# Patient Record
Sex: FEMALE | Race: WHITE | Employment: OTHER | URBAN - NONMETROPOLITAN AREA
[De-identification: names, ages, dates, MRNs, and addresses within clinical notes are randomized per-mention and may not be internally consistent; named-entity substitution may affect disease eponyms.]

---

## 2017-01-09 ENCOUNTER — TELEPHONE (OUTPATIENT)
Dept: SURGERY | Age: 72
End: 2017-01-09

## 2017-01-10 ENCOUNTER — OFFICE VISIT (OUTPATIENT)
Dept: SURGERY | Age: 72
End: 2017-01-10
Payer: MEDICARE

## 2017-01-10 ENCOUNTER — HOSPITAL ENCOUNTER (OUTPATIENT)
Dept: WOMENS IMAGING | Age: 72
Discharge: HOME | End: 2017-01-10
Payer: MEDICARE

## 2017-01-10 VITALS
HEART RATE: 70 BPM | DIASTOLIC BLOOD PRESSURE: 70 MMHG | RESPIRATION RATE: 18 BRPM | HEIGHT: 65 IN | WEIGHT: 190.4 LBS | TEMPERATURE: 98.2 F | BODY MASS INDEX: 31.72 KG/M2 | SYSTOLIC BLOOD PRESSURE: 118 MMHG

## 2017-01-10 DIAGNOSIS — Z12.31 ENCOUNTER FOR SCREENING MAMMOGRAM FOR HIGH-RISK PATIENT: ICD-10-CM

## 2017-01-10 DIAGNOSIS — Z12.31 VISIT FOR SCREENING MAMMOGRAM: Primary | ICD-10-CM

## 2017-01-10 PROCEDURE — 99212 OFFICE O/P EST SF 10 MIN: CPT | Performed by: PHYSICIAN ASSISTANT

## 2017-01-10 PROCEDURE — G0202 SCR MAMMO BI INCL CAD: HCPCS

## 2017-01-10 RX ORDER — TAMOXIFEN CITRATE 10 MG/1
TABLET ORAL
COMMUNITY
Start: 2016-12-28 | End: 2019-06-12 | Stop reason: CLARIF

## 2017-06-29 ENCOUNTER — TELEPHONE (OUTPATIENT)
Dept: SURGERY | Age: 72
End: 2017-06-29

## 2017-09-25 ENCOUNTER — TELEPHONE (OUTPATIENT)
Dept: INTERNAL MEDICINE | Age: 72
End: 2017-09-25

## 2017-10-03 DIAGNOSIS — Z00.00 ROUTINE GENERAL MEDICAL EXAMINATION AT A HEALTH CARE FACILITY: ICD-10-CM

## 2017-10-03 DIAGNOSIS — R73.01 IMPAIRED FASTING GLUCOSE: ICD-10-CM

## 2017-10-03 DIAGNOSIS — E03.9 UNSPECIFIED HYPOTHYROIDISM: ICD-10-CM

## 2017-10-03 DIAGNOSIS — E78.00 PURE HYPERCHOLESTEROLEMIA: Primary | ICD-10-CM

## 2017-10-03 DIAGNOSIS — E55.9 UNSPECIFIED VITAMIN D DEFICIENCY: ICD-10-CM

## 2017-11-16 PROBLEM — I34.0 NON-RHEUMATIC MITRAL REGURGITATION: Status: ACTIVE | Noted: 2017-11-16

## 2017-11-16 PROBLEM — R73.01 IFG (IMPAIRED FASTING GLUCOSE): Status: ACTIVE | Noted: 2017-11-16

## 2017-11-16 PROBLEM — E03.9 ACQUIRED HYPOTHYROIDISM: Status: ACTIVE | Noted: 2017-11-16

## 2017-11-16 PROBLEM — E53.8 B12 DEFICIENCY: Status: ACTIVE | Noted: 2017-11-16

## 2017-11-16 PROBLEM — E66.09 EXOGENOUS OBESITY: Status: ACTIVE | Noted: 2017-11-16

## 2017-11-16 PROBLEM — E55.9 VITAMIN D DEFICIENCY: Status: ACTIVE | Noted: 2017-11-16

## 2017-11-16 PROBLEM — E78.00 PURE HYPERCHOLESTEROLEMIA: Status: ACTIVE | Noted: 2017-11-16

## 2017-12-08 ENCOUNTER — OFFICE VISIT (OUTPATIENT)
Dept: INTERNAL MEDICINE | Age: 72
End: 2017-12-08
Payer: MEDICARE

## 2017-12-08 VITALS
SYSTOLIC BLOOD PRESSURE: 138 MMHG | WEIGHT: 200 LBS | DIASTOLIC BLOOD PRESSURE: 83 MMHG | RESPIRATION RATE: 18 BRPM | HEIGHT: 65 IN | BODY MASS INDEX: 33.32 KG/M2

## 2017-12-08 DIAGNOSIS — E03.9 ACQUIRED HYPOTHYROIDISM: ICD-10-CM

## 2017-12-08 DIAGNOSIS — E78.00 PURE HYPERCHOLESTEROLEMIA: ICD-10-CM

## 2017-12-08 DIAGNOSIS — Z23 IMMUNIZATION DUE: ICD-10-CM

## 2017-12-08 DIAGNOSIS — Z00.00 ROUTINE GENERAL MEDICAL EXAMINATION AT A HEALTH CARE FACILITY: ICD-10-CM

## 2017-12-08 DIAGNOSIS — I48.0 PAF (PAROXYSMAL ATRIAL FIBRILLATION) (HCC): ICD-10-CM

## 2017-12-08 DIAGNOSIS — E55.9 VITAMIN D DEFICIENCY: ICD-10-CM

## 2017-12-08 DIAGNOSIS — Z00.00 MEDICARE ANNUAL WELLNESS VISIT, SUBSEQUENT: Primary | ICD-10-CM

## 2017-12-08 DIAGNOSIS — I34.0 NON-RHEUMATIC MITRAL REGURGITATION: ICD-10-CM

## 2017-12-08 DIAGNOSIS — R73.01 IMPAIRED FASTING GLUCOSE: ICD-10-CM

## 2017-12-08 DIAGNOSIS — R73.01 IFG (IMPAIRED FASTING GLUCOSE): ICD-10-CM

## 2017-12-08 DIAGNOSIS — E03.9 HYPOTHYROIDISM: ICD-10-CM

## 2017-12-08 LAB
ALBUMIN SERPL-MCNC: 4.3 G/DL (ref 3.5–5.2)
ALP BLD-CCNC: 53 U/L (ref 35–104)
ALT SERPL-CCNC: 16 U/L (ref 5–33)
ANION GAP SERPL CALCULATED.3IONS-SCNC: 13 MMOL/L (ref 7–19)
AST SERPL-CCNC: 21 U/L (ref 5–32)
BILIRUB SERPL-MCNC: 0.7 MG/DL (ref 0.2–1.2)
BILIRUBIN URINE: NEGATIVE
BLOOD, URINE: NEGATIVE
BUN BLDV-MCNC: 8 MG/DL (ref 8–23)
CALCIUM SERPL-MCNC: 9.1 MG/DL (ref 8.8–10.2)
CHLORIDE BLD-SCNC: 107 MMOL/L (ref 98–111)
CHOLESTEROL, TOTAL: 203 MG/DL (ref 160–199)
CLARITY: ABNORMAL
CO2: 25 MMOL/L (ref 22–29)
COLOR: YELLOW
CREAT SERPL-MCNC: <0.5 MG/DL (ref 0.5–0.9)
GFR NON-AFRICAN AMERICAN: >60
GLUCOSE BLD-MCNC: 94 MG/DL (ref 74–109)
GLUCOSE URINE: NEGATIVE MG/DL
HBA1C MFR BLD: 5.3 %
HCT VFR BLD CALC: 43.6 % (ref 37–47)
HDLC SERPL-MCNC: 71 MG/DL (ref 65–121)
HEMOGLOBIN: 14.2 G/DL (ref 12–16)
KETONES, URINE: NEGATIVE MG/DL
LDL CHOLESTEROL CALCULATED: 106 MG/DL
LEUKOCYTE ESTERASE, URINE: NEGATIVE
MCH RBC QN AUTO: 32.3 PG (ref 27–31)
MCHC RBC AUTO-ENTMCNC: 32.6 G/DL (ref 33–37)
MCV RBC AUTO: 99.3 FL (ref 81–99)
NITRITE, URINE: NEGATIVE
PDW BLD-RTO: 12.4 % (ref 11.5–14.5)
PH UA: 7.5
PLATELET # BLD: 257 K/UL (ref 130–400)
PMV BLD AUTO: 11.6 FL (ref 9.4–12.3)
POTASSIUM SERPL-SCNC: 4.3 MMOL/L (ref 3.5–5)
PROTEIN UA: NEGATIVE MG/DL
RBC # BLD: 4.39 M/UL (ref 4.2–5.4)
SODIUM BLD-SCNC: 145 MMOL/L (ref 136–145)
SPECIFIC GRAVITY UA: 1.01
T4 FREE: 1.5 NG/DL (ref 0.9–1.7)
TOTAL PROTEIN: 6.8 G/DL (ref 6.6–8.7)
TRIGL SERPL-MCNC: 130 MG/DL (ref 0–149)
TSH SERPL DL<=0.05 MIU/L-ACNC: 2.14 UIU/ML (ref 0.27–4.2)
UROBILINOGEN, URINE: 0.2 E.U./DL
VITAMIN D 25-HYDROXY: 36.5 NG/ML
WBC # BLD: 5.7 K/UL (ref 4.8–10.8)

## 2017-12-08 PROCEDURE — 3014F SCREEN MAMMO DOC REV: CPT | Performed by: INTERNAL MEDICINE

## 2017-12-08 PROCEDURE — G8400 PT W/DXA NO RESULTS DOC: HCPCS | Performed by: INTERNAL MEDICINE

## 2017-12-08 PROCEDURE — 1090F PRES/ABSN URINE INCON ASSESS: CPT | Performed by: INTERNAL MEDICINE

## 2017-12-08 PROCEDURE — G0439 PPPS, SUBSEQ VISIT: HCPCS | Performed by: INTERNAL MEDICINE

## 2017-12-08 PROCEDURE — 1036F TOBACCO NON-USER: CPT | Performed by: INTERNAL MEDICINE

## 2017-12-08 PROCEDURE — 99213 OFFICE O/P EST LOW 20 MIN: CPT | Performed by: INTERNAL MEDICINE

## 2017-12-08 PROCEDURE — 90670 PCV13 VACCINE IM: CPT | Performed by: INTERNAL MEDICINE

## 2017-12-08 PROCEDURE — 1123F ACP DISCUSS/DSCN MKR DOCD: CPT | Performed by: INTERNAL MEDICINE

## 2017-12-08 PROCEDURE — G8427 DOCREV CUR MEDS BY ELIG CLIN: HCPCS | Performed by: INTERNAL MEDICINE

## 2017-12-08 PROCEDURE — G0009 ADMIN PNEUMOCOCCAL VACCINE: HCPCS | Performed by: INTERNAL MEDICINE

## 2017-12-08 PROCEDURE — 4040F PNEUMOC VAC/ADMIN/RCVD: CPT | Performed by: INTERNAL MEDICINE

## 2017-12-08 PROCEDURE — G8417 CALC BMI ABV UP PARAM F/U: HCPCS | Performed by: INTERNAL MEDICINE

## 2017-12-08 PROCEDURE — 3017F COLORECTAL CA SCREEN DOC REV: CPT | Performed by: INTERNAL MEDICINE

## 2017-12-08 PROCEDURE — G8484 FLU IMMUNIZE NO ADMIN: HCPCS | Performed by: INTERNAL MEDICINE

## 2017-12-08 ASSESSMENT — ENCOUNTER SYMPTOMS
BLOOD IN STOOL: 0
EYE REDNESS: 0
COUGH: 0
VOICE CHANGE: 0
COLOR CHANGE: 0
ABDOMINAL PAIN: 0
NAUSEA: 0
SORE THROAT: 0
CHEST TIGHTNESS: 0
WHEEZING: 0
CONSTIPATION: 0
TROUBLE SWALLOWING: 0
SINUS PRESSURE: 0
DIARRHEA: 0
VOMITING: 0
EYE PAIN: 0

## 2017-12-08 ASSESSMENT — LIFESTYLE VARIABLES: HOW OFTEN DO YOU HAVE A DRINK CONTAINING ALCOHOL: 0

## 2017-12-08 ASSESSMENT — ANXIETY QUESTIONNAIRES: GAD7 TOTAL SCORE: 0

## 2017-12-08 ASSESSMENT — PATIENT HEALTH QUESTIONNAIRE - PHQ9: SUM OF ALL RESPONSES TO PHQ QUESTIONS 1-9: 0

## 2017-12-08 NOTE — PROGRESS NOTES
Medical History:   Diagnosis Date    Breast cancer ([facility] [address] 1/2014    right    GERD (gastroesophageal reflux disease)     Hyperlipidemia     Thyroid disease     Wears glasses        Past Surgical History:   Procedure Laterality Date    BREAST SURGERY Bilateral 1985    FIBROIDS REMOVED FROM EACH BREAST    BREAST SURGERY Right 1/7/14    R partial mastectomy    BREAST SURGERY Right 1/22/14    Rt JUAN R cath insertion    MELVINA AND BSO         Current Outpatient Prescriptions   Medication Sig Dispense Refill    tamoxifen (NOLVADEX) 10 MG tablet       ibuprofen (ADVIL;MOTRIN) 800 MG tablet Take 1 tablet by mouth every 8 hours as needed for Pain 90 tablet 0    levothyroxine (SYNTHROID) 75 MCG tablet       simvastatin (ZOCOR) 20 MG tablet       latanoprost (XALATAN) 0.005 % ophthalmic solution       Vitamin D (CHOLECALCIFEROL) 1000 UNITS CAPS capsule Take 1,000 Units by mouth daily.  esomeprazole Magnesium (NEXIUM) 40 MG PACK Take 40 mg by mouth daily.  aspirin 81 MG tablet Take 81 mg by mouth daily. No current facility-administered medications for this visit.       No Known Allergies    Social History     Social History    Marital status:      Spouse name: N/A    Number of children: N/A    Years of education: N/A     Social History Main Topics    Smoking status: Never Smoker    Smokeless tobacco: Never Used    Alcohol use No    Drug use: No    Sexual activity: No     Other Topics Concern    None     Social History Narrative    None     Family History   Problem Relation Age of Onset    Cancer Mother      throat    Cancer Maternal Grandmother      ovarian    Parkinsonism Father     Breast Cancer Neg Hx        Past Surgical History:   Procedure Laterality Date    BREAST SURGERY Bilateral 1985    FIBROIDS REMOVED FROM EACH BREAST    BREAST SURGERY Right 1/7/14    R partial mastectomy    BREAST SURGERY Right 1/22/14    Rt JUAN R cath insertion    MELVINA AND BSO           Lab Review Negative for abdominal pain, blood in stool, constipation, diarrhea, nausea and vomiting. Endocrine: Negative for polydipsia and polyuria. Genitourinary: Negative for dysuria, enuresis, flank pain, frequency and urgency. Musculoskeletal: Negative for arthralgias, gait problem and joint swelling. Skin: Negative for color change and rash. Neurological: Negative for dizziness, tremors, syncope, facial asymmetry, speech difficulty, weakness, numbness and headaches. Psychiatric/Behavioral: Negative for agitation, behavioral problems, confusion, sleep disturbance and suicidal ideas. The patient is not nervous/anxious. Vitals:    12/08/17 1334   BP: 138/83   Site: Right Arm   Position: Sitting   Cuff Size: Large Adult   Resp: 18   Weight: 200 lb (90.7 kg)   Height: 5' 5\" (1.651 m)      Wt Readings from Last 3 Encounters:   12/08/17 200 lb (90.7 kg)   01/10/17 190 lb 6.4 oz (86.4 kg)   09/29/16 188 lb 3.2 oz (85.4 kg)   Body mass index is 33.28 kg/m². BP Readings from Last 3 Encounters:   12/08/17 138/83   01/10/17 118/70   09/29/16 140/80       Physical Exam   Constitutional: She is oriented to person, place, and time. She appears well-developed and well-nourished. HENT:   Head: Normocephalic and atraumatic. Right Ear: External ear normal.   Left Ear: External ear normal.   Mouth/Throat: Oropharynx is clear and moist.   Eyes: Conjunctivae are normal. Pupils are equal, round, and reactive to light. No scleral icterus. Neck: Normal range of motion. Neck supple. No JVD present. No thyromegaly present. Cardiovascular: Normal rate, regular rhythm and normal heart sounds. No murmur heard. Pulmonary/Chest: Effort normal and breath sounds normal. No respiratory distress. She has no wheezes. She exhibits no tenderness. Abdominal: Soft. Bowel sounds are normal. She exhibits no mass. There is no tenderness. Musculoskeletal: Normal range of motion. She exhibits no edema or tenderness. Lymphadenopathy:     She has no cervical adenopathy. Neurological: She is alert and oriented to person, place, and time. She has normal reflexes. No cranial nerve deficit. Coordination normal.   Skin: Skin is warm and dry. No rash noted. No erythema. Psychiatric: She has a normal mood and affect. Her behavior is normal.         ASSESSMENT/PLAN    Pure hypercholesterolemia- LDL is 106, this is better compared to 2016. She'll continue lower fat diet and exercise program    Acquired hypothyroidism-her thyroid levels are in good range, patient will continue Synthroid 75 µg daily    IFG (impaired fasting glucose)= elevated A1c is normal at 5.3 strict lower carbohydrate diet and weight loss is recommended  Pt was given approximately 5 minutes of counseling about diet and exercise including education on what calories are, where calories come from, the need for portion control,following lower carbohydrate dietary regimen and healthy snacks along side an active lifestyle with supplementary exercise of approx 30 minutes a week, 5 days a week of exercise for weight loss. The patient voiced increased understanding of the topics discussed. PAF (paroxysmal atrial fibrillation) ([address]- no further episdoes    Non-rheumatic mitral regurgitation- detected / had repeat echo [facility]   NORMAL LV AND RV SIZE AND FUNCTION  NO SIGNIFICANT VALVULAR DYSFUNCTION  BORDERLINE PULMONARY HTN  BORDERLINE LAE        MEDICARE WELLNESS SCREENING/ MAINTENANCE:  1. MAMMOGRAM:As per Dr. Chandler Guerrier  2. SCREENING CSCOPE , repeat 10 years  3. BONE DENSITY SCREENIN normal, repeat 5 years  4. PAP SCREENING:na  5. DIABETES SCREEN - FBS DONE/ ABOVE LABS  6. CARDIOVASCULAR SCREENING- LIPID PANEL  DONE/ ABOVE LABS  7. PNEUMONIA VACCINATION- prevanr today  8. INFLUENZA VACCINATION: TO BE DONE IN FALL- refuses  9. HEP B VACCINATION- PT DECLINES  10. HIV/ HEP SCREENING- PT DECLINES  11.  OPTOMETRY/OPTHALMOLOGY APPOINTMENT SUGGESTED FOR GLAUCOMA SCREENING has appt to see dr Mari Mittal  12. AAA SCREEN - N/A    HEALTH PLAN:  1. HEALTHY DIET:lower insaturated fats and free sugars  2. EXERCISE suggest at least 30-50 min dialy  3. NO INCREASED FALL RISK ON EXAM    Orders Placed This Encounter   Procedures    Hemoglobin A1C    Lipid Panel    Comprehensive Metabolic Panel    TSH without Reflex    T4, Free     New Prescriptions    No medications on file        Return in about 6 months (around 6/8/2018) for Medication check. Patient Instructions     Patient Education        Learning About Low-Carbohydrate Diets for Weight Loss  What is a low-carbohydrate diet? Low-carb diets avoid foods that are high in carbohydrate. These high-carb foods include pasta, bread, rice, cereal, fruits, and starchy vegetables. Instead, these diets usually have you eat foods that are high in fat and protein. Many people lose weight quickly on a low-carb diet. But the early weight loss is water. People on this diet often gain the weight back after they start eating carbs again. Not all diet plans are safe or work well. A lot of the evidence shows that low-carb diets aren't healthy. That's because these diets often don't include healthy foods like fruits and vegetables. Losing weight safely means balancing protein, fat, and carbs with every meal and snack. And low-carb diets don't always provide the vitamins, minerals, and fiber you need. If you have a serious medical condition, talk to your doctor before you try any diet. These conditions include kidney disease, heart disease, type 2 diabetes, high cholesterol, and high blood pressure. If you are pregnant, it may not be safe for your baby if you are on a low-carb diet. How can you lose weight safely? You might have heard that a diet plan helped another person lose weight. But that doesn't mean that it will work for you. It is very hard to stay on a diet that includes lots of big changes in your eating habits.  If you want to get to a healthy weight and stay there, making healthy lifestyle changes will often work better than dieting. These steps can help. · Make a plan for change. Work with your doctor to create a plan that is right for you. · See a dietitian. He or she can show you how to make healthy changes in your eating habits. · Manage stress. If you have a lot of stress in your life, it can be hard to focus on making healthy changes to your daily habits. · Track your food and activity. You are likely to do better at losing weight if you keep track of what you eat and what you do. Follow-up care is a key part of your treatment and safety. Be sure to make and go to all appointments, and call your doctor if you are having problems. It's also a good idea to know your test results and keep a list of the medicines you take. Where can you learn more? Go to https://FlexcompeInvoiceable.I.Systems. org and sign in to your [employer] account. Enter A121 in the Projectioneering box to learn more about \"Learning About Low-Carbohydrate Diets for Weight Loss. \"     If you do not have an account, please click on the \"Sign Up Now\" link. Current as of: May 12, 2017  Content Version: 11.4  © 1096-8438 Healthwise, [employer]. Care instructions adapted under license by [facility] ([facility]). If you have questions about a medical condition or this instruction, always ask your healthcare professional. [place] [address] any warranty or liability for your use of this information.            Orders Placed This Encounter   Procedures    Hemoglobin A1C     Standing Status:   Future     Standing Expiration Date:   12/8/2018    Lipid Panel     Standing Status:   Future     Standing Expiration Date:   12/8/2018     Order Specific Question:   Is Patient Fasting?/# of Hours     Answer:   fasting    Comprehensive Metabolic Panel     Standing Status:   Future     Standing Expiration Date:   12/8/2018    TSH without Reflex     Standing Status: Future     Standing Expiration Date:   12/8/2018    T4, Free     Standing Status:   Future     Standing Expiration Date:   12/8/2018     EMR Dragon/transcription disclaimer:Significant part of this  encounter note is electronic transcription/translation of spoken language to printed text. The electronic translation of spoken language may be erroneous, or at times, nonsensical words or phrases may be inadvertently transcribed.  Although I have reviewed the note for such errors, some may still exist.

## 2017-12-08 NOTE — PATIENT INSTRUCTIONS
losing weight if you keep track of what you eat and what you do. Follow-up care is a key part of your treatment and safety. Be sure to make and go to all appointments, and call your doctor if you are having problems. It's also a good idea to know your test results and keep a list of the medicines you take. Where can you learn more? Go to https://chpepiceweb.TV Talk Network. org and sign in to your [employer] account. Enter A121 in the Navendis box to learn more about \"Learning About Low-Carbohydrate Diets for Weight Loss. \"     If you do not have an account, please click on the \"Sign Up Now\" link. Current as of: May 12, 2017  Content Version: 11.4  © 0318-5283 Healthwise, Incorporated. Care instructions adapted under license by [facility] ([facility]). If you have questions about a medical condition or this instruction, always ask your healthcare professional. [address] any warranty or liability for your use of this information.

## 2018-01-04 ENCOUNTER — HOSPITAL ENCOUNTER (OUTPATIENT)
Dept: WOMENS IMAGING | Age: 73
Discharge: HOME | End: 2018-01-04
Payer: MEDICARE

## 2018-01-04 DIAGNOSIS — Z78.0 ASYMPTOMATIC MENOPAUSAL STATE: ICD-10-CM

## 2018-01-04 PROCEDURE — 77080 DXA BONE DENSITY AXIAL: CPT

## 2018-01-11 ENCOUNTER — HOSPITAL ENCOUNTER (OUTPATIENT)
Dept: WOMENS IMAGING | Age: 73
Discharge: HOME | End: 2018-01-11
Payer: MEDICARE

## 2018-01-11 DIAGNOSIS — Z12.31 VISIT FOR SCREENING MAMMOGRAM: ICD-10-CM

## 2018-01-11 PROCEDURE — 77063 BREAST TOMOSYNTHESIS BI: CPT

## 2018-04-12 PROBLEM — Z00.00 MEDICARE ANNUAL WELLNESS VISIT, SUBSEQUENT: Status: RESOLVED | Noted: 2017-12-08 | Resolved: 2018-04-12

## 2018-05-07 RX ORDER — LEVOTHYROXINE SODIUM 0.07 MG/1
TABLET ORAL
Qty: 90 TABLET | Refills: 3 | Status: SHIPPED | OUTPATIENT
Start: 2018-05-07 | End: 2018-06-19 | Stop reason: SDUPTHER

## 2018-06-19 ENCOUNTER — OFFICE VISIT (OUTPATIENT)
Dept: INTERNAL MEDICINE | Age: 73
End: 2018-06-19
Payer: MEDICARE

## 2018-06-19 VITALS
DIASTOLIC BLOOD PRESSURE: 88 MMHG | OXYGEN SATURATION: 95 % | SYSTOLIC BLOOD PRESSURE: 132 MMHG | WEIGHT: 193.2 LBS | BODY MASS INDEX: 32.19 KG/M2 | HEIGHT: 65 IN | HEART RATE: 76 BPM

## 2018-06-19 DIAGNOSIS — E55.9 VITAMIN D DEFICIENCY: ICD-10-CM

## 2018-06-19 DIAGNOSIS — R73.01 IFG (IMPAIRED FASTING GLUCOSE): ICD-10-CM

## 2018-06-19 DIAGNOSIS — E03.9 ACQUIRED HYPOTHYROIDISM: ICD-10-CM

## 2018-06-19 DIAGNOSIS — E78.00 PURE HYPERCHOLESTEROLEMIA: ICD-10-CM

## 2018-06-19 DIAGNOSIS — E78.00 PURE HYPERCHOLESTEROLEMIA: Primary | ICD-10-CM

## 2018-06-19 DIAGNOSIS — E53.8 B12 DEFICIENCY: ICD-10-CM

## 2018-06-19 DIAGNOSIS — E66.09 EXOGENOUS OBESITY: ICD-10-CM

## 2018-06-19 LAB
ALBUMIN SERPL-MCNC: 4 G/DL (ref 3.5–5.2)
ALP BLD-CCNC: 50 U/L (ref 35–104)
ALT SERPL-CCNC: 18 U/L (ref 5–33)
ANION GAP SERPL CALCULATED.3IONS-SCNC: 13 MMOL/L (ref 7–19)
AST SERPL-CCNC: 37 U/L (ref 5–32)
BILIRUB SERPL-MCNC: 0.6 MG/DL (ref 0.2–1.2)
BUN BLDV-MCNC: 14 MG/DL (ref 8–23)
CALCIUM SERPL-MCNC: 8.9 MG/DL (ref 8.8–10.2)
CHLORIDE BLD-SCNC: 104 MMOL/L (ref 98–111)
CHOLESTEROL, TOTAL: 245 MG/DL (ref 160–199)
CO2: 25 MMOL/L (ref 22–29)
CREAT SERPL-MCNC: 0.6 MG/DL (ref 0.5–0.9)
GFR NON-AFRICAN AMERICAN: >60
GLUCOSE BLD-MCNC: 100 MG/DL (ref 74–109)
HBA1C MFR BLD: 5.1 %
HDLC SERPL-MCNC: 63 MG/DL (ref 65–121)
LDL CHOLESTEROL CALCULATED: 146 MG/DL
POTASSIUM SERPL-SCNC: 4.3 MMOL/L (ref 3.5–5)
SODIUM BLD-SCNC: 142 MMOL/L (ref 136–145)
T4 FREE: 1.4 NG/DL (ref 0.9–1.7)
TOTAL PROTEIN: 6.6 G/DL (ref 6.6–8.7)
TRIGL SERPL-MCNC: 180 MG/DL (ref 0–149)
TSH SERPL DL<=0.05 MIU/L-ACNC: 2.81 UIU/ML (ref 0.27–4.2)

## 2018-06-19 PROCEDURE — 99214 OFFICE O/P EST MOD 30 MIN: CPT | Performed by: INTERNAL MEDICINE

## 2018-06-19 PROCEDURE — 4040F PNEUMOC VAC/ADMIN/RCVD: CPT | Performed by: INTERNAL MEDICINE

## 2018-06-19 PROCEDURE — 1036F TOBACCO NON-USER: CPT | Performed by: INTERNAL MEDICINE

## 2018-06-19 PROCEDURE — G8417 CALC BMI ABV UP PARAM F/U: HCPCS | Performed by: INTERNAL MEDICINE

## 2018-06-19 PROCEDURE — 3017F COLORECTAL CA SCREEN DOC REV: CPT | Performed by: INTERNAL MEDICINE

## 2018-06-19 PROCEDURE — G8427 DOCREV CUR MEDS BY ELIG CLIN: HCPCS | Performed by: INTERNAL MEDICINE

## 2018-06-19 PROCEDURE — G8399 PT W/DXA RESULTS DOCUMENT: HCPCS | Performed by: INTERNAL MEDICINE

## 2018-06-19 PROCEDURE — 1090F PRES/ABSN URINE INCON ASSESS: CPT | Performed by: INTERNAL MEDICINE

## 2018-06-19 PROCEDURE — 1123F ACP DISCUSS/DSCN MKR DOCD: CPT | Performed by: INTERNAL MEDICINE

## 2018-06-19 RX ORDER — LEVOTHYROXINE SODIUM 0.07 MG/1
TABLET ORAL
Qty: 90 TABLET | Refills: 1 | Status: SHIPPED | OUTPATIENT
Start: 2018-06-19 | End: 2018-12-12 | Stop reason: CLARIF

## 2018-06-19 RX ORDER — TAMOXIFEN CITRATE 10 MG/1
TABLET ORAL
Status: CANCELLED | OUTPATIENT
Start: 2018-06-19

## 2018-06-19 ASSESSMENT — ENCOUNTER SYMPTOMS
COUGH: 0
CHEST TIGHTNESS: 0
CONSTIPATION: 0
SORE THROAT: 0
ABDOMINAL PAIN: 0
WHEEZING: 0

## 2018-12-12 ENCOUNTER — OFFICE VISIT (OUTPATIENT)
Dept: INTERNAL MEDICINE | Age: 73
End: 2018-12-12
Payer: MEDICARE

## 2018-12-12 VITALS
SYSTOLIC BLOOD PRESSURE: 120 MMHG | HEIGHT: 65 IN | WEIGHT: 195 LBS | HEART RATE: 79 BPM | OXYGEN SATURATION: 96 % | RESPIRATION RATE: 18 BRPM | BODY MASS INDEX: 32.49 KG/M2 | DIASTOLIC BLOOD PRESSURE: 78 MMHG

## 2018-12-12 DIAGNOSIS — E55.9 VITAMIN D DEFICIENCY: ICD-10-CM

## 2018-12-12 DIAGNOSIS — E53.8 B12 DEFICIENCY: ICD-10-CM

## 2018-12-12 DIAGNOSIS — R73.01 IFG (IMPAIRED FASTING GLUCOSE): ICD-10-CM

## 2018-12-12 DIAGNOSIS — E03.9 ACQUIRED HYPOTHYROIDISM: ICD-10-CM

## 2018-12-12 DIAGNOSIS — E66.09 EXOGENOUS OBESITY: ICD-10-CM

## 2018-12-12 DIAGNOSIS — Z00.00 MEDICARE ANNUAL WELLNESS VISIT, SUBSEQUENT: Primary | ICD-10-CM

## 2018-12-12 DIAGNOSIS — E78.00 PURE HYPERCHOLESTEROLEMIA: ICD-10-CM

## 2018-12-12 DIAGNOSIS — Z23 NEED FOR VACCINATION: ICD-10-CM

## 2018-12-12 LAB
ALBUMIN SERPL-MCNC: 3.9 G/DL (ref 3.5–5.2)
ALP BLD-CCNC: 53 U/L (ref 35–104)
ALT SERPL-CCNC: 16 U/L (ref 5–33)
ANION GAP SERPL CALCULATED.3IONS-SCNC: 13 MMOL/L (ref 7–19)
AST SERPL-CCNC: 20 U/L (ref 5–32)
BASOPHILS ABSOLUTE: 0.1 K/UL (ref 0–0.2)
BASOPHILS RELATIVE PERCENT: 0.8 % (ref 0–1)
BILIRUB SERPL-MCNC: 0.6 MG/DL (ref 0.2–1.2)
BILIRUBIN URINE: NEGATIVE
BLOOD, URINE: NEGATIVE
BUN BLDV-MCNC: 9 MG/DL (ref 8–23)
CALCIUM SERPL-MCNC: 9.3 MG/DL (ref 8.8–10.2)
CHLORIDE BLD-SCNC: 104 MMOL/L (ref 98–111)
CHOLESTEROL, TOTAL: 236 MG/DL (ref 160–199)
CLARITY: CLEAR
CO2: 25 MMOL/L (ref 22–29)
COLOR: YELLOW
CREAT SERPL-MCNC: 0.6 MG/DL (ref 0.5–0.9)
EOSINOPHILS ABSOLUTE: 0.2 K/UL (ref 0–0.6)
EOSINOPHILS RELATIVE PERCENT: 2.8 % (ref 0–5)
GFR NON-AFRICAN AMERICAN: >60
GLUCOSE BLD-MCNC: 88 MG/DL (ref 74–109)
GLUCOSE URINE: NEGATIVE MG/DL
HBA1C MFR BLD: 5.3 % (ref 4–6)
HCT VFR BLD CALC: 42.3 % (ref 37–47)
HDLC SERPL-MCNC: 67 MG/DL (ref 65–121)
HEMOGLOBIN: 13.5 G/DL (ref 12–16)
KETONES, URINE: NEGATIVE MG/DL
LDL CHOLESTEROL CALCULATED: 137 MG/DL
LEUKOCYTE ESTERASE, URINE: NEGATIVE
LYMPHOCYTES ABSOLUTE: 2 K/UL (ref 1.1–4.5)
LYMPHOCYTES RELATIVE PERCENT: 31.6 % (ref 20–40)
MCH RBC QN AUTO: 31.6 PG (ref 27–31)
MCHC RBC AUTO-ENTMCNC: 31.9 G/DL (ref 33–37)
MCV RBC AUTO: 99.1 FL (ref 81–99)
MONOCYTES ABSOLUTE: 0.5 K/UL (ref 0–0.9)
MONOCYTES RELATIVE PERCENT: 8.5 % (ref 0–10)
NEUTROPHILS ABSOLUTE: 3.5 K/UL (ref 1.5–7.5)
NEUTROPHILS RELATIVE PERCENT: 56.1 % (ref 50–65)
NITRITE, URINE: NEGATIVE
PDW BLD-RTO: 12.5 % (ref 11.5–14.5)
PH UA: 7.5
PLATELET # BLD: 260 K/UL (ref 130–400)
PMV BLD AUTO: 11.7 FL (ref 9.4–12.3)
POTASSIUM SERPL-SCNC: 4.2 MMOL/L (ref 3.5–5)
PROTEIN UA: NEGATIVE MG/DL
RBC # BLD: 4.27 M/UL (ref 4.2–5.4)
SODIUM BLD-SCNC: 142 MMOL/L (ref 136–145)
SPECIFIC GRAVITY UA: 1.01
T4 FREE: 1.4 NG/DL (ref 0.9–1.7)
TOTAL PROTEIN: 6.7 G/DL (ref 6.6–8.7)
TRIGL SERPL-MCNC: 161 MG/DL (ref 0–149)
TSH SERPL DL<=0.05 MIU/L-ACNC: 5.06 UIU/ML (ref 0.27–4.2)
UROBILINOGEN, URINE: 0.2 E.U./DL
VITAMIN B-12: 366 PG/ML (ref 211–946)
VITAMIN D 25-HYDROXY: 32.4 NG/ML
WBC # BLD: 6.3 K/UL (ref 4.8–10.8)

## 2018-12-12 PROCEDURE — G8399 PT W/DXA RESULTS DOCUMENT: HCPCS | Performed by: INTERNAL MEDICINE

## 2018-12-12 PROCEDURE — G8427 DOCREV CUR MEDS BY ELIG CLIN: HCPCS | Performed by: INTERNAL MEDICINE

## 2018-12-12 PROCEDURE — 3017F COLORECTAL CA SCREEN DOC REV: CPT | Performed by: INTERNAL MEDICINE

## 2018-12-12 PROCEDURE — G8417 CALC BMI ABV UP PARAM F/U: HCPCS | Performed by: INTERNAL MEDICINE

## 2018-12-12 PROCEDURE — 4040F PNEUMOC VAC/ADMIN/RCVD: CPT | Performed by: INTERNAL MEDICINE

## 2018-12-12 PROCEDURE — G8484 FLU IMMUNIZE NO ADMIN: HCPCS | Performed by: INTERNAL MEDICINE

## 2018-12-12 PROCEDURE — 1101F PT FALLS ASSESS-DOCD LE1/YR: CPT | Performed by: INTERNAL MEDICINE

## 2018-12-12 PROCEDURE — 1036F TOBACCO NON-USER: CPT | Performed by: INTERNAL MEDICINE

## 2018-12-12 PROCEDURE — 99214 OFFICE O/P EST MOD 30 MIN: CPT | Performed by: INTERNAL MEDICINE

## 2018-12-12 PROCEDURE — 1123F ACP DISCUSS/DSCN MKR DOCD: CPT | Performed by: INTERNAL MEDICINE

## 2018-12-12 PROCEDURE — G0009 ADMIN PNEUMOCOCCAL VACCINE: HCPCS | Performed by: INTERNAL MEDICINE

## 2018-12-12 PROCEDURE — 90732 PPSV23 VACC 2 YRS+ SUBQ/IM: CPT | Performed by: INTERNAL MEDICINE

## 2018-12-12 PROCEDURE — 1090F PRES/ABSN URINE INCON ASSESS: CPT | Performed by: INTERNAL MEDICINE

## 2018-12-12 PROCEDURE — G0439 PPPS, SUBSEQ VISIT: HCPCS | Performed by: INTERNAL MEDICINE

## 2018-12-12 RX ORDER — LEVOTHYROXINE SODIUM 0.07 MG/1
TABLET ORAL
Qty: 90 TABLET | Refills: 1 | Status: SHIPPED | OUTPATIENT
Start: 2018-12-12 | End: 2020-01-02 | Stop reason: SDUPTHER

## 2018-12-12 ASSESSMENT — ENCOUNTER SYMPTOMS
VOMITING: 0
COLOR CHANGE: 0
CHEST TIGHTNESS: 0
VOICE CHANGE: 0
TROUBLE SWALLOWING: 0
WHEEZING: 0
COUGH: 0
EYE REDNESS: 0
EYE PAIN: 0
SINUS PRESSURE: 0
ABDOMINAL PAIN: 0
NAUSEA: 0
CONSTIPATION: 0
BLOOD IN STOOL: 0
DIARRHEA: 0
SORE THROAT: 0

## 2018-12-12 ASSESSMENT — LIFESTYLE VARIABLES: HOW OFTEN DO YOU HAVE A DRINK CONTAINING ALCOHOL: 0

## 2018-12-12 ASSESSMENT — PATIENT HEALTH QUESTIONNAIRE - PHQ9
SUM OF ALL RESPONSES TO PHQ QUESTIONS 1-9: 0
SUM OF ALL RESPONSES TO PHQ QUESTIONS 1-9: 0

## 2018-12-12 ASSESSMENT — ANXIETY QUESTIONNAIRES: GAD7 TOTAL SCORE: 0

## 2018-12-12 NOTE — PROGRESS NOTES
12/12/2018 236*    Triglycerides 12/12/2018 161*    HDL 12/12/2018 67     LDL Calculated 12/12/2018 137     Hemoglobin A1C 12/12/2018 5.3     Sodium 12/12/2018 142     Potassium 12/12/2018 4.2     Chloride 12/12/2018 104     CO2 12/12/2018 25     Anion Gap 12/12/2018 13     Glucose 12/12/2018 88     BUN 12/12/2018 9     CREATININE 12/12/2018 0.6     GFR Non- 12/12/2018 >60     Calcium 12/12/2018 9.3     Total Protein 12/12/2018 6.7     Alb 12/12/2018 3.9     Total Bilirubin 12/12/2018 0.6     Alkaline Phosphatase 12/12/2018 53     ALT 12/12/2018 16     AST 12/12/2018 20     WBC 12/12/2018 6.3     RBC 12/12/2018 4.27     Hemoglobin 12/12/2018 13.5     Hematocrit 12/12/2018 42.3     MCV 12/12/2018 99.1*    MCH 12/12/2018 31.6*    MCHC 12/12/2018 31.9*    RDW 12/12/2018 12.5     Platelets 17/47/9847 260     MPV 12/12/2018 11.7     Neutrophils % 12/12/2018 56.1     Lymphocytes % 12/12/2018 31.6     Monocytes % 12/12/2018 8.5     Eosinophils % 12/12/2018 2.8     Basophils % 12/12/2018 0.8     Neutrophils # 12/12/2018 3.5     Lymphocytes # 12/12/2018 2.0     Monocytes # 12/12/2018 0.50     Eosinophils # 12/12/2018 0.20     Basophils # 12/12/2018 0.10     Color, UA 12/12/2018 YELLOW     Clarity, UA 12/12/2018 Clear     Glucose, Ur 12/12/2018 Negative     Bilirubin Urine 12/12/2018 Negative     Ketones, Urine 12/12/2018 Negative     Specific Gravity, UA 12/12/2018 1.011     Blood, Urine 12/12/2018 Negative     pH, UA 12/12/2018 7.5     Protein, UA 12/12/2018 Negative     Urobilinogen, Urine 12/12/2018 0.2     Nitrite, Urine 12/12/2018 Negative     Leukocyte Esterase, Urine 12/12/2018 Negative     TSH 12/12/2018 5.060*    T4 Free 12/12/2018 1.4     Vit D, 25-Hydroxy 12/12/2018 32.4     Vitamin B-12 12/12/2018 366          Review of Systems   Constitutional: Positive for fatigue. Negative for chills and fever.    HENT: Negative for congestion, ear pain, swimming, cycling, or playing tennis or team sports. · Stay at a healthy weight. Lose weight if you need to. · Don't smoke. If you need help quitting, talk to your doctor about stop-smoking programs and medicines. These can increase your chances of quitting for good. How is high cholesterol treated? The goal of treatment is to reduce your chances of having a heart attack or stroke. The goal is not to lower your cholesterol numbers only. · You may make lifestyle changes, such as eating healthy foods, not smoking, losing weight, and being more active. · You may have to take medicine. Follow-up care is a key part of your treatment and safety. Be sure to make and go to all appointments, and call your doctor if you are having problems. It's also a good idea to know your test results and keep a list of the medicines you take. Where can you learn more? Go to https://Haowj.compepiceweb.[employer]. org and sign in to your [employer] account. Enter E767 in the Dude Solutions box to learn more about \"Learning About High Cholesterol. \"     If you do not have an account, please click on the \"Sign Up Now\" link. Current as of: December 6, 2017  Content Version: 11.8  © 3430-3995 Healthwise, [email or web address]. Care instructions adapted under license by [facility] [place] ([facility]). If you have questions about a medical condition or this instruction, always ask your healthcare professional. [place] [address] any warranty or liability for your use of this information.              Orders Placed This Encounter   Procedures    PNEUMOVAX 23 subcutaneous/IM (Pneumococcal polysaccharide vaccine 23-valent >= 1yo)    Lipid Panel     Standing Status:   Future     Standing Expiration Date:   12/12/2019     Order Specific Question:   Is Patient Fasting?/# of Hours     Answer:   fasting    Hemoglobin A1C     Standing Status:   Future     Standing Expiration Date:   12/12/2019    Comprehensive Metabolic Panel     Standing Status: Future     Standing Expiration Date:   12/12/2019    Vitamin D 25 Hydroxy     Standing Status:   Future     Standing Expiration Date:   12/12/2019    TSH without Reflex     Standing Status:   Future     Standing Expiration Date:   12/12/2019    T4, Free     Standing Status:   Future     Standing Expiration Date:   12/12/2019    Vitamin B12     Standing Status:   Future     Standing Expiration Date:   12/12/2019       EMR Dragon/transcriptiondisclaimer:Significant part of this  encounter note is electronic transcription/translation of spoken language to printed text. The electronic translation of spoken language may be erroneous, or at times, nonsensical wordsor phrases may be inadvertently transcribed.  Although I have reviewed the note for such errors, some may still exist.

## 2019-01-11 PROBLEM — Z00.00 MEDICARE ANNUAL WELLNESS VISIT, SUBSEQUENT: Status: RESOLVED | Noted: 2017-12-08 | Resolved: 2019-01-11

## 2019-01-15 ENCOUNTER — HOSPITAL ENCOUNTER (OUTPATIENT)
Dept: WOMENS IMAGING | Age: 74
Discharge: HOME | End: 2019-01-15
Payer: MEDICARE

## 2019-01-15 DIAGNOSIS — Z12.39 BREAST CANCER SCREENING: ICD-10-CM

## 2019-01-15 PROCEDURE — 77063 BREAST TOMOSYNTHESIS BI: CPT

## 2019-06-12 ENCOUNTER — OFFICE VISIT (OUTPATIENT)
Dept: INTERNAL MEDICINE | Age: 74
End: 2019-06-12
Payer: MEDICARE

## 2019-06-12 VITALS
HEIGHT: 65 IN | RESPIRATION RATE: 18 BRPM | HEART RATE: 91 BPM | BODY MASS INDEX: 32.65 KG/M2 | DIASTOLIC BLOOD PRESSURE: 80 MMHG | OXYGEN SATURATION: 97 % | WEIGHT: 196 LBS | SYSTOLIC BLOOD PRESSURE: 128 MMHG

## 2019-06-12 DIAGNOSIS — E78.00 PURE HYPERCHOLESTEROLEMIA: ICD-10-CM

## 2019-06-12 DIAGNOSIS — E66.09 EXOGENOUS OBESITY: ICD-10-CM

## 2019-06-12 DIAGNOSIS — E55.9 VITAMIN D DEFICIENCY: ICD-10-CM

## 2019-06-12 DIAGNOSIS — Z00.00 MEDICARE ANNUAL WELLNESS VISIT, SUBSEQUENT: ICD-10-CM

## 2019-06-12 DIAGNOSIS — Z23 NEED FOR VACCINATION: ICD-10-CM

## 2019-06-12 DIAGNOSIS — I48.0 PAF (PAROXYSMAL ATRIAL FIBRILLATION) (HCC): ICD-10-CM

## 2019-06-12 DIAGNOSIS — E53.8 B12 DEFICIENCY: ICD-10-CM

## 2019-06-12 DIAGNOSIS — R73.01 IFG (IMPAIRED FASTING GLUCOSE): ICD-10-CM

## 2019-06-12 DIAGNOSIS — E03.9 ACQUIRED HYPOTHYROIDISM: ICD-10-CM

## 2019-06-12 DIAGNOSIS — E03.9 ACQUIRED HYPOTHYROIDISM: Primary | ICD-10-CM

## 2019-06-12 LAB
ALBUMIN SERPL-MCNC: 4.5 G/DL (ref 3.5–5.2)
ALP BLD-CCNC: 59 U/L (ref 35–104)
ALT SERPL-CCNC: 18 U/L (ref 5–33)
ANION GAP SERPL CALCULATED.3IONS-SCNC: 16 MMOL/L (ref 7–19)
AST SERPL-CCNC: 23 U/L (ref 5–32)
BILIRUB SERPL-MCNC: 0.8 MG/DL (ref 0.2–1.2)
BUN BLDV-MCNC: 12 MG/DL (ref 8–23)
CALCIUM SERPL-MCNC: 9.2 MG/DL (ref 8.8–10.2)
CHLORIDE BLD-SCNC: 105 MMOL/L (ref 98–111)
CHOLESTEROL, TOTAL: 259 MG/DL (ref 160–199)
CO2: 24 MMOL/L (ref 22–29)
CREAT SERPL-MCNC: 0.5 MG/DL (ref 0.5–0.9)
GFR NON-AFRICAN AMERICAN: >60
GLUCOSE BLD-MCNC: 91 MG/DL (ref 74–109)
HBA1C MFR BLD: 5.1 % (ref 4–6)
HDLC SERPL-MCNC: 67 MG/DL (ref 65–121)
LDL CHOLESTEROL CALCULATED: 159 MG/DL
POTASSIUM SERPL-SCNC: 4.1 MMOL/L (ref 3.5–5)
SODIUM BLD-SCNC: 145 MMOL/L (ref 136–145)
T4 FREE: 1.7 NG/DL (ref 0.9–1.7)
TOTAL PROTEIN: 7.1 G/DL (ref 6.6–8.7)
TRIGL SERPL-MCNC: 165 MG/DL (ref 0–149)
TSH SERPL DL<=0.05 MIU/L-ACNC: 3.97 UIU/ML (ref 0.27–4.2)
VITAMIN B-12: 719 PG/ML (ref 211–946)
VITAMIN D 25-HYDROXY: 47.1 NG/ML

## 2019-06-12 PROCEDURE — 3017F COLORECTAL CA SCREEN DOC REV: CPT | Performed by: INTERNAL MEDICINE

## 2019-06-12 PROCEDURE — G8417 CALC BMI ABV UP PARAM F/U: HCPCS | Performed by: INTERNAL MEDICINE

## 2019-06-12 PROCEDURE — 4040F PNEUMOC VAC/ADMIN/RCVD: CPT | Performed by: INTERNAL MEDICINE

## 2019-06-12 PROCEDURE — G8427 DOCREV CUR MEDS BY ELIG CLIN: HCPCS | Performed by: INTERNAL MEDICINE

## 2019-06-12 PROCEDURE — 1090F PRES/ABSN URINE INCON ASSESS: CPT | Performed by: INTERNAL MEDICINE

## 2019-06-12 PROCEDURE — G8399 PT W/DXA RESULTS DOCUMENT: HCPCS | Performed by: INTERNAL MEDICINE

## 2019-06-12 PROCEDURE — 1123F ACP DISCUSS/DSCN MKR DOCD: CPT | Performed by: INTERNAL MEDICINE

## 2019-06-12 PROCEDURE — 1036F TOBACCO NON-USER: CPT | Performed by: INTERNAL MEDICINE

## 2019-06-12 PROCEDURE — 99214 OFFICE O/P EST MOD 30 MIN: CPT | Performed by: INTERNAL MEDICINE

## 2019-06-12 ASSESSMENT — ENCOUNTER SYMPTOMS
CHEST TIGHTNESS: 0
VOICE CHANGE: 0
COUGH: 0
DIARRHEA: 0
VOMITING: 0
COLOR CHANGE: 0
SINUS PRESSURE: 0
WHEEZING: 0
TROUBLE SWALLOWING: 0
ABDOMINAL PAIN: 0
CONSTIPATION: 0
EYE REDNESS: 0
NAUSEA: 0
BLOOD IN STOOL: 0
EYE PAIN: 0
SORE THROAT: 0

## 2019-06-12 ASSESSMENT — PATIENT HEALTH QUESTIONNAIRE - PHQ9
1. LITTLE INTEREST OR PLEASURE IN DOING THINGS: 0
SUM OF ALL RESPONSES TO PHQ QUESTIONS 1-9: 0
SUM OF ALL RESPONSES TO PHQ QUESTIONS 1-9: 0
2. FEELING DOWN, DEPRESSED OR HOPELESS: 0
SUM OF ALL RESPONSES TO PHQ9 QUESTIONS 1 & 2: 0

## 2019-06-12 NOTE — PROGRESS NOTES
Chief Complaint   Patient presents with    6 Month Follow-Up     History of presenting illness:  Bethany Riley is a72 y.o. female who presents today for follow up on her chronic medical conditions as noted below. Pure hypercholesterolemia-Patient  has tried to follow diet recommendations.  REFUSES TO TAKE RX     Acquired hypothyroidism-she has been taking Synthroid 75 µg daily     IFG (impaired fasting glucose)- she has tried to watch diet and avoid sweets     PAF (paroxysmal atrial fibrillation) (HCC)-she has not had any further episodes        Recent some mild memory issues recetnly- has difficulty remebering names of people     Patient Active Problem List    Diagnosis Date Noted    Non-rheumatic mitral regurgitation 11/16/2017     Overview Note:     2014 mil/ mod      Pure hypercholesterolemia 11/16/2017    IFG (impaired fasting glucose) 11/16/2017    Acquired hypothyroidism 11/16/2017    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    B12 deficiency 11/16/2017    Vitamin D deficiency 11/16/2017    PAF (paroxysmal atrial fibrillation) ([facility] [address] 09/07/2014     Overview Note:     9/6/2014  DCCV in ED  09/07/2014  Echo  Normal LVFX  09/07/2014  lexiscan negative for myocardial ischemia, EF 62 %            Personal history of breast cancer 08/20/2014    S/P lumpectomy, right breast 1/7/14 02/12/2014    Malignant neoplasm of other specified sites of female breast 12/09/2013     Past Medical History:   Diagnosis Date    Breast cancer ([facility] [address] 1/2014    right    GERD (gastroesophageal reflux disease)     Hyperlipidemia     Thyroid disease     Wears glasses       Past Surgical History:   Procedure Laterality Date    BREAST SURGERY Bilateral 1985    FIBROIDS REMOVED FROM EACH BREAST    BREAST SURGERY Right 1/7/14    R partial mastectomy    BREAST SURGERY Right 1/22/14    Rt JUAN R cath insertion    EYE SURGERY      cataracts    MELVINA AND BSO       Current Outpatient Medications   Medication Sig Dispense Refill    suicidal ideas. The patient is not nervous/anxious. Vitals:    06/12/19 1115   BP: 128/80   Site: Left Upper Arm   Position: Sitting   Cuff Size: Large Adult   Pulse: 91   Resp: 18   SpO2: 97%   Weight: 196 lb (88.9 kg)   Height: 5' 5\" (1.651 m)     Body mass index is 32.62 kg/m². Physical Exam   Constitutional: She is oriented to person, place, and time. She appears well-developed and well-nourished. HENT:   Right Ear: External ear normal.   Left Ear: External ear normal.   Mouth/Throat: Oropharynx is clear and moist. No oropharyngeal exudate. Eyes: Pupils are equal, round, and reactive to light. Conjunctivae are normal.   Neck: Neck supple. No JVD present. No thyromegaly present. Cardiovascular: Normal rate and normal heart sounds. No murmur heard. Pulmonary/Chest: Breath sounds normal. No respiratory distress. She has no wheezes. She has no rales. She exhibits no tenderness. Abdominal: Soft. Bowel sounds are normal.   Musculoskeletal: Normal range of motion. Lymphadenopathy:     She has no cervical adenopathy. Neurological: She is oriented to person, place, and time. Skin: Skin is warm. No rash noted.        Lab Review   Orders Only on 06/12/2019   Component Date Value    Vitamin B-12 06/12/2019 719     Vit D, 25-Hydroxy 06/12/2019 47.1     Sodium 06/12/2019 145     Potassium 06/12/2019 4.1     Chloride 06/12/2019 105     CO2 06/12/2019 24     Anion Gap 06/12/2019 16     Glucose 06/12/2019 91     BUN 06/12/2019 12     CREATININE 06/12/2019 0.5     GFR Non- 06/12/2019 >60     Calcium 06/12/2019 9.2     Total Protein 06/12/2019 7.1     Alb 06/12/2019 4.5     Total Bilirubin 06/12/2019 0.8     Alkaline Phosphatase 06/12/2019 59     ALT 06/12/2019 18     AST 06/12/2019 23     Hemoglobin A1C 06/12/2019 5.1     Cholesterol, Total 06/12/2019 259*    Triglycerides 06/12/2019 165*    HDL 06/12/2019 67     LDL Calculated 06/12/2019 159 CBC Auto Differential    Comprehensive Metabolic Panel    Hemoglobin A1C    Lipid Panel    TSH without Reflex    T4, Free    Vitamin D 25 Hydroxy    Vitamin B12    Urinalysis     New Prescriptions    ZOSTER RECOMBINANT ADJUVANTED VACCINE (SHINGRIX) 50 MCG/0.5ML SUSR INJECTION    Inject 0.5 mLs into the muscle once for 1 dose Followed by 0.5 ml IM 2-6 months after dose #1         Return in about 6 months (around 12/12/2019) for Annual Physical.   There are no Patient Instructions on file for this visit. EMR Dragon/transcription disclaimer:Significant part of this  encounter note is electronic transcription/translationof spoken language to printed text. The electronic translation of spoken language may be erroneous, or at times, nonsensical words or phrases may be inadvertently transcribed.  Although I have reviewed the note for sucherrors, some may still exist.

## 2020-01-02 ENCOUNTER — OFFICE VISIT (OUTPATIENT)
Dept: INTERNAL MEDICINE | Age: 75
End: 2020-01-02
Payer: MEDICARE

## 2020-01-02 VITALS
WEIGHT: 194 LBS | RESPIRATION RATE: 18 BRPM | HEIGHT: 65 IN | BODY MASS INDEX: 32.32 KG/M2 | HEART RATE: 71 BPM | SYSTOLIC BLOOD PRESSURE: 112 MMHG | OXYGEN SATURATION: 97 % | DIASTOLIC BLOOD PRESSURE: 78 MMHG

## 2020-01-02 DIAGNOSIS — E78.00 PURE HYPERCHOLESTEROLEMIA: ICD-10-CM

## 2020-01-02 DIAGNOSIS — I48.0 PAF (PAROXYSMAL ATRIAL FIBRILLATION) (HCC): ICD-10-CM

## 2020-01-02 DIAGNOSIS — E53.8 B12 DEFICIENCY: ICD-10-CM

## 2020-01-02 DIAGNOSIS — R73.01 IFG (IMPAIRED FASTING GLUCOSE): ICD-10-CM

## 2020-01-02 DIAGNOSIS — E03.9 ACQUIRED HYPOTHYROIDISM: ICD-10-CM

## 2020-01-02 DIAGNOSIS — E66.09 EXOGENOUS OBESITY: ICD-10-CM

## 2020-01-02 DIAGNOSIS — E55.9 VITAMIN D DEFICIENCY: ICD-10-CM

## 2020-01-02 LAB
ALBUMIN SERPL-MCNC: 4.3 G/DL (ref 3.5–5.2)
ALP BLD-CCNC: 71 U/L (ref 35–104)
ALT SERPL-CCNC: 22 U/L (ref 5–33)
ANION GAP SERPL CALCULATED.3IONS-SCNC: 17 MMOL/L (ref 7–19)
AST SERPL-CCNC: 26 U/L (ref 5–32)
BASOPHILS ABSOLUTE: 0 K/UL (ref 0–0.2)
BASOPHILS RELATIVE PERCENT: 0.6 % (ref 0–1)
BILIRUB SERPL-MCNC: 0.7 MG/DL (ref 0.2–1.2)
BILIRUBIN URINE: NEGATIVE
BLOOD, URINE: NEGATIVE
BUN BLDV-MCNC: 10 MG/DL (ref 8–23)
CALCIUM SERPL-MCNC: 9.3 MG/DL (ref 8.8–10.2)
CHLORIDE BLD-SCNC: 104 MMOL/L (ref 98–111)
CHOLESTEROL, TOTAL: 208 MG/DL (ref 160–199)
CLARITY: CLEAR
CO2: 23 MMOL/L (ref 22–29)
COLOR: YELLOW
CREAT SERPL-MCNC: 0.5 MG/DL (ref 0.5–0.9)
EOSINOPHILS ABSOLUTE: 0.2 K/UL (ref 0–0.6)
EOSINOPHILS RELATIVE PERCENT: 3.3 % (ref 0–5)
GFR NON-AFRICAN AMERICAN: >60
GLUCOSE BLD-MCNC: 88 MG/DL (ref 74–109)
GLUCOSE URINE: NEGATIVE MG/DL
HBA1C MFR BLD: 5.5 % (ref 4–6)
HCT VFR BLD CALC: 44.6 % (ref 37–47)
HDLC SERPL-MCNC: 62 MG/DL (ref 65–121)
HEMOGLOBIN: 14.5 G/DL (ref 12–16)
IMMATURE GRANULOCYTES #: 0 K/UL
KETONES, URINE: NEGATIVE MG/DL
LDL CHOLESTEROL CALCULATED: 126 MG/DL
LEUKOCYTE ESTERASE, URINE: NEGATIVE
LYMPHOCYTES ABSOLUTE: 2.5 K/UL (ref 1.1–4.5)
LYMPHOCYTES RELATIVE PERCENT: 35.1 % (ref 20–40)
MCH RBC QN AUTO: 31.3 PG (ref 27–31)
MCHC RBC AUTO-ENTMCNC: 32.5 G/DL (ref 33–37)
MCV RBC AUTO: 96.3 FL (ref 81–99)
MONOCYTES ABSOLUTE: 0.4 K/UL (ref 0–0.9)
MONOCYTES RELATIVE PERCENT: 6.1 % (ref 0–10)
NEUTROPHILS ABSOLUTE: 3.8 K/UL (ref 1.5–7.5)
NEUTROPHILS RELATIVE PERCENT: 54.6 % (ref 50–65)
NITRITE, URINE: NEGATIVE
PDW BLD-RTO: 12.7 % (ref 11.5–14.5)
PH UA: 7 (ref 5–8)
PLATELET # BLD: 316 K/UL (ref 130–400)
PMV BLD AUTO: 11.4 FL (ref 9.4–12.3)
POTASSIUM SERPL-SCNC: 3.8 MMOL/L (ref 3.5–5)
PROTEIN UA: NEGATIVE MG/DL
RBC # BLD: 4.63 M/UL (ref 4.2–5.4)
SODIUM BLD-SCNC: 144 MMOL/L (ref 136–145)
SPECIFIC GRAVITY UA: 1.01 (ref 1–1.03)
T4 FREE: 1.58 NG/DL (ref 0.93–1.7)
TOTAL PROTEIN: 7.2 G/DL (ref 6.6–8.7)
TRIGL SERPL-MCNC: 98 MG/DL (ref 0–149)
TSH SERPL DL<=0.05 MIU/L-ACNC: 4.29 UIU/ML (ref 0.27–4.2)
UROBILINOGEN, URINE: 0.2 E.U./DL
VITAMIN B-12: 526 PG/ML (ref 211–946)
VITAMIN D 25-HYDROXY: 47.2 NG/ML
WBC # BLD: 7 K/UL (ref 4.8–10.8)

## 2020-01-02 PROCEDURE — G8427 DOCREV CUR MEDS BY ELIG CLIN: HCPCS | Performed by: INTERNAL MEDICINE

## 2020-01-02 PROCEDURE — G0439 PPPS, SUBSEQ VISIT: HCPCS | Performed by: INTERNAL MEDICINE

## 2020-01-02 PROCEDURE — 1123F ACP DISCUSS/DSCN MKR DOCD: CPT | Performed by: INTERNAL MEDICINE

## 2020-01-02 PROCEDURE — 1090F PRES/ABSN URINE INCON ASSESS: CPT | Performed by: INTERNAL MEDICINE

## 2020-01-02 PROCEDURE — G8484 FLU IMMUNIZE NO ADMIN: HCPCS | Performed by: INTERNAL MEDICINE

## 2020-01-02 PROCEDURE — G8417 CALC BMI ABV UP PARAM F/U: HCPCS | Performed by: INTERNAL MEDICINE

## 2020-01-02 PROCEDURE — G8399 PT W/DXA RESULTS DOCUMENT: HCPCS | Performed by: INTERNAL MEDICINE

## 2020-01-02 PROCEDURE — 99214 OFFICE O/P EST MOD 30 MIN: CPT | Performed by: INTERNAL MEDICINE

## 2020-01-02 PROCEDURE — 4040F PNEUMOC VAC/ADMIN/RCVD: CPT | Performed by: INTERNAL MEDICINE

## 2020-01-02 PROCEDURE — 1036F TOBACCO NON-USER: CPT | Performed by: INTERNAL MEDICINE

## 2020-01-02 RX ORDER — LEVOTHYROXINE SODIUM 0.07 MG/1
TABLET ORAL
Qty: 90 TABLET | Refills: 1 | Status: SHIPPED | OUTPATIENT
Start: 2020-01-02 | End: 2020-07-06 | Stop reason: SDUPTHER

## 2020-01-02 ASSESSMENT — ENCOUNTER SYMPTOMS
SORE THROAT: 0
BLOOD IN STOOL: 0
COUGH: 0
CONSTIPATION: 0
SINUS PRESSURE: 0
COLOR CHANGE: 0
VOMITING: 0
WHEEZING: 0
VOICE CHANGE: 0
EYE REDNESS: 0
DIARRHEA: 0
CHEST TIGHTNESS: 0
NAUSEA: 0
TROUBLE SWALLOWING: 0
ABDOMINAL PAIN: 0
EYE PAIN: 0

## 2020-01-02 ASSESSMENT — LIFESTYLE VARIABLES: HOW OFTEN DO YOU HAVE A DRINK CONTAINING ALCOHOL: 0

## 2020-01-02 ASSESSMENT — PATIENT HEALTH QUESTIONNAIRE - PHQ9
SUM OF ALL RESPONSES TO PHQ QUESTIONS 1-9: 0
SUM OF ALL RESPONSES TO PHQ QUESTIONS 1-9: 0

## 2020-01-02 NOTE — PROGRESS NOTES
Hyperlipidemia     Thyroid disease     Wears glasses           Past Surgical History:   Procedure Laterality Date    BREAST SURGERY Bilateral 1985    FIBROIDS REMOVED FROM EACH BREAST    BREAST SURGERY Right 1/7/14    R partial mastectomy    BREAST SURGERY Right 1/22/14    Rt JUAN R cath insertion    EYE SURGERY      cataracts    MELVINA AND BSO         Current Outpatient Medications   Medication Sig Dispense Refill    levothyroxine (SYNTHROID) 75 MCG tablet TAKE ONE TABLET BY MOUTH ONCE DAILY 90 tablet 1    Methylcobalamin (B12-ACTIVE PO) Take by mouth      latanoprost (XALATAN) 0.005 % ophthalmic solution       Vitamin D (CHOLECALCIFEROL) 1000 UNITS CAPS capsule Take 1,000 Units by mouth daily.  aspirin 81 MG tablet Take 81 mg by mouth daily. No current facility-administered medications for this visit.       No Known Allergies    Social History     Socioeconomic History    Marital status:      Spouse name: None    Number of children: None    Years of education: None    Highest education level: None   Occupational History    None   Social Needs    Financial resource strain: None    Food insecurity:     Worry: None     Inability: None    Transportation needs:     Medical: None     Non-medical: None   Tobacco Use    Smoking status: Never Smoker    Smokeless tobacco: Never Used   Substance and Sexual Activity    Alcohol use: No    Drug use: No    Sexual activity: Never   Lifestyle    Physical activity:     Days per week: None     Minutes per session: None    Stress: None   Relationships    Social connections:     Talks on phone: None     Gets together: None     Attends [employer]ology service: None     Active member of club or organization: None     Attends meetings of clubs or organizations: None     Relationship status: None    Intimate partner violence:     Fear of current or ex partner: None     Emotionally abused: None     Physically abused: None     Forced sexual activity: None Other Topics Concern    None   Social History Narrative    None     Family History   Problem Relation Age of Onset    Cancer Mother         throat    Cancer Maternal Grandmother         ovarian    Parkinsonism Father     Breast Cancer Neg Hx        Past Surgical History:   Procedure Laterality Date    BREAST SURGERY Bilateral 1985    FIBROIDS REMOVED FROM EACH BREAST    BREAST SURGERY Right 1/7/14    R partial mastectomy    BREAST SURGERY Right 1/22/14    Rt JUAN R cath insertion    EYE SURGERY      cataracts    MELVINA AND BSO           Lab Review   Orders Only on 01/02/2020   Component Date Value    Color, UA 01/02/2020 YELLOW     Clarity, UA 01/02/2020 Clear     Glucose, Ur 01/02/2020 Negative     Bilirubin Urine 01/02/2020 Negative     Ketones, Urine 01/02/2020 Negative     Specific Gravity, UA 01/02/2020 1.008     Blood, Urine 01/02/2020 Negative     pH, UA 01/02/2020 7.0     Protein, UA 01/02/2020 Negative     Urobilinogen, Urine 01/02/2020 0.2     Nitrite, Urine 01/02/2020 Negative     Leukocyte Esterase, Urine 01/02/2020 Negative     Vitamin B-12 01/02/2020 526     Vit D, 25-Hydroxy 01/02/2020 47.2     Cholesterol, Total 01/02/2020 208*    Triglycerides 01/02/2020 98     HDL 01/02/2020 62*    LDL Calculated 01/02/2020 126     Hemoglobin A1C 01/02/2020 5.5     Sodium 01/02/2020 144     Potassium 01/02/2020 3.8     Chloride 01/02/2020 104     CO2 01/02/2020 23     Anion Gap 01/02/2020 17     Glucose 01/02/2020 88     BUN 01/02/2020 10     CREATININE 01/02/2020 0.5     GFR Non- 01/02/2020 >60     Calcium 01/02/2020 9.3     Total Protein 01/02/2020 7.2     Alb 01/02/2020 4.3     Total Bilirubin 01/02/2020 0.7     Alkaline Phosphatase 01/02/2020 71     ALT 01/02/2020 22     AST 01/02/2020 26     WBC 01/02/2020 7.0     RBC 01/02/2020 4.63     Hemoglobin 01/02/2020 14.5     Hematocrit 01/02/2020 44.6     MCV 01/02/2020 96.3     MCH 01/02/2020 31.3*    Readings from Last 3 Encounters:   20 112/78   19 128/80   18 120/78       Physical Exam  Constitutional:       Appearance: She is well-developed. HENT:      Head: Normocephalic and atraumatic. Right Ear: External ear normal.      Left Ear: External ear normal.   Eyes:      General: No scleral icterus. Conjunctiva/sclera: Conjunctivae normal.      Pupils: Pupils are equal, round, and reactive to light. Neck:      Musculoskeletal: Normal range of motion and neck supple. Thyroid: No thyromegaly. Vascular: No JVD. Cardiovascular:      Rate and Rhythm: Normal rate and regular rhythm. Heart sounds: Normal heart sounds. No murmur. Pulmonary:      Effort: Pulmonary effort is normal. No respiratory distress. Breath sounds: Normal breath sounds. No wheezing. Chest:      Chest wall: No tenderness. Abdominal:      General: Bowel sounds are normal.      Palpations: Abdomen is soft. There is no mass. Tenderness: There is no tenderness. Musculoskeletal: Normal range of motion. General: No tenderness. Lymphadenopathy:      Cervical: No cervical adenopathy. Skin:     General: Skin is warm and dry. Findings: No erythema or rash. Neurological:      Mental Status: She is alert and oriented to person, place, and time. Cranial Nerves: No cranial nerve deficit. Coordination: Coordination normal.      Deep Tendon Reflexes: Reflexes are normal and symmetric. Psychiatric:         Behavior: Behavior normal.           ASSESSMENT/PLAN    MEDICARE WELLNESS SCREENING/ MAINTENANCE:  1. MAMMOGRAM:As per Dr. Rosa Chase  2. SCREENING CSCOPE 2011, repeat 10 years  3. BONE DENSITY SCREENIN normal, repeat 5 years  4. PAP SCREENING:na  5. DIABETES SCREEN - FBS DONE/ ABOVE LABS  6. CARDIOVASCULAR SCREENING- LIPID PANEL  DONE/ ABOVE LABS  7. PNEUMONIA VACCINATION- prevanr 2017/ [facility] 23   8. INFLUENZA VACCINATION: TO BE DONE IN FALL- refuses  9.  HEP B VACCINATION- PT DECLINES  10. HIV/ HEP SCREENING- NA  11. OPTOMETRY/OPTHALMOLOGY APPOINTMENT SUGGESTED FOR GLAUCOMA SCREENING has appt to see dr Cortes Mail:  1. HEALTHY DIET:lower insaturated fats and free sugars  2. EXERCISE suggest at least 30-50 min dialy  3. NO INCREASED FALL RISK ON EXAM    Fall Risk:  Timed Up and Go Test > 12 seconds? (Complete if either Fall Risk answers are Yes): no  2 or more falls in past year?: no  Fall with injury in past year?: no    Depression:  PHQ-2 Score: 0    Cognitive:  Clock Drawing Test (CDT) Score: Normal    ______________________________________________________________________    Management plan for chronic medical conditions:    Pure hypercholesterolemia- LDL is better 126 (159)( 137) (146) (106), she stopped zocor 12 mo ago.  She'll continue lower fat diet and exercise program  During last visit I have presented patient with following data: calculated 10 year risk for atherosclerotic cardiovascular disease event is 17.2  % based on pooled cohort risk assessment  (Pooled Cohort 10y ASCVD Risk Assessment Equation)    Refuses to try again  Understands risk        Acquired hypothyroidism-her thyroid levels =TFT'spending  Await results  For now  patient will continue Synthroid 75 µg daily-      B12 LOW -  Now  ( 719)(366)  CONT 1 MG B12 DAILY PO     IFG (impaired fasting glucose)  OBESITY  = elevated A1c - now is normal at 5.5 (5.1)( 5.3)(5.1) ( 5.3) strict lower carbohydrate diet and weight loss is recommended  Pt was given approximately 5 minutes of counseling about diet and exercise including education on what calories are, where calories come from, the need for portion control,following lower carbohydrate dietary regimen and healthy snacks along side an active lifestyle with supplementary exercise of approx 30 minutes a week, 5 days a week of exercise for weight loss.  The patient voiced increased understanding of the topics discussed.       PAF (paroxysmal atrial fibrillation) ([facility]- no further episdoes      Vitamin D deficiency- level 47 (47)( 32)  Cont 2000 iu daily     Exogenous obesity  Pt was given approximately 5 minutes of counseling about diet and exercise including education on what calories are, where calories come from, the need for portion control,following lower carbohydrate dietary regimen and healthy snacks along side an active lifestyle with supplementary exercise of approx 30 minutes a week, 5 days a week of exercise for weight loss.  The patient voiced increased understanding of the topics discussed.         Orders Placed This Encounter   Procedures    Hemoglobin A1C    Comprehensive Metabolic Panel    Lipid Panel    TSH without Reflex    T4, Free     New Prescriptions    No medications on file        Return in about 6 months (around 7/2/2020) for Medication check. Patient Instructions     Patient Education        Learning About Obesity  What is obesity? Obesity means having a body mass index (BMI) of 30 or above. BMI is a number that is calculated from your weight and your height. How do you know if your weight is in the obesity range? To know if your weight is in the obesity range, your doctor looks at your body mass index (BMI) and waist size. BMI is a number that is calculated from your weight and your height. To figure out your BMI for yourself, you can use an online tool, such as http://www.angulo.com/ on the [employer] of [employer]. If your BMI is 30.0 or higher, it falls within the obesity range. Keep in mind that BMI and waist size are only guides. They are not tools to determine your ideal body weight. What causes obesity? When you take in more calories than you burn off, you gain weight. How you eat, how active you are, and other things affect how your body uses calories and whether you gain weight.   If you have family members who have too much body fat, eating plan for a period of time. These habits are key lifelong changes for managing your weight, with or without other medical treatment. And these changes can help you avoid weight-related health problems. How can you stay on your plan for change? Be ready. Choose to start during a time when there are few events that might trigger slip-ups, like holidays, social events, and high-stress periods. Decide on your first few steps. Most people have more success when they make small changes, one step at a time. For example, you might switch a daily candy bar to a piece of fruit, walk 10 minutes more, or add more vegetables to a meal.  Line up your support people. Make sure you're not going to be alone as you make this change. Connect with people who understand how important it is to you. Ask family members and friends for help in keeping with your plan. And think about who could make it harder for you, and how to handle them. Try tracking. People who keep track of what they eat, feel, and do are better at losing weight. Try writing down things like:  · What and how much you eat. · How you feel before and after each meal.  · Details about each meal (like eating out or at home, eating alone, or with friends or family). · What you do to be active. Look and plan. As you track, look for patterns that you may want to change. Take note of:  · When you eat and whether you skip meals. · How often you eat out. · How many fruits and vegetables you eat. · When you eat beyond feeling full. · When and why you eat for reasons other than being hungry. When you stray from your plan, don't get upset. Figure out what made you slip up and how you can fix it. Can you take medicines or have surgery to lose weight? If you have a BMI in a certain range and have not been able to lose weight with diet and exercise, medicine or surgery may be an option for you.   If you have a BMI of at least 30.0 (or a BMI of at least 27.0 and another health problem related to your weight), ask your doctor about weight-loss medicines. They work by making you feel less hungry, making you feel full more quickly, or changing how you digest fat. Medicines are used along with diet changes and more physical activity to help you make lasting changes. If you have a BMI of 40.0 or more (or a BMI of 35.0 or more and another health problem related to your weight), your doctor may talk with you about surgery. Weight-loss surgery has risks, and you will need to work with your doctor to compare the risk of having obesity with the risks of surgery. With any option you choose, you will still need to eat a healthy diet and get regular exercise. Follow-up care is a key part of your treatment and safety. Be sure to make and go to all appointments, and call your doctor if you are having problems. It's also a good idea to know your test results and keep a list of the medicines you take. Where can you learn more? Go to https://Blue Rooster.Flipswap. org and sign in to your D[employer] account. Enter N111 in the Lantern[employer] box to learn more about \"Learning About Obesity. \"     If you do not have an account, please click on the \"Sign Up Now\" link. Current as of: March 28, 2019  Content Version: 12.1  © 0730-7549 Healthwise, Incorporated. Care instructions adapted under license by [facility] ([facility]). If you have questions about a medical condition or this instruction, always ask your healthcare professional. [place] [address] any warranty or liability for your use of this information.              Orders Placed This Encounter   Procedures    Hemoglobin A1C     Standing Status:   Future     Standing Expiration Date:   1/1/2021    Comprehensive Metabolic Panel     Standing Status:   Future     Standing Expiration Date:   1/1/2021    Lipid Panel     Standing Status:   Future     Standing Expiration Date:   1/1/2021     Order Specific Question:   Is Patient Fasting?/# of Hours     Answer:   fasting    TSH without Reflex     Standing Status:   Future     Standing Expiration Date:   1/1/2021    T4, Free     Standing Status:   Future     Standing Expiration Date:   1/1/2021       EMR Dragon/transcriptiondisclaimer:Significant part of this  encounter note is electronic transcription/translation of spoken language to printed text. The electronic translation of spoken language may be erroneous, or at times, nonsensical wordsor phrases may be inadvertently transcribed.  Although I have reviewed the note for such errors, some may still exist.

## 2020-01-02 NOTE — PATIENT INSTRUCTIONS
Patient Education        Learning About Obesity  What is obesity? Obesity means having a body mass index (BMI) of 30 or above. BMI is a number that is calculated from your weight and your height. How do you know if your weight is in the obesity range? To know if your weight is in the obesity range, your doctor looks at your body mass index (BMI) and waist size. BMI is a number that is calculated from your weight and your height. To figure out your BMI for yourself, you can use an online tool, such as http://www.angulo.com/ on the [employer] of [employer]. If your BMI is 30.0 or higher, it falls within the obesity range. Keep in mind that BMI and waist size are only guides. They are not tools to determine your ideal body weight. What causes obesity? When you take in more calories than you burn off, you gain weight. How you eat, how active you are, and other things affect how your body uses calories and whether you gain weight. If you have family members who have too much body fat, you may have inherited a tendency to gain weight. And your family also helps form your eating and lifestyle habits, which can lead to obesity. Also, our busy lives make it harder to plan and cook healthy meals. For many of us, it's easier to reach for prepared foods, go out to eat, or go to the drive-through. But these foods are often high in saturated fat and calories. Portions are often too large. What can you do to reach a healthy weight? Focus on health, not diets. Diets are hard to stay on and don't work in the long run. It is very hard to stay with a diet that includes lots of big changes in your eating habits. Instead of a diet, focus on lifestyle changes that will improve your health and achieve the right balance of energy and calories. To lose weight, you need to burn more calories than you take in.  You can do it by eating healthy foods in reasonable amounts and becoming more active, even a little bit every day. Making small changes over time can add up to a lot. Make a plan for change. Many people have found that naming their reasons for change and staying focused on their plan can make a big difference. Work with your doctor to create a plan that is right for you. · Ask yourself: Sondra Sharma are my personal, most powerful reasons for wanting this change? What will my life look like when I've made the change? \"  · Set your long-term goal. Make it specific, such as \"I will lose x pounds. \"  · Break your long-term goal into smaller, short-term goals. Make these small steps specific and within your reach, things you know you can do. These steps are what keep you going from day to day. Talk with your doctor about other weight-loss options. If you have a BMI in a certain range and have not been able to lose weight with diet and exercise, medicine or surgery may be an option for you. Before your doctor will prescribe medicines or surgery, he or she will probably want you to be more active and follow your healthy eating plan for a period of time. These habits are key lifelong changes for managing your weight, with or without other medical treatment. And these changes can help you avoid weight-related health problems. How can you stay on your plan for change? Be ready. Choose to start during a time when there are few events that might trigger slip-ups, like holidays, social events, and high-stress periods. Decide on your first few steps. Most people have more success when they make small changes, one step at a time. For example, you might switch a daily candy bar to a piece of fruit, walk 10 minutes more, or add more vegetables to a meal.  Line up your support people. Make sure you're not going to be alone as you make this change. Connect with people who understand how important it is to you. Ask family members and friends for help in keeping with your plan.  And think about who could make it harder for you, and how to handle them. Try tracking. People who keep track of what they eat, feel, and do are better at losing weight. Try writing down things like:  · What and how much you eat. · How you feel before and after each meal.  · Details about each meal (like eating out or at home, eating alone, or with friends or family). · What you do to be active. Look and plan. As you track, look for patterns that you may want to change. Take note of:  · When you eat and whether you skip meals. · How often you eat out. · How many fruits and vegetables you eat. · When you eat beyond feeling full. · When and why you eat for reasons other than being hungry. When you stray from your plan, don't get upset. Figure out what made you slip up and how you can fix it. Can you take medicines or have surgery to lose weight? If you have a BMI in a certain range and have not been able to lose weight with diet and exercise, medicine or surgery may be an option for you. If you have a BMI of at least 30.0 (or a BMI of at least 27.0 and another health problem related to your weight), ask your doctor about weight-loss medicines. They work by making you feel less hungry, making you feel full more quickly, or changing how you digest fat. Medicines are used along with diet changes and more physical activity to help you make lasting changes. If you have a BMI of 40.0 or more (or a BMI of 35.0 or more and another health problem related to your weight), your doctor may talk with you about surgery. Weight-loss surgery has risks, and you will need to work with your doctor to compare the risk of having obesity with the risks of surgery. With any option you choose, you will still need to eat a healthy diet and get regular exercise. Follow-up care is a key part of your treatment and safety. Be sure to make and go to all appointments, and call your doctor if you are having problems.  It's also a good idea to know your

## 2020-01-17 ENCOUNTER — HOSPITAL ENCOUNTER (OUTPATIENT)
Dept: WOMENS IMAGING | Age: 75
Discharge: HOME | End: 2020-01-17
Payer: MEDICARE

## 2020-01-17 PROCEDURE — 77063 BREAST TOMOSYNTHESIS BI: CPT

## 2020-02-04 ENCOUNTER — HOSPITAL ENCOUNTER (OUTPATIENT)
Dept: WOMENS IMAGING | Age: 75
Discharge: HOME | End: 2020-02-04
Payer: MEDICARE

## 2020-02-04 PROCEDURE — 76642 ULTRASOUND BREAST LIMITED: CPT

## 2020-02-04 PROCEDURE — G0279 TOMOSYNTHESIS, MAMMO: HCPCS

## 2020-07-06 ENCOUNTER — OFFICE VISIT (OUTPATIENT)
Dept: INTERNAL MEDICINE | Age: 75
End: 2020-07-06
Payer: MEDICARE

## 2020-07-06 VITALS
OXYGEN SATURATION: 98 % | WEIGHT: 196 LBS | RESPIRATION RATE: 18 BRPM | SYSTOLIC BLOOD PRESSURE: 118 MMHG | BODY MASS INDEX: 32.65 KG/M2 | HEIGHT: 65 IN | HEART RATE: 80 BPM | DIASTOLIC BLOOD PRESSURE: 70 MMHG

## 2020-07-06 DIAGNOSIS — E66.09 EXOGENOUS OBESITY: ICD-10-CM

## 2020-07-06 DIAGNOSIS — E53.8 B12 DEFICIENCY: ICD-10-CM

## 2020-07-06 DIAGNOSIS — I48.0 PAF (PAROXYSMAL ATRIAL FIBRILLATION) (HCC): ICD-10-CM

## 2020-07-06 DIAGNOSIS — R73.01 IFG (IMPAIRED FASTING GLUCOSE): ICD-10-CM

## 2020-07-06 DIAGNOSIS — E55.9 VITAMIN D DEFICIENCY: ICD-10-CM

## 2020-07-06 DIAGNOSIS — E78.00 PURE HYPERCHOLESTEROLEMIA: ICD-10-CM

## 2020-07-06 DIAGNOSIS — E03.9 ACQUIRED HYPOTHYROIDISM: ICD-10-CM

## 2020-07-06 DIAGNOSIS — Z00.00 MEDICARE ANNUAL WELLNESS VISIT, SUBSEQUENT: ICD-10-CM

## 2020-07-06 LAB
ALBUMIN SERPL-MCNC: 4.5 G/DL (ref 3.5–5.2)
ALP BLD-CCNC: 71 U/L (ref 35–104)
ALT SERPL-CCNC: 14 U/L (ref 5–33)
ANION GAP SERPL CALCULATED.3IONS-SCNC: 14 MMOL/L (ref 7–19)
AST SERPL-CCNC: 19 U/L (ref 5–32)
BILIRUB SERPL-MCNC: 0.7 MG/DL (ref 0.2–1.2)
BUN BLDV-MCNC: 12 MG/DL (ref 8–23)
CALCIUM SERPL-MCNC: 9.3 MG/DL (ref 8.8–10.2)
CHLORIDE BLD-SCNC: 104 MMOL/L (ref 98–111)
CHOLESTEROL, TOTAL: 272 MG/DL (ref 160–199)
CO2: 23 MMOL/L (ref 22–29)
CREAT SERPL-MCNC: 0.6 MG/DL (ref 0.5–0.9)
GFR NON-AFRICAN AMERICAN: >60
GLUCOSE BLD-MCNC: 95 MG/DL (ref 74–109)
HBA1C MFR BLD: 5.3 % (ref 4–6)
HDLC SERPL-MCNC: 70 MG/DL (ref 65–121)
LDL CHOLESTEROL CALCULATED: 173 MG/DL
POTASSIUM SERPL-SCNC: 4.2 MMOL/L (ref 3.5–5)
SODIUM BLD-SCNC: 141 MMOL/L (ref 136–145)
T4 FREE: 1.42 NG/DL (ref 0.93–1.7)
TOTAL PROTEIN: 7.1 G/DL (ref 6.6–8.7)
TRIGL SERPL-MCNC: 146 MG/DL (ref 0–149)
TSH SERPL DL<=0.05 MIU/L-ACNC: 3.5 UIU/ML (ref 0.27–4.2)

## 2020-07-06 PROCEDURE — G8417 CALC BMI ABV UP PARAM F/U: HCPCS | Performed by: INTERNAL MEDICINE

## 2020-07-06 PROCEDURE — 4040F PNEUMOC VAC/ADMIN/RCVD: CPT | Performed by: INTERNAL MEDICINE

## 2020-07-06 PROCEDURE — 99214 OFFICE O/P EST MOD 30 MIN: CPT | Performed by: INTERNAL MEDICINE

## 2020-07-06 PROCEDURE — 1123F ACP DISCUSS/DSCN MKR DOCD: CPT | Performed by: INTERNAL MEDICINE

## 2020-07-06 PROCEDURE — 1036F TOBACCO NON-USER: CPT | Performed by: INTERNAL MEDICINE

## 2020-07-06 PROCEDURE — G8399 PT W/DXA RESULTS DOCUMENT: HCPCS | Performed by: INTERNAL MEDICINE

## 2020-07-06 PROCEDURE — G8427 DOCREV CUR MEDS BY ELIG CLIN: HCPCS | Performed by: INTERNAL MEDICINE

## 2020-07-06 PROCEDURE — 1090F PRES/ABSN URINE INCON ASSESS: CPT | Performed by: INTERNAL MEDICINE

## 2020-07-06 RX ORDER — LEVOTHYROXINE SODIUM 0.07 MG/1
TABLET ORAL
Qty: 90 TABLET | Refills: 1 | Status: SHIPPED | OUTPATIENT
Start: 2020-07-06 | End: 2021-01-04 | Stop reason: SDUPTHER

## 2020-07-06 ASSESSMENT — ENCOUNTER SYMPTOMS
WHEEZING: 0
NAUSEA: 0
ABDOMINAL PAIN: 0
CHEST TIGHTNESS: 0
BLOOD IN STOOL: 0
SINUS PRESSURE: 0
TROUBLE SWALLOWING: 0
VOICE CHANGE: 0
EYE PAIN: 0
EYE REDNESS: 0
SORE THROAT: 0
DIARRHEA: 0
COUGH: 0
COLOR CHANGE: 0
CONSTIPATION: 0
VOMITING: 0

## 2020-07-06 ASSESSMENT — PATIENT HEALTH QUESTIONNAIRE - PHQ9
1. LITTLE INTEREST OR PLEASURE IN DOING THINGS: 0
SUM OF ALL RESPONSES TO PHQ QUESTIONS 1-9: 0
SUM OF ALL RESPONSES TO PHQ9 QUESTIONS 1 & 2: 0
2. FEELING DOWN, DEPRESSED OR HOPELESS: 0
SUM OF ALL RESPONSES TO PHQ QUESTIONS 1-9: 0

## 2020-07-06 NOTE — PROGRESS NOTES
Chief Complaint   Patient presents with    6 Month Follow-Up     History of presenting illness:  Joe Calderon is a70 y.o. female who presents today for follow up on her chronic medical conditions as noted below. Pure hypercholesterolemia-Patient  has tried to follow diet recommendations.  REFUSES TO TAKE RX     Acquired hypothyroidism-she has been taking Synthroid 75 µg daily     IFG (impaired fasting glucose)- she has tried to watch diet and avoid sweets     PAF (paroxysmal atrial fibrillation) (HCC)-she has not had any further episodes        Recent some mild memory issues recetnly- has difficulty remebering names of people    Patient Active Problem List    Diagnosis Date Noted    Non-rheumatic mitral regurgitation 11/16/2017     Overview Note:     2014 mil/ mod      Pure hypercholesterolemia 11/16/2017    IFG (impaired fasting glucose) 11/16/2017    Acquired hypothyroidism 11/16/2017    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    B12 deficiency 11/16/2017    Vitamin D deficiency 11/16/2017    PAF (paroxysmal atrial fibrillation) ([facility] [address] 09/07/2014     Overview Note:     9/6/2014  DCCV in ED  09/07/2014  Echo  Normal LVFX  09/07/2014  lexiscan negative for myocardial ischemia, EF 62 %            Personal history of breast cancer 08/20/2014    S/P lumpectomy, right breast 1/7/14 02/12/2014    Malignant neoplasm of other specified sites of female breast 12/09/2013     Past Medical History:   Diagnosis Date    Breast cancer ([facility] [address] 1/2014    right    GERD (gastroesophageal reflux disease)     Hyperlipidemia     Thyroid disease     Wears glasses       Past Surgical History:   Procedure Laterality Date    BREAST SURGERY Bilateral 1985    FIBROIDS REMOVED FROM EACH BREAST    BREAST SURGERY Right 1/7/14    R partial mastectomy    BREAST SURGERY Right 1/22/14    Rt JUAN R cath insertion    EYE SURGERY      cataracts    MELVINA AND BSO       Current Outpatient Medications   Medication Sig Dispense Refill    levothyroxine (SYNTHROID) 75 MCG tablet TAKE ONE TABLET BY MOUTH ONCE DAILY 90 tablet 1    latanoprost (XALATAN) 0.005 % ophthalmic solution       aspirin 81 MG tablet Take 81 mg by mouth daily.  Vitamin D (CHOLECALCIFEROL) 1000 UNITS CAPS capsule Take 1,000 Units by mouth daily. No current facility-administered medications for this visit. No Known Allergies  Social History     Tobacco Use    Smoking status: Never Smoker    Smokeless tobacco: Never Used   Substance Use Topics    Alcohol use: No      Family History   Problem Relation Age of Onset    Cancer Mother         throat    Cancer Maternal Grandmother         ovarian    Parkinsonism Father     Breast Cancer Neg Hx        Review of Systems   Constitutional: Negative for chills, fatigue and fever. HENT: Negative for congestion, ear pain, postnasal drip, sinus pressure, sore throat, trouble swallowing and voice change. Eyes: Negative for pain, redness and visual disturbance. Respiratory: Negative for cough, chest tightness and wheezing. Cardiovascular: Negative for chest pain, palpitations and leg swelling. Gastrointestinal: Negative for abdominal pain, blood in stool, constipation, diarrhea, nausea and vomiting. Endocrine: Negative for polydipsia and polyuria. Genitourinary: Negative for dysuria, enuresis, flank pain, frequency and urgency. Musculoskeletal: Negative for arthralgias, gait problem and joint swelling. Skin: Negative for color change and rash. Neurological: Negative for dizziness, tremors, syncope, facial asymmetry, speech difficulty, weakness, numbness and headaches. Psychiatric/Behavioral: Negative for agitation, behavioral problems, confusion, sleep disturbance and suicidal ideas. The patient is nervous/anxious.       Vitals:    07/06/20 1026   BP: 118/70   Site: Left Upper Arm   Position: Sitting   Cuff Size: Large Adult   Pulse: 80   Resp: 18   SpO2: 98%   Weight: 196 lb (88.9 kg)   Height: 5' 5\" program  During last visit I have presented patient with following data: calculated 10 year risk for atherosclerotic cardiovascular disease event is 22.2  % based on pooled cohort risk assessment  (Pooled Cohort 10y ASCVD Risk Assessment Equation)     Refuses to try again  Understands risk        Acquired hypothyroidism-her thyroid levels =TFT'good range  continue Synthroid 75 µg daily-      B12 Now  ( 440)(897)  CONT 1 MG B12 DAILY PO     IFG (impaired fasting glucose)  OBESITY  = elevated A1c - now is 5.3 ( 5.5) (5.1)( 5.3)(5.1) ( 5.3) strict lower carbohydrate diet and weight loss is recommended  Pt was given approximately 5 minutes of counseling about diet and exercise including education on what calories are, where calories come from, the need for portion control,following lower carbohydrate dietary regimen and healthy snacks along side an active lifestyle with supplementary exercise of approx 30 minutes a week, 5 days a week of exercise for weight loss.  The patient voiced increased understanding of the topics discussed.       PAF (paroxysmal atrial fibrillation) (HCC)- no further episdoes      Vitamin D deficiency- level 47 (47)( 32)  Cont 2000 iu daily     Exogenous obesity  Pt was given approximately 5 minutes of counseling about diet and exercise including education on what calories are, where calories come from, the need for portion control,following lower carbohydrate dietary regimen and healthy snacks along side an active lifestyle with supplementary exercise of approx 30 minutes a week, 5 days a week of exercise for weight loss.  The patient voiced increased understanding of the topics discussed.             Orders Placed This Encounter   Procedures    Lipid Panel    Hemoglobin A1C    Comprehensive Metabolic Panel    CBC Auto Differential    Urinalysis    TSH without Reflex    T4, Free    Vitamin D 25 Hydroxy     New Prescriptions    No medications on file         No follow-ups on file. There are no Patient Instructions on file for this visit. EMR Dragon/transcription disclaimer:Significant part of this  encounter note is electronic transcription/translationof spoken language to printed text. The electronic translation of spoken language may be erroneous, or at times, nonsensical words or phrases may be inadvertently transcribed.  Although I have reviewed the note for sucherrors, some may still exist.

## 2021-01-04 ENCOUNTER — OFFICE VISIT (OUTPATIENT)
Dept: INTERNAL MEDICINE | Age: 76
End: 2021-01-04
Payer: MEDICARE

## 2021-01-04 VITALS
HEART RATE: 74 BPM | RESPIRATION RATE: 18 BRPM | HEIGHT: 65 IN | BODY MASS INDEX: 33.66 KG/M2 | DIASTOLIC BLOOD PRESSURE: 78 MMHG | WEIGHT: 202 LBS | OXYGEN SATURATION: 95 % | SYSTOLIC BLOOD PRESSURE: 128 MMHG

## 2021-01-04 DIAGNOSIS — E66.09 EXOGENOUS OBESITY: ICD-10-CM

## 2021-01-04 DIAGNOSIS — I48.0 PAF (PAROXYSMAL ATRIAL FIBRILLATION) (HCC): ICD-10-CM

## 2021-01-04 DIAGNOSIS — E55.9 VITAMIN D DEFICIENCY: ICD-10-CM

## 2021-01-04 DIAGNOSIS — E03.9 ACQUIRED HYPOTHYROIDISM: ICD-10-CM

## 2021-01-04 DIAGNOSIS — Z00.00 ROUTINE GENERAL MEDICAL EXAMINATION AT A HEALTH CARE FACILITY: ICD-10-CM

## 2021-01-04 DIAGNOSIS — E78.00 PURE HYPERCHOLESTEROLEMIA: ICD-10-CM

## 2021-01-04 DIAGNOSIS — Z00.00 MEDICARE ANNUAL WELLNESS VISIT, SUBSEQUENT: Primary | ICD-10-CM

## 2021-01-04 DIAGNOSIS — E53.8 B12 DEFICIENCY: ICD-10-CM

## 2021-01-04 DIAGNOSIS — R73.01 IFG (IMPAIRED FASTING GLUCOSE): ICD-10-CM

## 2021-01-04 DIAGNOSIS — Z11.59 NEED FOR HEPATITIS C SCREENING TEST: ICD-10-CM

## 2021-01-04 DIAGNOSIS — Z12.11 COLON CANCER SCREENING: ICD-10-CM

## 2021-01-04 LAB
ALBUMIN SERPL-MCNC: 4.1 G/DL (ref 3.5–5.2)
ALP BLD-CCNC: 68 U/L (ref 35–104)
ALT SERPL-CCNC: 14 U/L (ref 5–33)
ANION GAP SERPL CALCULATED.3IONS-SCNC: 9 MMOL/L (ref 7–19)
AST SERPL-CCNC: 17 U/L (ref 5–32)
BACTERIA: NEGATIVE /HPF
BASOPHILS ABSOLUTE: 0 K/UL (ref 0–0.2)
BASOPHILS RELATIVE PERCENT: 0.6 % (ref 0–1)
BILIRUB SERPL-MCNC: 0.6 MG/DL (ref 0.2–1.2)
BILIRUBIN URINE: NEGATIVE
BLOOD, URINE: NEGATIVE
BUN BLDV-MCNC: 14 MG/DL (ref 8–23)
CALCIUM SERPL-MCNC: 9 MG/DL (ref 8.8–10.2)
CHLORIDE BLD-SCNC: 108 MMOL/L (ref 98–111)
CHOLESTEROL, TOTAL: 258 MG/DL (ref 160–199)
CLARITY: CLEAR
CO2: 25 MMOL/L (ref 22–29)
COLOR: YELLOW
CREAT SERPL-MCNC: 0.5 MG/DL (ref 0.5–0.9)
CRYSTALS, UA: ABNORMAL /HPF
EOSINOPHILS ABSOLUTE: 0.2 K/UL (ref 0–0.6)
EOSINOPHILS RELATIVE PERCENT: 3.4 % (ref 0–5)
EPITHELIAL CELLS, UA: 13 /HPF (ref 0–5)
GFR AFRICAN AMERICAN: >59
GFR NON-AFRICAN AMERICAN: >60
GLUCOSE BLD-MCNC: 93 MG/DL (ref 74–109)
GLUCOSE URINE: NEGATIVE MG/DL
HBA1C MFR BLD: 5.6 % (ref 4–6)
HCT VFR BLD CALC: 45.4 % (ref 37–47)
HDLC SERPL-MCNC: 72 MG/DL (ref 65–121)
HEMOGLOBIN: 14.9 G/DL (ref 12–16)
HYALINE CASTS: 6 /HPF (ref 0–8)
IMMATURE GRANULOCYTES #: 0 K/UL
KETONES, URINE: NEGATIVE MG/DL
LDL CHOLESTEROL CALCULATED: 156 MG/DL
LEUKOCYTE ESTERASE, URINE: ABNORMAL
LYMPHOCYTES ABSOLUTE: 2.4 K/UL (ref 1.1–4.5)
LYMPHOCYTES RELATIVE PERCENT: 37 % (ref 20–40)
MCH RBC QN AUTO: 31.6 PG (ref 27–31)
MCHC RBC AUTO-ENTMCNC: 32.8 G/DL (ref 33–37)
MCV RBC AUTO: 96.2 FL (ref 81–99)
MONOCYTES ABSOLUTE: 0.5 K/UL (ref 0–0.9)
MONOCYTES RELATIVE PERCENT: 7.8 % (ref 0–10)
NEUTROPHILS ABSOLUTE: 3.2 K/UL (ref 1.5–7.5)
NEUTROPHILS RELATIVE PERCENT: 50.9 % (ref 50–65)
NITRITE, URINE: NEGATIVE
PDW BLD-RTO: 12.8 % (ref 11.5–14.5)
PH UA: 6 (ref 5–8)
PLATELET # BLD: 246 K/UL (ref 130–400)
PMV BLD AUTO: 11.6 FL (ref 9.4–12.3)
POTASSIUM SERPL-SCNC: 4.1 MMOL/L (ref 3.5–5)
PROTEIN UA: NEGATIVE MG/DL
RBC # BLD: 4.72 M/UL (ref 4.2–5.4)
RBC UA: 1 /HPF (ref 0–4)
SODIUM BLD-SCNC: 142 MMOL/L (ref 136–145)
SPECIFIC GRAVITY UA: 1.02 (ref 1–1.03)
T4 FREE: 1.54 NG/DL (ref 0.93–1.7)
TOTAL PROTEIN: 7 G/DL (ref 6.6–8.7)
TRIGL SERPL-MCNC: 148 MG/DL (ref 0–149)
TSH SERPL DL<=0.05 MIU/L-ACNC: 3.78 UIU/ML (ref 0.27–4.2)
UROBILINOGEN, URINE: 0.2 E.U./DL
VITAMIN D 25-HYDROXY: 33.5 NG/ML
WBC # BLD: 6.4 K/UL (ref 4.8–10.8)
WBC UA: 6 /HPF (ref 0–5)

## 2021-01-04 PROCEDURE — G8484 FLU IMMUNIZE NO ADMIN: HCPCS | Performed by: INTERNAL MEDICINE

## 2021-01-04 PROCEDURE — G0439 PPPS, SUBSEQ VISIT: HCPCS | Performed by: INTERNAL MEDICINE

## 2021-01-04 PROCEDURE — 1123F ACP DISCUSS/DSCN MKR DOCD: CPT | Performed by: INTERNAL MEDICINE

## 2021-01-04 PROCEDURE — 4040F PNEUMOC VAC/ADMIN/RCVD: CPT | Performed by: INTERNAL MEDICINE

## 2021-01-04 RX ORDER — LEVOTHYROXINE SODIUM 0.07 MG/1
TABLET ORAL
Qty: 90 TABLET | Refills: 1 | Status: SHIPPED | OUTPATIENT
Start: 2021-01-04 | End: 2021-07-19

## 2021-01-04 ASSESSMENT — ENCOUNTER SYMPTOMS
WHEEZING: 0
VOMITING: 0
SORE THROAT: 0
COLOR CHANGE: 0
SINUS PRESSURE: 0
NAUSEA: 0
TROUBLE SWALLOWING: 0
BLOOD IN STOOL: 0
CHEST TIGHTNESS: 0
ABDOMINAL PAIN: 0
EYE REDNESS: 0
EYE PAIN: 0
CONSTIPATION: 0
VOICE CHANGE: 0
COUGH: 0
DIARRHEA: 0

## 2021-01-04 ASSESSMENT — PATIENT HEALTH QUESTIONNAIRE - PHQ9
SUM OF ALL RESPONSES TO PHQ QUESTIONS 1-9: 0
SUM OF ALL RESPONSES TO PHQ QUESTIONS 1-9: 0

## 2021-01-04 NOTE — PATIENT INSTRUCTIONS
Personalized Preventive Plan for Cecy Mcwilliams - 1/4/2021  Medicare offers a range of preventive health benefits. Some of the tests and screenings are paid in full while other may be subject to a deductible, co-insurance, and/or copay. Some of these benefits include a comprehensive review of your medical history including lifestyle, illnesses that may run in your family, and various assessments and screenings as appropriate. After reviewing your medical record and screening and assessments performed today your provider may have ordered immunizations, labs, imaging, and/or referrals for you. A list of these orders (if applicable) as well as your Preventive Care list are included within your After Visit Summary for your review. Other Preventive Recommendations:    · A preventive eye exam performed by an eye specialist is recommended every 1-2 years to screen for glaucoma; cataracts, macular degeneration, and other eye disorders. · A preventive dental visit is recommended every 6 months. · Try to get at least 150 minutes of exercise per week or 10,000 steps per day on a pedometer . · Order or download the FREE \"Exercise & Physical Activity: Your Everyday Guide\" from [employer]. Call 2-227.340.6262 or search [employer] online. · You need 5082-7563 mg of calcium and 3548-9346 IU of vitamin D per day. It is possible to meet your calcium requirement with diet alone, but a vitamin D supplement is usually necessary to meet this goal.  · When exposed to the sun, use a sunscreen that protects against both UVA and UVB radiation with an SPF of 30 or greater. Reapply every 2 to 3 hours or after sweating, drying off with a towel, or swimming. · Always wear a seat belt when traveling in a car. Always wear a helmet when riding a bicycle or motorcycle.

## 2021-01-04 NOTE — PROGRESS NOTES
Medicare Annual Wellness Visit  Name: Zaida Velasquez Date: 2021   MRN: [id number] Sex: Female   Age: 66 y.o. Ethnicity: Non-/Non    : 1945 Race: Jerry Luong is here for Medicare AWV    Screenings for behavioral, psychosocial and functional/safety risks, and cognitive dysfunction are all negative except as indicated below. These results, as well as other patient data from the [address] form, are documented in Flowsheets linked to this Encounter. No Known Allergies      Prior to Visit Medications    Medication Sig Taking? Authorizing Provider   levothyroxine (SYNTHROID) 75 MCG tablet TAKE ONE TABLET BY MOUTH ONCE DAILY Yes Jason Valdes MD   latanoprost (XALATAN) 0.005 % ophthalmic solution  Yes Historical Provider, MD   Vitamin D (CHOLECALCIFEROL) 1000 UNITS CAPS capsule Take 2,000 Units by mouth daily  Yes Historical Provider, MD   aspirin 81 MG tablet Take 81 mg by mouth daily.  Yes Historical Provider, MD         Past Medical History:   Diagnosis Date    Breast cancer ([facility] [address] 2014    right    GERD (gastroesophageal reflux disease)     Hyperlipidemia     Thyroid disease     Wears glasses        Past Surgical History:   Procedure Laterality Date    BREAST SURGERY Bilateral     FIBROIDS REMOVED FROM EACH BREAST    BREAST SURGERY Right 14    R partial mastectomy    BREAST SURGERY Right 14    Rt JUAN R cath insertion    EYE SURGERY      cataracts    MELVINA AND BSO           Family History   Problem Relation Age of Onset    Cancer Mother         throat    Cancer Maternal Grandmother         ovarian    Parkinsonism Father     Breast Cancer Neg Hx        CareTeam (Including outside providers/suppliers regularly involved in providing care):   Patient Care Team:  Jason Valdes MD as PCP - General (Internal Medicine)  Jason Valdes MD as PCP - [facility] Empaneled Provider  Vipul Samson (Radiology)    Wt Readings from Last 3 Encounters:   21 202 lb (91.6 kg)   07/06/20 196 lb (88.9 kg)   01/02/20 194 lb (88 kg)     Vitals:    01/04/21 1010   BP: 128/78   Site: Left Upper Arm   Position: Sitting   Cuff Size: Large Adult   Pulse: 74   Resp: 18   SpO2: 95%   Weight: 202 lb (91.6 kg)   Height: 5' 5\" (1.651 m)     Body mass index is 33.61 kg/m². Based upon direct observation of the patient, evaluation of cognition reveals recent and remote memory intact. Patient's complete Health Risk Assessment and screening values have been reviewed and are found in Flowsheets. The following problems were reviewed today and where indicated follow up appointments were made and/or referrals ordered. Positive Risk Factor Screenings with Interventions:            General Health and ACP:  General  In general, how would you say your health is?: Good  In the past 7 days, have you experienced any of the following?  New or Increased Pain, New or Increased Fatigue, Loneliness, Social Isolation, Stress or Anger?: None of These  Do you get the social and emotional support that you need?: Yes  Do you have a Living Will?: (!) No  Advance Directives     Power of [address] [place] [address] Will ACP-Advance Directive ACP-Power of     Not on File Not on File Not on File Not on File      General Health Risk Interventions:  · No Living Will: Advance Care Planning addressed with patient today    Health Habits/Nutrition:  Health Habits/Nutrition  Do you exercise for at least 20 minutes 2-3 times per week?: (!) No  Have you lost any weight without trying in the past 3 months?: No  Do you eat fewer than 2 meals per day?: No  Have you seen a dentist within the past year?: Yes  Body mass index: (!) 33.61  Health Habits/Nutrition Interventions:  · Inadequate physical activity:  patient agrees to exercise for at least 150 minutes/week       Personalized Preventive Plan   Current Health Maintenance Status  Immunization History   Administered Date(s) Administered    Pneumococcal Conjugate 13-valent ([id number]) 2017    Pneumococcal Polysaccharide ([id number]) 2018    Zoster Live (Zostavax) 10/18/2012        Health Maintenance   Topic Date Due    Hepatitis C screen  1945    Annual Wellness Visit (AWV)  2019    DTaP/Tdap/Td vaccine (1 - Tdap) 2021 (Originally 1962)    Flu vaccine (1) 2022 (Originally 2020)    Shingles Vaccine (2 of 3) 2022 (Originally 2012)    TSH testing  2021    DEXA (modify frequency per FRAX score)  Completed    Pneumococcal 65+ years Vaccine  Completed    Hepatitis A vaccine  Aged Out    Hepatitis B vaccine  Aged Out    Hib vaccine  Aged Out    Meningococcal (ACWY) vaccine  Aged Out     Recommendations for [employer] Due: see orders and patient instructions/AVS.  . Recommended screening schedule for the next 5-10 years is provided to the patient in written form: see Patient Instructions/AVS.    MEDICARE WELLNESS SCREENING/ MAINTENANCE:  1. MAMMOGRAM:As per Dr. Bautista Macario  2. SCREENING CSCOPE , repeat 10 years  Patient now over 73  Colonoscopy  normal  Cologuard testing ordered  3. BONE DENSITY SCREENIN normal, repeat 5 years  4. PAP SCREENING:na  5. DIABETES SCREEN - FBS DONE/ ABOVE LABS  6. CARDIOVASCULAR SCREENING- LIPID PANEL  DONE/ ABOVE LABS  7. PNEUMONIA VACCINATION- prevanr 2017/ PPSV 23   8. INFLUENZA VACCINATION: TO BE DONE IN FALL- refuses  9. HEP B VACCINATION- PT DECLINES  10. HIV/ HEP SCREENING- NA  11. OPTOMETRY/OPTHALMOLOGY APPOINTMENT SUGGESTED FOR GLAUCOMA SCREENING has appt to see dr Kia Charlton:  1. HEALTHY DIET:lower insaturated fats and free sugars  2. EXERCISE suggest at least 30-50 min dialy  3. NO INCREASED FALL RISK ON EXAM      Personalized Preventive Plan for Illona Power - 2021  Medicare offers a range of preventive health benefits.  Some of the tests and screenings are paid in full while other may be subject to a deductible, co-insurance, and/or copay. Some of these benefits include a comprehensive review of your medical history including lifestyle, illnesses that may run in your family, and various assessments and screenings as appropriate. After reviewing your medical record and screening and assessments performed today your provider may have ordered immunizations, labs, imaging, and/or referrals for you. A list of these orders (if applicable) as well as your Preventive Care list are included within your After Visit Summary for your review. Other Preventive Recommendations:    · A preventive eye exam performed by an eye specialist is recommended every 1-2 years to screen for glaucoma; cataracts, macular degeneration, and other eye disorders. · A preventive dental visit is recommended every 6 months. · Try to get at least 150 minutes of exercise per week or 10,000 steps per day on a pedometer . · Order or download the FREE \"Exercise & Physical Activity: Your Everyday Guide\" from [employer]. Call 3-680.490.7434 or search [employer] online. · You need 2457-2354 mg of calcium and 5234-8794 IU of vitamin D per day. It is possible to meet your calcium requirement with diet alone, but a vitamin D supplement is usually necessary to meet this goal.  · When exposed to the sun, use a sunscreen that protects against both UVA and UVB radiation with an SPF of 30 or greater. Reapply every 2 to 3 hours or after sweating, drying off with a towel, or swimming. · Always wear a seat belt when traveling in a car. Always wear a helmet when riding a bicycle or motorcycle.

## 2021-01-04 NOTE — PROGRESS NOTES
Follow-up today here for multiple medical problems  History of presenting illness:  Raul Tarango is a63 y.o. female who presents today for follow up on her chronic medical conditions as noted below. Pure hypercholesterolemia-Patient  has tried to follow diet recommendations.  REFUSES TO TAKE RX     Acquired hypothyroidism-she has been taking Synthroid 75 µg daily     IFG (impaired fasting glucose)- she has tried to watch diet and avoid sweets     PAF (paroxysmal atrial fibrillation) (HCC)-she has not had any further episodes        Recent some mild memory issues recetnly- has difficulty remebering names of people       Patient Active Problem List    Diagnosis Date Noted    Non-rheumatic mitral regurgitation 11/16/2017     Overview Note:     2014 mil/ mod      Pure hypercholesterolemia 11/16/2017    IFG (impaired fasting glucose) 11/16/2017    Acquired hypothyroidism 11/16/2017    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    B12 deficiency 11/16/2017    Vitamin D deficiency 11/16/2017    PAF (paroxysmal atrial fibrillation) ([facility] [address] 09/07/2014     Overview Note:     9/6/2014  DCCV in ED  09/07/2014  Echo  Normal LVFX  09/07/2014  lexiscan negative for myocardial ischemia, EF 62 %            Personal history of breast cancer 08/20/2014    S/P lumpectomy, right breast 1/7/14 02/12/2014    Malignant neoplasm of other specified sites of female breast 12/09/2013     Past Medical History:   Diagnosis Date    Breast cancer ([facility] [address] 1/2014    right    GERD (gastroesophageal reflux disease)     Hyperlipidemia     Thyroid disease     Wears glasses       Past Surgical History:   Procedure Laterality Date    BREAST SURGERY Bilateral 1985    FIBROIDS REMOVED FROM EACH BREAST    BREAST SURGERY Right 1/7/14    R partial mastectomy    BREAST SURGERY Right 1/22/14    Rt JUAN R cath insertion    EYE SURGERY      cataracts    MELVINA AND BSO       Current Outpatient Medications   Medication Sig Dispense Refill    levothyroxine (SYNTHROID) 75 MCG tablet TAKE ONE TABLET BY MOUTH ONCE DAILY 90 tablet 1    latanoprost (XALATAN) 0.005 % ophthalmic solution       Vitamin D (CHOLECALCIFEROL) 1000 UNITS CAPS capsule Take 1,000 Units by mouth daily.  aspirin 81 MG tablet Take 81 mg by mouth daily. No current facility-administered medications for this visit. No Known Allergies  Social History     Tobacco Use    Smoking status: Never Smoker    Smokeless tobacco: Never Used   Substance Use Topics    Alcohol use: No      Family History   Problem Relation Age of Onset    Cancer Mother         throat    Cancer Maternal Grandmother         ovarian    Parkinsonism Father     Breast Cancer Neg Hx        Review of Systems   Constitutional: Negative for chills, fatigue and fever. HENT: Negative for congestion, ear pain, postnasal drip, sinus pressure, sore throat, trouble swallowing and voice change. Eyes: Negative for pain, redness and visual disturbance. Respiratory: Negative for cough, chest tightness and wheezing. Cardiovascular: Negative for chest pain, palpitations and leg swelling. Gastrointestinal: Negative for abdominal pain, blood in stool, constipation, diarrhea, nausea and vomiting. Endocrine: Negative for polydipsia and polyuria. Genitourinary: Negative for dysuria, enuresis, flank pain, frequency and urgency. Musculoskeletal: Negative for arthralgias, gait problem and joint swelling. Skin: Negative for color change and rash. Neurological: Negative for dizziness, tremors, syncope, facial asymmetry, speech difficulty, weakness, numbness and headaches. Psychiatric/Behavioral: Negative for agitation, behavioral problems, confusion, sleep disturbance and suicidal ideas. The patient is not nervous/anxious.       Vitals:    01/04/21 1010   BP: 128/78   Site: Left Upper Arm   Position: Sitting   Cuff Size: Large Adult   Pulse: 74   Resp: 18   SpO2: 95%   Weight: 202 lb (91.6 kg)   Height: 5' 5\" (1.651 m) Body mass index is 33.61 kg/m². Physical Exam  Constitutional:       Appearance: She is well-developed. HENT:      Right Ear: External ear normal.      Left Ear: External ear normal.      Mouth/Throat:      Pharynx: No oropharyngeal exudate. Eyes:      Conjunctiva/sclera: Conjunctivae normal.      Pupils: Pupils are equal, round, and reactive to light. Neck:      Musculoskeletal: Neck supple. Thyroid: No thyromegaly. Vascular: No JVD. Cardiovascular:      Rate and Rhythm: Normal rate. Heart sounds: Normal heart sounds. No murmur. Pulmonary:      Effort: No respiratory distress. Breath sounds: Normal breath sounds. No wheezing or rales. Chest:      Chest wall: No tenderness. Abdominal:      General: Bowel sounds are normal.      Palpations: Abdomen is soft. Lymphadenopathy:      Cervical: No cervical adenopathy. Skin:     General: Skin is warm. Findings: No rash. Neurological:      Mental Status: She is oriented to person, place, and time.          Lab Review   Orders Only on 01/04/2021   Component Date Value    Vit D, 25-Hydroxy 01/04/2021 33.5     Color, UA 01/04/2021 YELLOW     Clarity, UA 01/04/2021 Clear     Glucose, Ur 01/04/2021 Negative     Bilirubin Urine 01/04/2021 Negative     Ketones, Urine 01/04/2021 Negative     Specific Gravity, UA 01/04/2021 1.024     Blood, Urine 01/04/2021 Negative     pH, UA 01/04/2021 6.0     Protein, UA 01/04/2021 Negative     Urobilinogen, Urine 01/04/2021 0.2     Nitrite, Urine 01/04/2021 Negative     Leukocyte Esterase, Urine 01/04/2021 SMALL*    WBC 01/04/2021 6.4     RBC 01/04/2021 4.72     Hemoglobin 01/04/2021 14.9     Hematocrit 01/04/2021 45.4     MCV 01/04/2021 96.2     MCH 01/04/2021 31.6*    MCHC 01/04/2021 32.8*    RDW 01/04/2021 12.8     Platelets 55/84/6452 246     MPV 01/04/2021 11.6     Neutrophils % 01/04/2021 50.9     Lymphocytes % 01/04/2021 37.0     Monocytes % 01/04/2021 7.8     Eosinophils % 01/04/2021 3.4     Basophils % 01/04/2021 0.6     Neutrophils Absolute 01/04/2021 3.2     Immature Granulocytes # 01/04/2021 0.0     Lymphocytes Absolute 01/04/2021 2.4     Monocytes Absolute 01/04/2021 0.50     Eosinophils Absolute 01/04/2021 0.20     Basophils Absolute 01/04/2021 0.00     Sodium 01/04/2021 142     Potassium 01/04/2021 4.1     Chloride 01/04/2021 108     CO2 01/04/2021 25     Anion Gap 01/04/2021 9     Glucose 01/04/2021 93     BUN 01/04/2021 14     CREATININE 01/04/2021 0.5     GFR Non- 01/04/2021 >60     GFR  01/04/2021 >59     Calcium 01/04/2021 9.0     Total Protein 01/04/2021 7.0     Alb 01/04/2021 4.1     Total Bilirubin 01/04/2021 0.6     Alkaline Phosphatase 01/04/2021 68     ALT 01/04/2021 14     AST 01/04/2021 17     Hemoglobin A1C 01/04/2021 5.6     Cholesterol, Total 01/04/2021 258*    Triglycerides 01/04/2021 148     HDL 01/04/2021 72     LDL Calculated 01/04/2021 156     Bacteria, UA 01/04/2021 NEGATIVE*    Crystals, UA 01/04/2021 NEG*    Hyaline Casts, UA 01/04/2021 6     WBC, UA 01/04/2021 6*    RBC, UA 01/04/2021 1     Epithelial Cells, UA 01/04/2021 13            ASSESSMENT/PLAN:    Pure hypercholesterolemia-   LDL is better 153 (173 ( 126) (159)( 137) (146) (106)  Off simvastatin since 2019  !!!lower fat diet and exercise program  During last visit I have presented patient with following data: calculated 10 year risk for atherosclerotic cardiovascular disease event is 22.2  % based on pooled cohort risk assessment  (Pooled Cohort 10y ASCVD Risk Assessment Equation)  Refuses to try again  Understands risk        Acquired hypothyroidism-her thyroid levels =  Results reviewed + dw pt  TFT'good range  RX Synthroid 75 µg daily-      B12 Now TC 119 ( 188)(554)  Lab discussed with patient  RX 1 MG B12 DAILY PO     IFG (impaired fasting glucose)  OBESITY  = elevated A1c - now is 5.6 in 1/2021 (5.3) ( 5.5) run in your family, and various assessments and screenings as appropriate. After reviewing your medical record and screening and assessments performed today your provider may have ordered immunizations, labs, imaging, and/or referrals for you. A list of these orders (if applicable) as well as your Preventive Care list are included within your After Visit Summary for your review. Other Preventive Recommendations:    · A preventive eye exam performed by an eye specialist is recommended every 1-2 years to screen for glaucoma; cataracts, macular degeneration, and other eye disorders. · A preventive dental visit is recommended every 6 months. · Try to get at least 150 minutes of exercise per week or 10,000 steps per day on a pedometer . · Order or download the FREE \"Exercise & Physical Activity: Your Everyday Guide\" from [employer]. Call 9-386.266.7075 or search [employer] online. · You need 3550-7979 mg of calcium and 8410-9317 IU of vitamin D per day. It is possible to meet your calcium requirement with diet alone, but a vitamin D supplement is usually necessary to meet this goal.  · When exposed to the sun, use a sunscreen that protects against both UVA and UVB radiation with an SPF of 30 or greater. Reapply every 2 to 3 hours or after sweating, drying off with a towel, or swimming. · Always wear a seat belt when traveling in a car. Always wear a helmet when riding a bicycle or motorcycle. EMR Dragon/transcription disclaimer:Significant part of this  encounter note is electronic transcription/translationof spoken language to printed text. The electronic translation of spoken language may be erroneous, or at times, nonsensical words or phrases may be inadvertently transcribed.  Although I have reviewed the note for sucherrors, some may still exist.

## 2021-01-18 DIAGNOSIS — Z12.11 COLON CANCER SCREENING: ICD-10-CM

## 2021-06-03 ENCOUNTER — APPOINTMENT (OUTPATIENT)
Dept: CT IMAGING | Age: 76
DRG: 065 | End: 2021-06-03
Payer: MEDICARE

## 2021-06-03 ENCOUNTER — APPOINTMENT (OUTPATIENT)
Dept: MRI IMAGING | Age: 76
DRG: 065 | End: 2021-06-03
Payer: MEDICARE

## 2021-06-03 ENCOUNTER — APPOINTMENT (OUTPATIENT)
Dept: GENERAL RADIOLOGY | Age: 76
DRG: 065 | End: 2021-06-03
Payer: MEDICARE

## 2021-06-03 ENCOUNTER — HOSPITAL ENCOUNTER (INPATIENT)
Age: 76
LOS: 1 days | Discharge: HOME HEALTH | DRG: 065 | End: 2021-06-04
Attending: EMERGENCY MEDICINE | Admitting: INTERNAL MEDICINE
Payer: MEDICARE

## 2021-06-03 DIAGNOSIS — I63.9 ACUTE ISCHEMIC STROKE (HCC): Primary | ICD-10-CM

## 2021-06-03 LAB
ALBUMIN SERPL-MCNC: 4.2 G/DL (ref 3.5–5.2)
ALP BLD-CCNC: 74 U/L (ref 35–104)
ALT SERPL-CCNC: <5 U/L (ref 5–33)
ANION GAP SERPL CALCULATED.3IONS-SCNC: 10 MMOL/L (ref 7–19)
APTT: 30.3 SEC (ref 26–36.2)
AST SERPL-CCNC: 19 U/L (ref 5–32)
BASOPHILS ABSOLUTE: 0 K/UL (ref 0–0.2)
BASOPHILS RELATIVE PERCENT: 0.4 % (ref 0–1)
BILIRUB SERPL-MCNC: 0.7 MG/DL (ref 0.2–1.2)
BUN BLDV-MCNC: 10 MG/DL (ref 8–23)
CALCIUM SERPL-MCNC: 9.9 MG/DL (ref 8.8–10.2)
CHLORIDE BLD-SCNC: 106 MMOL/L (ref 98–111)
CHP ED QC CHECK: NORMAL
CO2: 25 MMOL/L (ref 22–29)
CREAT SERPL-MCNC: 0.5 MG/DL (ref 0.5–0.9)
EOSINOPHILS ABSOLUTE: 0.2 K/UL (ref 0–0.6)
EOSINOPHILS RELATIVE PERCENT: 3.1 % (ref 0–5)
GFR AFRICAN AMERICAN: >59
GFR NON-AFRICAN AMERICAN: >60
GLUCOSE BLD-MCNC: 109 MG/DL (ref 74–109)
GLUCOSE BLD-MCNC: 116 MG/DL
GLUCOSE BLD-MCNC: 116 MG/DL (ref 70–99)
GLUCOSE BLD-MCNC: 98 MG/DL (ref 70–99)
HCT VFR BLD CALC: 45.7 % (ref 37–47)
HEMOGLOBIN: 15.2 G/DL (ref 12–16)
IMMATURE GRANULOCYTES #: 0 K/UL
INR BLD: 0.97 (ref 0.88–1.18)
LV EF: 70 %
LVEF MODALITY: NORMAL
LYMPHOCYTES ABSOLUTE: 2 K/UL (ref 1.1–4.5)
LYMPHOCYTES RELATIVE PERCENT: 30.4 % (ref 20–40)
MCH RBC QN AUTO: 31.5 PG (ref 27–31)
MCHC RBC AUTO-ENTMCNC: 33.3 G/DL (ref 33–37)
MCV RBC AUTO: 94.6 FL (ref 81–99)
MONOCYTES ABSOLUTE: 0.4 K/UL (ref 0–0.9)
MONOCYTES RELATIVE PERCENT: 6.1 % (ref 0–10)
NEUTROPHILS ABSOLUTE: 4 K/UL (ref 1.5–7.5)
NEUTROPHILS RELATIVE PERCENT: 59.9 % (ref 50–65)
PDW BLD-RTO: 12.6 % (ref 11.5–14.5)
PERFORMED ON: ABNORMAL
PERFORMED ON: NORMAL
PLATELET # BLD: 286 K/UL (ref 130–400)
PMV BLD AUTO: 11.1 FL (ref 9.4–12.3)
POTASSIUM REFLEX MAGNESIUM: 4.2 MMOL/L (ref 3.5–5)
PROTHROMBIN TIME: 12.8 SEC (ref 12–14.6)
RBC # BLD: 4.83 M/UL (ref 4.2–5.4)
SARS-COV-2, NAAT: NOT DETECTED
SODIUM BLD-SCNC: 141 MMOL/L (ref 136–145)
TOTAL PROTEIN: 7.2 G/DL (ref 6.6–8.7)
TROPONIN: <0.01 NG/ML (ref 0–0.03)
TSH REFLEX FT4: 3.06 UIU/ML (ref 0.35–5.5)
WBC # BLD: 6.7 K/UL (ref 4.8–10.8)

## 2021-06-03 PROCEDURE — 6360000004 HC RX CONTRAST MEDICATION: Performed by: EMERGENCY MEDICINE

## 2021-06-03 PROCEDURE — 99284 EMERGENCY DEPT VISIT MOD MDM: CPT

## 2021-06-03 PROCEDURE — 84484 ASSAY OF TROPONIN QUANT: CPT

## 2021-06-03 PROCEDURE — 84443 ASSAY THYROID STIM HORMONE: CPT

## 2021-06-03 PROCEDURE — 70496 CT ANGIOGRAPHY HEAD: CPT

## 2021-06-03 PROCEDURE — 85025 COMPLETE CBC W/AUTO DIFF WBC: CPT

## 2021-06-03 PROCEDURE — 6370000000 HC RX 637 (ALT 250 FOR IP): Performed by: INTERNAL MEDICINE

## 2021-06-03 PROCEDURE — 99223 1ST HOSP IP/OBS HIGH 75: CPT | Performed by: PSYCHIATRY & NEUROLOGY

## 2021-06-03 PROCEDURE — 2580000003 HC RX 258: Performed by: INTERNAL MEDICINE

## 2021-06-03 PROCEDURE — 70498 CT ANGIOGRAPHY NECK: CPT

## 2021-06-03 PROCEDURE — 87635 SARS-COV-2 COVID-19 AMP PRB: CPT

## 2021-06-03 PROCEDURE — 85730 THROMBOPLASTIN TIME PARTIAL: CPT

## 2021-06-03 PROCEDURE — 70551 MRI BRAIN STEM W/O DYE: CPT

## 2021-06-03 PROCEDURE — 93306 TTE W/DOPPLER COMPLETE: CPT

## 2021-06-03 PROCEDURE — 85610 PROTHROMBIN TIME: CPT

## 2021-06-03 PROCEDURE — 80053 COMPREHEN METABOLIC PANEL: CPT

## 2021-06-03 PROCEDURE — 71045 X-RAY EXAM CHEST 1 VIEW: CPT

## 2021-06-03 PROCEDURE — 1210000000 HC MED SURG R&B

## 2021-06-03 PROCEDURE — 93005 ELECTROCARDIOGRAM TRACING: CPT | Performed by: EMERGENCY MEDICINE

## 2021-06-03 PROCEDURE — 70450 CT HEAD/BRAIN W/O DYE: CPT

## 2021-06-03 PROCEDURE — 36415 COLL VENOUS BLD VENIPUNCTURE: CPT

## 2021-06-03 PROCEDURE — 82947 ASSAY GLUCOSE BLOOD QUANT: CPT

## 2021-06-03 RX ORDER — SODIUM CHLORIDE 9 MG/ML
25 INJECTION, SOLUTION INTRAVENOUS PRN
Status: DISCONTINUED | OUTPATIENT
Start: 2021-06-03 | End: 2021-06-04 | Stop reason: HOSPADM

## 2021-06-03 RX ORDER — ONDANSETRON 2 MG/ML
4 INJECTION INTRAMUSCULAR; INTRAVENOUS EVERY 6 HOURS PRN
Status: DISCONTINUED | OUTPATIENT
Start: 2021-06-03 | End: 2021-06-04 | Stop reason: HOSPADM

## 2021-06-03 RX ORDER — LEVOTHYROXINE SODIUM 0.07 MG/1
75 TABLET ORAL DAILY
Status: DISCONTINUED | OUTPATIENT
Start: 2021-06-04 | End: 2021-06-04 | Stop reason: HOSPADM

## 2021-06-03 RX ORDER — SODIUM CHLORIDE 9 MG/ML
INJECTION, SOLUTION INTRAVENOUS CONTINUOUS
Status: ACTIVE | OUTPATIENT
Start: 2021-06-03 | End: 2021-06-03

## 2021-06-03 RX ORDER — POLYETHYLENE GLYCOL 3350 17 G/17G
17 POWDER, FOR SOLUTION ORAL DAILY PRN
Status: DISCONTINUED | OUTPATIENT
Start: 2021-06-03 | End: 2021-06-04 | Stop reason: HOSPADM

## 2021-06-03 RX ORDER — SODIUM CHLORIDE 0.9 % (FLUSH) 0.9 %
5-40 SYRINGE (ML) INJECTION PRN
Status: DISCONTINUED | OUTPATIENT
Start: 2021-06-03 | End: 2021-06-04 | Stop reason: HOSPADM

## 2021-06-03 RX ORDER — UBIDECARENONE 75 MG
50 CAPSULE ORAL DAILY
Status: DISCONTINUED | OUTPATIENT
Start: 2021-06-03 | End: 2021-06-04 | Stop reason: HOSPADM

## 2021-06-03 RX ORDER — ROSUVASTATIN CALCIUM 20 MG/1
40 TABLET, COATED ORAL NIGHTLY
Status: DISCONTINUED | OUTPATIENT
Start: 2021-06-03 | End: 2021-06-04 | Stop reason: HOSPADM

## 2021-06-03 RX ORDER — VITAMIN B COMPLEX
2000 TABLET ORAL DAILY
Status: DISCONTINUED | OUTPATIENT
Start: 2021-06-03 | End: 2021-06-04 | Stop reason: HOSPADM

## 2021-06-03 RX ORDER — ONDANSETRON 4 MG/1
4 TABLET, ORALLY DISINTEGRATING ORAL EVERY 8 HOURS PRN
Status: DISCONTINUED | OUTPATIENT
Start: 2021-06-03 | End: 2021-06-04 | Stop reason: HOSPADM

## 2021-06-03 RX ORDER — SODIUM CHLORIDE 0.9 % (FLUSH) 0.9 %
5-40 SYRINGE (ML) INJECTION EVERY 12 HOURS SCHEDULED
Status: DISCONTINUED | OUTPATIENT
Start: 2021-06-03 | End: 2021-06-04 | Stop reason: HOSPADM

## 2021-06-03 RX ORDER — LABETALOL HYDROCHLORIDE 5 MG/ML
10 INJECTION, SOLUTION INTRAVENOUS EVERY 10 MIN PRN
Status: DISCONTINUED | OUTPATIENT
Start: 2021-06-03 | End: 2021-06-04 | Stop reason: HOSPADM

## 2021-06-03 RX ORDER — ASPIRIN 81 MG/1
81 TABLET ORAL DAILY
Status: DISCONTINUED | OUTPATIENT
Start: 2021-06-03 | End: 2021-06-04 | Stop reason: HOSPADM

## 2021-06-03 RX ORDER — ASPIRIN 300 MG/1
300 SUPPOSITORY RECTAL DAILY
Status: DISCONTINUED | OUTPATIENT
Start: 2021-06-03 | End: 2021-06-04 | Stop reason: HOSPADM

## 2021-06-03 RX ORDER — UBIDECARENONE 75 MG
50 CAPSULE ORAL DAILY
COMMUNITY

## 2021-06-03 RX ORDER — LATANOPROST 50 UG/ML
1 SOLUTION/ DROPS OPHTHALMIC DAILY
Status: DISCONTINUED | OUTPATIENT
Start: 2021-06-03 | End: 2021-06-04 | Stop reason: HOSPADM

## 2021-06-03 RX ADMIN — SODIUM CHLORIDE: 9 INJECTION, SOLUTION INTRAVENOUS at 15:23

## 2021-06-03 RX ADMIN — ROSUVASTATIN CALCIUM 40 MG: 20 TABLET, FILM COATED ORAL at 20:23

## 2021-06-03 RX ADMIN — IOPAMIDOL 90 ML: 755 INJECTION, SOLUTION INTRAVENOUS at 11:17

## 2021-06-03 ASSESSMENT — ENCOUNTER SYMPTOMS
ABDOMINAL PAIN: 0
PHOTOPHOBIA: 0
VOMITING: 0
COUGH: 0
BACK PAIN: 0
SHORTNESS OF BREATH: 0

## 2021-06-03 NOTE — ED PROVIDER NOTES
Skin: Negative for rash and wound. Neurological: Positive for weakness and numbness. Negative for dizziness, tremors, syncope, speech difficulty and headaches. Psychiatric/Behavioral: Negative for confusion and hallucinations. A complete review of systems was performed and is negative except as noted above in the HPI. PAST MEDICAL HISTORY     Past Medical History:   Diagnosis Date    Breast cancer ([facility] [address] 1/2014    right    GERD (gastroesophageal reflux disease)     Hyperlipidemia     Thyroid disease     Wears glasses          SURGICAL HISTORY       Past Surgical History:   Procedure Laterality Date    BREAST SURGERY Bilateral 1985    FIBROIDS REMOVED FROM EACH BREAST    BREAST SURGERY Right 1/7/14    R partial mastectomy    BREAST SURGERY Right 1/22/14    Rt JUAN R cath insertion    EYE SURGERY      cataracts    MELVINA AND BSO           CURRENT MEDICATIONS       Previous Medications    ASPIRIN 81 MG TABLET    Take 81 mg by mouth daily. LATANOPROST (XALATAN) 0.005 % OPHTHALMIC SOLUTION        LEVOTHYROXINE (SYNTHROID) 75 MCG TABLET    TAKE ONE TABLET BY MOUTH ONCE DAILY    VITAMIN B-12 (CYANOCOBALAMIN) 100 MCG TABLET    Take 50 mcg by mouth daily    VITAMIN D (CHOLECALCIFEROL) 1000 UNITS CAPS CAPSULE    Take 2,000 Units by mouth daily        ALLERGIES     Patient has no known allergies.     FAMILY HISTORY       Family History   Problem Relation Age of Onset    Cancer Mother         throat    Cancer Maternal Grandmother         ovarian    Parkinsonism Father     Breast Cancer Neg Hx           SOCIAL HISTORY       Social History     Socioeconomic History    Marital status:      Spouse name: None    Number of children: None    Years of education: None    Highest education level: None   Occupational History    None   Tobacco Use    Smoking status: Never Smoker    Smokeless tobacco: Never Used   Substance and Sexual Activity    Alcohol use: No    Drug use: No    Sexual activity: Never   Other Topics Concern    None   Social History Narrative    None     Social Determinants of Health     Financial Resource Strain:     Difficulty of Paying Living Expenses:    Food Insecurity:     Worried About Running Out of Food in the Last Year:     [address] in the Last Year:    Transportation Needs:     Lack of Transportation (Medical):  Lack of Transportation (Non-Medical):    Physical Activity:     Days of Exercise per Week:     Minutes of Exercise per Session:    Stress:     Feeling of Stress :    Social Connections:     Frequency of Communication with Friends and Family:     Frequency of Social Gatherings with Friends and Family:     Attends Muslim Services:     Active Member of Clubs or Organizations:     Attends Club or Organization Meetings:     Marital Status:    Intimate Partner Violence:     Fear of Current or Ex-Partner:     Emotionally Abused:     Physically Abused:     Sexually Abused:        SCREENINGS             PHYSICAL EXAM    (up to 7 for level 4, 8 or more for level 5)     ED Triage Vitals [06/03/21 1048]   BP Temp Temp src Pulse Resp SpO2 Height Weight   (!) 155/89 98.8 °F (37.1 °C) -- 78 22 96 % 5' 5\" (1.651 m) 198 lb 6 oz (90 kg)       Physical Exam  Vitals and nursing note reviewed. Constitutional:       General: She is not in acute distress. Appearance: Normal appearance. She is not ill-appearing, toxic-appearing or diaphoretic. Comments: Sitting up in bed well-appearing well-dressed   HENT:      Head: Normocephalic and atraumatic. Nose: Nose normal.      Mouth/Throat:      Mouth: Mucous membranes are moist.   Eyes:      Extraocular Movements: Extraocular movements intact. Pupils: Pupils are equal, round, and reactive to light. Cardiovascular:      Rate and Rhythm: Normal rate and regular rhythm. Pulses: Normal pulses. Pulmonary:      Effort: Pulmonary effort is normal. No respiratory distress.       Breath sounds: Normal breath sounds. Abdominal:      General: Abdomen is flat. There is no distension. Palpations: Abdomen is soft. Tenderness: There is no abdominal tenderness. Musculoskeletal:         General: No swelling or tenderness. Normal range of motion. Cervical back: Normal range of motion and neck supple. No rigidity. Skin:     General: Skin is warm and dry. Neurological:      Mental Status: She is alert. GCS: GCS eye subscore is 4. GCS verbal subscore is 5. GCS motor subscore is 6. Sensory: Sensory deficit present. Motor: Weakness present. Comments: Patient has left-sided weakness left leg greater than the left arm. She appears to have 3 out of 5 in the left leg 4 - in the left arm. Her right side is normal.  The patient has decreased sensation grossly on the left side compared to the right. I do not really get a facial droop or deficit. The patient speech is normal she has no neglect she is awake alert oriented and interactive. She appears in no distress. Gait was not tested. Psychiatric:         Mood and Affect: Mood normal.         Behavior: Behavior normal.         Thought Content: Thought content normal.         Judgment: Judgment normal.         DIAGNOSTIC RESULTS     EKG: All EKG's are interpreted by the Emergency Department Physician who either signs or Co-signs this chart in the absence of a cardiologist.    EKG sinus 78 no acute ischemia. QTc 431 ms. RADIOLOGY:   Non-plain film images such as CT, Ultrasound and MRI are read by the radiologist. Plainradiographic images are visualized and preliminarily interpreted by the emergency physician with the below findings:        Interpretation per the Radiologist below, if available at the time of this note:    MRI BRAIN WO CONTRAST   Final Result   1. Right frontal periventricular white matter within oval 13 x 8 mm   area of acute infarct and associated punctate microhemorrhage.    Results communicated by telephone to Dr. Lia Ulloa at 1:29 PM   6/3/2021. Signed by Dr Tucker Cotter on 6/3/2021 2:32 PM      CTA HEAD W CONTRAST   Final Result   A normal CT angiography of the head. Signed by Dr Nam Jean on 6/3/2021 12:34 PM      CTA NECK W CONTRAST   Final Result   1. Mild calcified atheromatous plaque involving the internal carotid   artery origins bilaterally without stenosis. 2. Otherwise negative CT angiography the neck without hemodynamically   significant stenosis. Signed by Dr Dionte Amato on 6/3/2021 12:31 PM      XR CHEST PORTABLE   Final Result   1. No acute cardiopulmonary finding. Signed by Dr Tucker Cotter on 6/3/2021 12:51 PM      CT HEAD WO CONTRAST   Final Result   Impression:    1. No CT evidence of an acute intracranial process. Signed by Dr Dionte Amato on 6/3/2021 12:15 PM            ED BEDSIDE ULTRASOUND:   Performed by ED Physician - none    LABS:  Labs Reviewed   POCT GLUCOSE - Normal   CBC WITH AUTO DIFFERENTIAL   COMPREHENSIVE METABOLIC PANEL W/ REFLEX TO MG FOR LOW K   TROPONIN   PROTIME-INR   APTT       All other labs were within normal range or not returned as of this dictation. EMERGENCY DEPARTMENT COURSE and DIFFERENTIALDIAGNOSIS/MDM:   Vitals:    Vitals:    06/03/21 1048   BP: (!) 155/89   Pulse: 78   Resp: 22   Temp: 98.8 °F (37.1 °C)   SpO2: 96%   Weight: 198 lb 6 oz (90 kg)   Height: 5' 5\" (1.651 m)       MDM  Number of Diagnoses or Management Options  Acute ischemic stroke [facility])  Diagnosis management comments: Patient last known well 11 PM.  Patient arrives almost 12 hours after last known well. She is outside the window for TPA she woke up at 630 she was not normal.  The patient has left-sided deficits. She has no neglect. The patient had a stroke alert called and was taken to the scanner. Her glucose was 116. Patient clinically with small vessel ischemic stroke. Likely more anterior.   Patient is outside the window for TPA there is no large vessel occlusion. Neurology was consulted. Admit patient medicine service for further work-up. Discussed with hospitalist.  Discussed with neurologist.    Patient nontoxic-appearing appears in no acute distress I discussed with the patient she is had a stroke. Amount and/or Complexity of Data Reviewed  Clinical lab tests: ordered and reviewed  Tests in the radiology section of CPT®: ordered and reviewed  Decide to obtain previous medical records or to obtain history from someone other than the patient: yes  Obtain history from someone other than the patient: yes  Discuss the patient with other providers: yes  Independent visualization of images, tracings, or specimens: yes    Risk of Complications, Morbidity, and/or Mortality  Presenting problems: high    Patient Progress  Patient progress: stable        CONSULTS:  IP CONSULT TO PHARMACY  PHARMACY TO CHANGE BASE FLUIDS  IP CONSULT TO NEUROLOGY    PROCEDURES:  Unless otherwise notedbelow, none     Procedures    FINAL IMPRESSION     1. Acute ischemic stroke ([facility] [address]          DISPOSITION/PLAN   DISPOSITION  admit       PATIENT REFERRED TO:  No follow-up provider specified.     DISCHARGE MEDICATIONS:  New Prescriptions    No medications on file          (Please note that portions of this note were completed with a voice recognition program.  Efforts were made to edit the dictations butoccasionally words are mis-transcribed.)    Angleia Arreaga MD (electronically signed)  AttendingEmergency Physician         Angelia Arreaga MD  06/03/21 2369 Statement Selected

## 2021-06-03 NOTE — ED NOTES
Bed: 02-A  Expected date:   Expected time:   Means of arrival:   Comments:  Pjt in room     Charity Byrd RN  06/03/21 6846

## 2021-06-03 NOTE — H&P
Take 50 mcg by mouth daily   Yes Historical Provider, MD   levothyroxine (SYNTHROID) 75 MCG tablet TAKE ONE TABLET BY MOUTH ONCE DAILY 1/4/21  Yes Leia Ramirez MD   latanoprost (XALATAN) 0.005 % ophthalmic solution Place 1 drop into both eyes nightly  11/7/13  Yes Historical Provider, MD   Vitamin D (CHOLECALCIFEROL) 1000 UNITS CAPS capsule Take 2,000 Units by mouth daily    Yes Historical Provider, MD   aspirin 81 MG tablet Take 81 mg by mouth daily. Yes Historical Provider, MD       Allergies:    Patient has no known allergies. Social History:    Tobacco:   reports that she has never smoked. She has never used smokeless tobacco.  Alcohol:   reports no history of alcohol use. Illicit Drugs: denies    Family History:      Problem Relation Age of Onset    Cancer Mother         throat    Cancer Maternal Grandmother         ovarian    Parkinsonism Father     Breast Cancer Neg Hx        Review of Systems:   Negative except as noted above.         Physical Examination:  BP (!) 130/97   Pulse 70   Temp 98.8 °F (37.1 °C)   Resp 20   Ht 5' 5\" (1.651 m)   Wt 198 lb 6 oz (90 kg)   SpO2 96%   BMI 33.01 kg/m²      General appearance: Alert  Head: NC/AT  Eyes: conjunctivae/corneas clear   Ears: normal external ears  Neck: supple  Lungs: BLAE, no W/R/R appreciated   Heart: RRR, n murmurs appreciated   Abdomen: BS+, soft, NT, ND  Extremities: no edema, pulses+  Skin: warm  Neurologic: Alert, Left sided weakness/drift, decreased sensation left side, CN intact  Psychiatric:  Calm, pleasant, mood appropriate        Diagnostic Data:  EKG: I have reviewed the EKG with the following interpretation: NSR, no acute ischemic changes   CBC:  Recent Labs     06/03/21  1055   WBC 6.7   HGB 15.2   HCT 45.7        BMP:  Recent Labs     06/03/21  1055      K 4.2      CO2 25   BUN 10   CREATININE 0.5   CALCIUM 9.9     Recent Labs     06/03/21  1055   AST 19   ALT <5*   BILITOT 0.7   ALKPHOS 74     Coag Panel: Recent Labs     06/03/21  1055   INR 0.97   PROTIME 12.8   APTT 30.3     Cardiac Enzymes:   Recent Labs     06/03/21  1055   TROPONINI <0.01     ABGs:No results found for: PHART, PO2ART, [id number]  Urinalysis:  Lab Results   Component Value Date    NITRU Negative 01/04/2021    WBCUA 6 01/04/2021    BACTERIA NEGATIVE 01/04/2021    RBCUA 1 01/04/2021    BLOODU Negative 01/04/2021    SPECGRAV 1.024 01/04/2021    GLUCOSEU Negative 01/04/2021     RAD:     MRI BRAIN WO CONTRAST [[id number]] Resulted: 06/03/21 1332      Order Status: Completed Updated: 06/03/21 1334     Narrative:       EXAM: MRI BRAIN WO CONTRAST -- 6/3/2021 12:42 PM   HISTORY: 66 years, Female, CVA, left-sided weakness x1 day   COMPARISON: 6/3/2021 CT head   TECHNIQUE:   Routine pulse sequences of the brain were obtained without IV   contrast.   FINDINGS:   There is a oval 13 x 8 mm area of restricted diffusion consistent with   acute infarct in the right frontal periventricular white matter. Focus   of associated susceptibility artifact suggesting microhemorrhage. Mild T2/FLAIR hyperintensity in the periventricular and subcortical   white matter suggesting mild chronic microvascular changes. Mild   proportional prominence of the ventricles, sulci and basilar cisterns   suggesting volume loss. Partially empty sella. Remaining midline structures appear within   normal limits. Brainstem and posterior fossa are within normal limits. Visualized flow voids within normal limits. Thinning of the ocular   lenses may be postoperative or age-related. Paranasal sinuses and   mastoid air cells appear within normal limits. Visualized overlying   soft tissues within normal limits.     Impression:       1. Right frontal periventricular white matter within oval 13 x 8 mm   area of acute infarct and associated punctate microhemorrhage. Results communicated by telephone to Dr. Dawood Anderson at 1:29 PM   6/3/2021.    Signed by Dr Radha Delarosa on 6/3/2021 2:32 PM   XR CHEST PORTABLE [[id number]] Resulted: 06/03/21 1151     Order Status: Completed Updated: 06/03/21 1153     Narrative:       EXAM: XR CHEST PORTABLE -- 6/3/2021 11:09 AM   HISTORY: 66 years, Female, CVA   COMPARISON: 9/6/2014   TECHNIQUE:  1 images.  Frontal view of the chest.   FINDINGS:     No pneumothorax, pleural effusion or focal consolidation. Calcified   aortic atherosclerosis. Cardiac mediastinal silhouette within normal   limits. Surgical clips at the right chest wall or breast.     Impression:       1. No acute cardiopulmonary finding. Signed by Dr Jamel Alvarez on 6/3/2021 12:51 PM     CTA HEAD W CONTRAST [[id number]] Resulted: 06/03/21 1134     Order Status: Completed Updated: 06/03/21 1136     Narrative:       Examination. CTA HEAD W CONTRAST 6/3/2021 11:17 AM   History: Left-sided weakness. DLP: 3 1 3 mGycm. CT angiography of the head is performed after intravenous contrast   enhancement. The images are acquired in axial plane with subsequent   2-D reconstruction coronal and sagittal planes and 3-D maximum   intensity projection reconstruction. There is no previous similar study for comparison. The correlation   made with CT scan of the head obtained earlier today. Normal size internal carotid arteries are seen at the base of skull   and in the carotid canal bilaterally. Atheromatous plaques are seen in   the internal carotid arteries bilaterally in the cavernous sinus. No   focal stenosis. Normal size suprasellar internal carotid arteries bilaterally is seen. Both internal carotid arteries divide into normal-appearing anterior   and middle cerebral arteries. There is subsequent normal insular and   opercular branches bilaterally. No focal stenosis or aneurysmal   dilatation   Normal size vertebral arteries enter the foramen magnum. Both join to   make a normal-sized basilar artery which subsequently divides into   normal-appearing posterior cerebral arteries bilaterally.  No areas of   focal stenosis or aneurysmal dilatation.     Impression:       A normal CT angiography of the head. Signed by Dr Evelia Boswell on 6/3/2021 12:34 PM     CTA NECK W CONTRAST [[id number]] Resulted: 06/03/21 1131     Order Status: Completed Updated: 06/03/21 1133     Narrative:       EXAMINATION: Emily Flores  6/3/2021 12:25 PM   HISTORY: Stroke symptoms   COMPARISON: Head CT performed today   TECHNIQUE: CT angiography the neck was performed.   Radiation dose   equals  mGy cm. AUTOMATED EXPOSURE CONTROL dose reduction   technique was implemented. Thin section axial images were obtained with intravenous contrast.   Multi projection reconstruction images were generated. Multi projection maximum intensity projection angiographic images were   obtained. ICA stenosis calculated using NASCET criteria. FINDINGS:   There are aortic arch calcifications. There are calcifications   involving the brachycephalic vessel origins arising from the arch   without stenosis. There is ectasia of the left common carotid artery proximally. The   left common carotid arteries widely patent. The carotid bifurcation is widely patent. There is posterior medial calcified atheromatous plaque involving the   proximal left ICA just distal to the bifurcation without stenosis. The left internal carotid arteries widely patent without   steno-occlusive changes. The left external carotid arteries widely patent. There is ectasia of the right common carotid artery proximally. The carotid bifurcation is widely patent. There is posterior calcified atheromatous plaque involving the origin   of the right internal carotid artery without stenosis. The right ICA distally is widely patent without steno-occlusive   changes with mild ectasia. The right external carotid artery is patent and uncompromised. The vertebral arteries are patent without stenosis. The left vertebral   artery is mildly dominant.    The upper lung fields are grossly clear. Degenerative changes of the cervical spine with multilevel disc   degeneration.     Impression:       1. Mild calcified atheromatous plaque involving the internal carotid   artery origins bilaterally without stenosis. 2. Otherwise negative CT angiography the neck without hemodynamically   significant stenosis. Signed by Dr Stan Huston on 6/3/2021 12:31 PM     CT HEAD WO CONTRAST [[id number]] Resulted: 06/03/21 1115     Order Status: Completed Updated: 06/03/21 1117     Narrative:       CT HEAD WO CONTRAST 6/3/2021 10:58 AM   History: Left-sided weakness, code stroke   Comparisons: None   Technique:   Radiation dose equals  mGy cm. AUTOMATED EXPOSURE CONTROL dose   reduction technique was implemented   CT evaluation of the head without intravenous contrast. 5 mm   transaxial images were obtained.   2-D sagittal and coronal   reconstruction images were generated. Findings: There is no intra or extra-axial hemorrhage. There is no mass effect or midline shift. There is no hydrocephalus. There is mild central and cortical atrophy, compatible with age. Mild decreased attenuation in the periventricular and subcortical   white matter observed suggesting mild chronic ischemic changes. Paranasal sinuses imaged part are clear. Hyperostosis frontalis interna observed. There is no skull fracture   acute calvarial abnormality.       These findings were discussed with Dr. Simmie Severin, emergency room   physician, at the time of dictation, 11:15 AM, 6/3/2021.          Impression:       Impression:   1.  No CT evidence of an acute intracranial process.                     Signed by Dr Stan Huston on 6/3/2021 12:15 PM             Active Hospital Problems    Diagnosis Date Noted    Acute CVA (cerebrovascular accident) ([facility] [I63.9] 06/03/2021    GERD (gastroesophageal reflux disease) [K21.9]     Acquired hypothyroidism [E03.9] 11/16/2017    B12 deficiency [E53.8] 11/16/2017    Exogenous obesity [E66.09] 11/16/2017    Non-rheumatic mitral regurgitation [I34.0] 11/16/2017    Vitamin D deficiency [E55.9] 11/16/2017    Pure hypercholesterolemia [E78.00] 11/16/2017    PAF (paroxysmal atrial fibrillation) ([facility] [I48.0] 09/07/2014    Personal history of breast cancer [Z85.3] 08/20/2014    S/P lumpectomy, right breast 1/7/14 [Z98.890] 02/12/2014    Breast cancer ([facility] Donna Campi 01/2014       MPRESSION / PLAN:  Active Problems:    S/P lumpectomy, right breast 1/7/14    Personal history of breast cancer    PAF (paroxysmal atrial fibrillation) (HCC)    Non-rheumatic mitral regurgitation    Pure hypercholesterolemia    Acquired hypothyroidism    Exogenous obesity    B12 deficiency    Vitamin D deficiency    Acute CVA (cerebrovascular accident) ([facility]    Breast cancer ([facility]    GERD (gastroesophageal reflux disease)  Resolved Problems:    * No resolved hospital problems. *    CVA: Statin. Hold aspirin for micro hemorrhages noted on MRI. She took aspirin last night. Permissive HTN. TTE. PT/OT/SLP. Fall precautions. Monitor on telemetry. Neurology consulted from ED. PAF: Takes aspirin at home. Currently in NSR. Telemetry. Hypothyroidism: Synthroid. Vitamin D/B12 deficiency: Continue replacement. Supportive management. DVT ppx: SCDs  Full code.     Sailaja Gomez MD  6/3/2021

## 2021-06-03 NOTE — CONSULTS
TAKE ONE TABLET BY MOUTH ONCE DAILY 1/4/21  Yes Martha Patrick MD   latanoprost (XALATAN) 0.005 % ophthalmic solution Place 1 drop into both eyes nightly  11/7/13  Yes Historical Provider, MD   Vitamin D (CHOLECALCIFEROL) 1000 UNITS CAPS capsule Take 2,000 Units by mouth daily    Yes Historical Provider, MD   aspirin 81 MG tablet Take 81 mg by mouth daily. Yes Historical Provider, MD       Current Medications:  Current Facility-Administered Medications: sodium chloride flush 0.9 % injection 5-40 mL, 5-40 mL, Intravenous, 2 times per day  sodium chloride flush 0.9 % injection 5-40 mL, 5-40 mL, Intravenous, PRN  0.9 % sodium chloride infusion, 25 mL, Intravenous, PRN  ondansetron (ZOFRAN-ODT) disintegrating tablet 4 mg, 4 mg, Oral, Q8H PRN **OR** ondansetron (ZOFRAN) injection 4 mg, 4 mg, Intravenous, Q6H PRN  polyethylene glycol (GLYCOLAX) packet 17 g, 17 g, Oral, Daily PRN  [Held by provider] enoxaparin (LOVENOX) injection 40 mg, 40 mg, Subcutaneous, Daily  [Held by provider] aspirin EC tablet 81 mg, 81 mg, Oral, Daily **OR** [Held by provider] aspirin suppository 300 mg, 300 mg, Rectal, Daily  rosuvastatin (CRESTOR) tablet 40 mg, 40 mg, Oral, Nightly  labetalol (NORMODYNE;TRANDATE) injection 10 mg, 10 mg, Intravenous, Q10 Min PRN  latanoprost (XALATAN) 0.005 % ophthalmic solution 1 drop, 1 drop, Both Eyes, Daily  [START ON 6/4/2021] levothyroxine (SYNTHROID) tablet 75 mcg, 75 mcg, Oral, Daily  vitamin B-12 (CYANOCOBALAMIN) tablet 50 mcg, 50 mcg, Oral, Daily  Vitamin D (CHOLECALCIFEROL) tablet 2,000 Units, 2,000 Units, Oral, Daily  0.9 % sodium chloride infusion, , Intravenous, Continuous    Allergies:  Patient has no known allergies. Habits:  TOBACCO:   reports that she has never smoked. She has never used smokeless tobacco.  ETOH:   reports no history of alcohol use. DRUGS:    Social History     Substance and Sexual Activity   Drug Use No       SOCIAL HISTORY:   Flores Trejo is , lives in [place], [place], and is retired. FAMILY HISTORY:       Problem Relation Age of Onset    Cancer Mother         throat    Cancer Maternal Grandmother         ovarian    Parkinsonism Father     Breast Cancer Neg Hx        REVIEW OF SYSTEMS:  Review of Systems   Constitutional: Negative for chills and fever. HENT: Negative for hearing loss and tinnitus. Eyes: Negative for photophobia and visual disturbance. Respiratory: Negative for cough and shortness of breath. Cardiovascular: Negative for chest pain and palpitations. Gastrointestinal: Negative for nausea and vomiting. Endocrine: Negative for polydipsia and polyuria. Genitourinary: Negative for frequency and urgency. Musculoskeletal: Positive for arthralgias. Negative for back pain. Skin: Negative for rash and wound. Allergic/Immunologic: Negative for environmental allergies and food allergies. Neurological: Positive for weakness and numbness. Negative for tremors, seizures, syncope, speech difficulty, light-headedness and headaches. Hematological: Negative for adenopathy. Does not bruise/bleed easily. Psychiatric/Behavioral: Negative for dysphoric mood. The patient is not nervous/anxious. PHYSICAL EXAMINATION:  Vitals: BP (!) 142/94   Pulse 68   Temp 98.2 °F (36.8 °C) (Temporal)   Resp 18   Ht 5' 5\" (1.651 m)   Wt 198 lb 6 oz (90 kg)   SpO2 92%   BMI 33.01 kg/m²   General appearance: alert, appears stated age and cooperative  Skin: Skin color, texture, turgor normal. No rashes or lesions  HEENT: Head: Normal, normocephalic, atraumatic.   Neck:no adenopathy, no carotid bruit, no JVD, supple, symmetrical, trachea midline and thyroid not enlarged, symmetric, no tenderness/mass/nodules  Lungs: clear to auscultation bilaterally  Heart: regular rate and rhythm, S1, S2 normal, no murmur, click, rub or gallop  Abdomen: soft, non-tender; bowel sounds normal; no masses,  no organomegaly  Extremities: extremities normal, atraumatic, no cyanosis or edema      NEUROLOGIC EXAMINATION:  Neurologic Exam     Mental Status   Oriented to person, place, and time. Speech: speech is normal   Level of consciousness: alert  Able to name object. Able to repeat. Speech is fluent. Cranial Nerves     CN II   Visual fields full to confrontation. CN III, IV, VI   Pupils are equal, round, and reactive to light.   Extraocular motions are normal.     CN VII   Right facial weakness: none  Left facial weakness: central    CN VIII   Hearing: intact    CN IX, X   Palate: symmetric    CN XI   Right sternocleidomastoid strength: normal  Left sternocleidomastoid strength: weak  Right trapezius strength: normal  Left trapezius strength: weak    CN XII   Tongue: not atrophic  Fasciculations: absent  Tongue deviation: none    Motor Exam   Muscle bulk: normal  Overall muscle tone: normal  Right arm pronator drift: absent  Left arm pronator drift: present    Strength   Right neck flexion: 5/5  Left neck flexion: 5/5  Right neck extension: 5/5  Left neck extension: 5/5  Right deltoid: 5/5  Left deltoid: 3/5  Right biceps: 5/5  Left biceps: 4/5  Right triceps: 5/5  Left triceps: 3/5  Right wrist flexion: 5/5  Left wrist flexion: 4/5  Right wrist extension: 5/5  Left wrist extension: 3/5  Right interossei: 5/5  Left interossei: 3/5  Right iliopsoas: 5/5  Left iliopsoas: 3/5  Right quadriceps: 5/5  Left quadriceps: 4/5  Right glutei: 5/5  Left glutei: 3/5  Right anterior tibial: 5/5  Left anterior tibial: 4/5  Right posterior tibial: 5/5  Left posterior tibial: 4/5  Right peroneal: 5/5  Left peroneal: 4/5  Right gastroc: 5/5  Left gastroc: 4/5    Sensory Exam   Light touch normal.   Vibration normal.   Pinprick normal.     Gait, Coordination, and Reflexes     Coordination   Finger to nose coordination: normal    Tremor   Resting tremor: absent  Intention tremor: absent  Action tremor: absent    Reflexes   Right brachioradialis: 1+  Left brachioradialis: 1+  Right biceps: 1+  Left biceps: 1+  Right triceps: 1+  Left triceps: 1+  Right patellar: 1+  Left patellar: 1+  Right achilles: 0  Left achilles: 0  Right plantar: normal  Left plantar: upgoing  Right Gibson: absent  Left Gibson: absent  Right ankle clonus: absent  Left ankle clonus: absent  Rapid alternating movements are impaired in left hand. ADDITIONAL REVIEW:  CBC:    Recent Labs     06/03/21  1055   WBC 6.7   HGB 15.2        BMP:     Recent Labs     06/03/21  1054 06/03/21  1055   NA  --  141   K  --  4.2   CL  --  106   CO2  --  25   BUN  --  10   CREATININE  --  0.5   GLUCOSE 116 109     Hepatic:  Recent Labs     06/03/21  1055   AST 19   ALT <5*   BILITOT 0.7   ALKPHOS 74     Troponin T:    Recent Labs     06/03/21  1055   TROPONINI <0.01     INR:    Recent Labs     06/03/21  1055   INR 0.97         Narrative   EXAMINATION:  CTA NECK W CONTRAST  6/3/2021 12:25 PM   HISTORY: Stroke symptoms   COMPARISON: Head CT performed today   TECHNIQUE: CT angiography the neck was performed.   Radiation dose   equals  mGy cm. AUTOMATED EXPOSURE CONTROL dose reduction   technique was implemented. Thin section axial images were obtained with intravenous contrast.   Multi projection reconstruction images were generated. Multi projection maximum intensity projection angiographic images were   obtained. ICA stenosis calculated using NASCET criteria. FINDINGS:    There are aortic arch calcifications. There are calcifications   involving the brachycephalic vessel origins arising from the arch   without stenosis. There is ectasia of the left common carotid artery proximally. The   left common carotid arteries widely patent. The carotid bifurcation is widely patent. There is posterior medial calcified atheromatous plaque involving the   proximal left ICA just distal to the bifurcation without stenosis. The left internal carotid arteries widely patent without   steno-occlusive changes.    The left external carotid arteries widely patent. There is ectasia of the right common carotid artery proximally. The carotid bifurcation is widely patent. There is posterior calcified atheromatous plaque involving the origin   of the right internal carotid artery without stenosis. The right ICA distally is widely patent without steno-occlusive   changes with mild ectasia. The right external carotid artery is patent and uncompromised. The vertebral arteries are patent without stenosis. The left vertebral   artery is mildly dominant. The upper lung fields are grossly clear. Degenerative changes of the cervical spine with multilevel disc   degeneration.       Impression   1. Mild calcified atheromatous plaque involving the internal carotid   artery origins bilaterally without stenosis. 2. Otherwise negative CT angiography the neck without hemodynamically   significant stenosis. Signed by Dr Iris West on 6/3/2021 12:31 PM     Narrative   Examination. CTA HEAD W CONTRAST 6/3/2021 11:17 AM   History: Left-sided weakness. DLP: 3 1 3 mGycm. CT angiography of the head is performed after intravenous contrast   enhancement. The images are acquired in axial plane with subsequent   2-D reconstruction coronal and sagittal planes and 3-D maximum   intensity projection reconstruction. There is no previous similar study for comparison. The correlation   made with CT scan of the head obtained earlier today. Normal size internal carotid arteries are seen at the base of skull   and in the carotid canal bilaterally. Atheromatous plaques are seen in   the internal carotid arteries bilaterally in the cavernous sinus. No   focal stenosis. Normal size suprasellar internal carotid arteries bilaterally is seen. Both internal carotid arteries divide into normal-appearing anterior   and middle cerebral arteries. There is subsequent normal insular and   opercular branches bilaterally.  No focal stenosis or aneurysmal   dilatation Normal size vertebral arteries enter the foramen magnum. Both join to   make a normal-sized basilar artery which subsequently divides into   normal-appearing posterior cerebral arteries bilaterally. No areas of   focal stenosis or aneurysmal dilatation.       Impression   A normal CT angiography of the head. Signed by Dr Nahomy Mendez on 6/3/2021 12:34 PM       IMPRESSION:  1. Left thalamocapsular infarct. 2. Hypertension  3. Hyperlipidemia  4. History of single episode of atrial fibrillation    PLAN:  1. Echo pending  2. Telemetry  3. Labs  4. PT/OT  5.  ASA/Lovenox    Anticipate adding Plavix,  for PT, and home with Zio patch    Jose Chow M.D.

## 2021-06-04 ENCOUNTER — TELEPHONE (OUTPATIENT)
Dept: NEUROLOGY | Age: 76
End: 2021-06-04

## 2021-06-04 ENCOUNTER — TELEPHONE (OUTPATIENT)
Dept: INTERNAL MEDICINE CLINIC | Age: 76
End: 2021-06-04

## 2021-06-04 VITALS
OXYGEN SATURATION: 94 % | TEMPERATURE: 97 F | WEIGHT: 198.38 LBS | HEIGHT: 65 IN | SYSTOLIC BLOOD PRESSURE: 139 MMHG | RESPIRATION RATE: 16 BRPM | HEART RATE: 65 BPM | DIASTOLIC BLOOD PRESSURE: 84 MMHG | BODY MASS INDEX: 33.05 KG/M2

## 2021-06-04 LAB
ALBUMIN SERPL-MCNC: 3.9 G/DL (ref 3.5–5.2)
ALP BLD-CCNC: 65 U/L (ref 35–104)
ALT SERPL-CCNC: 12 U/L (ref 5–33)
ANION GAP SERPL CALCULATED.3IONS-SCNC: 9 MMOL/L (ref 7–19)
AST SERPL-CCNC: 17 U/L (ref 5–32)
BASOPHILS ABSOLUTE: 0 K/UL (ref 0–0.2)
BASOPHILS RELATIVE PERCENT: 0.3 % (ref 0–1)
BILIRUB SERPL-MCNC: 0.7 MG/DL (ref 0.2–1.2)
BUN BLDV-MCNC: 9 MG/DL (ref 8–23)
CALCIUM SERPL-MCNC: 8.7 MG/DL (ref 8.8–10.2)
CHLORIDE BLD-SCNC: 108 MMOL/L (ref 98–111)
CHOLESTEROL, TOTAL: 214 MG/DL (ref 160–199)
CO2: 23 MMOL/L (ref 22–29)
CREAT SERPL-MCNC: 0.5 MG/DL (ref 0.5–0.9)
EKG P AXIS: 29 DEGREES
EKG P-R INTERVAL: 162 MS
EKG Q-T INTERVAL: 376 MS
EKG QRS DURATION: 86 MS
EKG QTC CALCULATION (BAZETT): 407 MS
EKG T AXIS: 52 DEGREES
EOSINOPHILS ABSOLUTE: 0.2 K/UL (ref 0–0.6)
EOSINOPHILS RELATIVE PERCENT: 2.8 % (ref 0–5)
GFR AFRICAN AMERICAN: >59
GFR NON-AFRICAN AMERICAN: >60
GLUCOSE BLD-MCNC: 105 MG/DL (ref 70–99)
GLUCOSE BLD-MCNC: 110 MG/DL (ref 74–109)
HBA1C MFR BLD: 5 % (ref 4–6)
HCT VFR BLD CALC: 42 % (ref 37–47)
HDLC SERPL-MCNC: 54 MG/DL (ref 65–121)
HEMOGLOBIN: 14.1 G/DL (ref 12–16)
IMMATURE GRANULOCYTES #: 0 K/UL
LDL CHOLESTEROL CALCULATED: 131 MG/DL
LYMPHOCYTES ABSOLUTE: 2.1 K/UL (ref 1.1–4.5)
LYMPHOCYTES RELATIVE PERCENT: 27.7 % (ref 20–40)
MAGNESIUM: 2 MG/DL (ref 1.6–2.4)
MCH RBC QN AUTO: 31.8 PG (ref 27–31)
MCHC RBC AUTO-ENTMCNC: 33.6 G/DL (ref 33–37)
MCV RBC AUTO: 94.8 FL (ref 81–99)
MONOCYTES ABSOLUTE: 0.5 K/UL (ref 0–0.9)
MONOCYTES RELATIVE PERCENT: 7.3 % (ref 0–10)
NEUTROPHILS ABSOLUTE: 4.6 K/UL (ref 1.5–7.5)
NEUTROPHILS RELATIVE PERCENT: 61.6 % (ref 50–65)
PDW BLD-RTO: 12.7 % (ref 11.5–14.5)
PERFORMED ON: ABNORMAL
PLATELET # BLD: 262 K/UL (ref 130–400)
PMV BLD AUTO: 11.6 FL (ref 9.4–12.3)
POTASSIUM SERPL-SCNC: 4.3 MMOL/L (ref 3.5–5)
RBC # BLD: 4.43 M/UL (ref 4.2–5.4)
SODIUM BLD-SCNC: 140 MMOL/L (ref 136–145)
TOTAL PROTEIN: 6 G/DL (ref 6.6–8.7)
TRIGL SERPL-MCNC: 144 MG/DL (ref 0–149)
WBC # BLD: 7.4 K/UL (ref 4.8–10.8)

## 2021-06-04 PROCEDURE — 83036 HEMOGLOBIN GLYCOSYLATED A1C: CPT

## 2021-06-04 PROCEDURE — 6360000002 HC RX W HCPCS: Performed by: INTERNAL MEDICINE

## 2021-06-04 PROCEDURE — 2580000003 HC RX 258: Performed by: INTERNAL MEDICINE

## 2021-06-04 PROCEDURE — 92610 EVALUATE SWALLOWING FUNCTION: CPT

## 2021-06-04 PROCEDURE — 97116 GAIT TRAINING THERAPY: CPT

## 2021-06-04 PROCEDURE — 80061 LIPID PANEL: CPT

## 2021-06-04 PROCEDURE — 36415 COLL VENOUS BLD VENIPUNCTURE: CPT

## 2021-06-04 PROCEDURE — 97165 OT EVAL LOW COMPLEX 30 MIN: CPT

## 2021-06-04 PROCEDURE — 97161 PT EVAL LOW COMPLEX 20 MIN: CPT

## 2021-06-04 PROCEDURE — 6370000000 HC RX 637 (ALT 250 FOR IP): Performed by: INTERNAL MEDICINE

## 2021-06-04 PROCEDURE — 93010 ELECTROCARDIOGRAM REPORT: CPT | Performed by: INTERNAL MEDICINE

## 2021-06-04 PROCEDURE — 97530 THERAPEUTIC ACTIVITIES: CPT

## 2021-06-04 PROCEDURE — 80053 COMPREHEN METABOLIC PANEL: CPT

## 2021-06-04 PROCEDURE — 93246 EXT ECG>7D<15D RECORDING: CPT

## 2021-06-04 PROCEDURE — 85025 COMPLETE CBC W/AUTO DIFF WBC: CPT

## 2021-06-04 PROCEDURE — 83735 ASSAY OF MAGNESIUM: CPT

## 2021-06-04 PROCEDURE — 92523 SPEECH SOUND LANG COMPREHEN: CPT

## 2021-06-04 PROCEDURE — 82947 ASSAY GLUCOSE BLOOD QUANT: CPT

## 2021-06-04 RX ORDER — ROSUVASTATIN CALCIUM 40 MG/1
40 TABLET, COATED ORAL NIGHTLY
Qty: 30 TABLET | Refills: 3 | Status: SHIPPED | OUTPATIENT
Start: 2021-06-04 | End: 2021-08-09 | Stop reason: SDUPTHER

## 2021-06-04 RX ORDER — CLOPIDOGREL BISULFATE 75 MG/1
75 TABLET ORAL DAILY
Status: DISCONTINUED | OUTPATIENT
Start: 2021-06-04 | End: 2021-06-04 | Stop reason: HOSPADM

## 2021-06-04 RX ORDER — CLOPIDOGREL BISULFATE 75 MG/1
75 TABLET ORAL DAILY
Qty: 30 TABLET | Refills: 3 | Status: SHIPPED | OUTPATIENT
Start: 2021-06-04 | End: 2021-08-12

## 2021-06-04 RX ADMIN — SODIUM CHLORIDE, PRESERVATIVE FREE 10 ML: 5 INJECTION INTRAVENOUS at 09:01

## 2021-06-04 RX ADMIN — Medication 2000 UNITS: at 08:53

## 2021-06-04 RX ADMIN — Medication 50 MCG: at 08:53

## 2021-06-04 RX ADMIN — LATANOPROST 1 DROP: 50 SOLUTION OPHTHALMIC at 08:55

## 2021-06-04 RX ADMIN — CLOPIDOGREL BISULFATE 75 MG: 75 TABLET, FILM COATED ORAL at 16:25

## 2021-06-04 RX ADMIN — ENOXAPARIN SODIUM 40 MG: 40 INJECTION SUBCUTANEOUS at 08:54

## 2021-06-04 RX ADMIN — ASPIRIN 81 MG: 81 TABLET, COATED ORAL at 08:53

## 2021-06-04 RX ADMIN — LEVOTHYROXINE SODIUM 75 MCG: 75 TABLET ORAL at 05:08

## 2021-06-04 ASSESSMENT — PAIN SCALES - GENERAL
PAINLEVEL_OUTOF10: 5

## 2021-06-04 ASSESSMENT — PAIN DESCRIPTION - LOCATION
LOCATION: BACK

## 2021-06-04 ASSESSMENT — ENCOUNTER SYMPTOMS
NAUSEA: 0
COUGH: 0
VOMITING: 0
PHOTOPHOBIA: 0
SHORTNESS OF BREATH: 0
BACK PAIN: 0

## 2021-06-04 ASSESSMENT — PAIN DESCRIPTION - PAIN TYPE
TYPE: CHRONIC PAIN
TYPE: CHRONIC PAIN

## 2021-06-04 ASSESSMENT — PAIN DESCRIPTION - ONSET: ONSET: ON-GOING

## 2021-06-04 ASSESSMENT — PAIN - FUNCTIONAL ASSESSMENT: PAIN_FUNCTIONAL_ASSESSMENT: ACTIVITIES ARE NOT PREVENTED

## 2021-06-04 ASSESSMENT — PAIN DESCRIPTION - PROGRESSION: CLINICAL_PROGRESSION: NOT CHANGED

## 2021-06-04 ASSESSMENT — PAIN DESCRIPTION - FREQUENCY: FREQUENCY: CONTINUOUS

## 2021-06-04 ASSESSMENT — PAIN DESCRIPTION - DESCRIPTORS
DESCRIPTORS: SORE;ACHING
DESCRIPTORS: ACHING;SORE

## 2021-06-04 NOTE — DISCHARGE INSTR - DIET

## 2021-06-04 NOTE — PROGRESS NOTES
Physical Therapy  Nacho Kappa  [id number]     06/04/21 1428   Subjective   Subjective Attempt, patient up in recliner and states she is comfortable sitting in the chair and does not want to go back to bed at this time. Patient visiting with spouse with all needs in reach.    Electronically signed by Jus Kent PTA on 6/4/2021 at 2:30 PM

## 2021-06-04 NOTE — PROGRESS NOTES
Occupational Therapy   Occupational Therapy Initial Assessment  Date: 2021   Patient Name: Glendora Meckel  MRN: [id number]     : 1945    Date of Service: 2021    Discharge Recommendations:          Assessment   Performance deficits / Impairments: Decreased ADL status; Decreased functional mobility ; Decreased coordination;Decreased fine motor control;Decreased endurance;Decreased balance;Decreased strength  Assessment: Will progress as tolerated. Good rehab candidate short term  Treatment Diagnosis: CVA with L side weakness  Prognosis: Good  Decision Making: Low Complexity  REQUIRES OT FOLLOW UP: Yes  Activity Tolerance  Activity Tolerance: Patient Tolerated treatment well           Patient Diagnosis(es): The encounter diagnosis was Acute ischemic stroke ([facility] [address] has a past medical history of Breast cancer ([facility] [address] GERD (gastroesophageal reflux disease), Hyperlipidemia, Thyroid disease, and Wears glasses. has a past surgical history that includes Breast surgery (Bilateral, ); Breast surgery (Right, 14); Breast surgery (Right, 14); daryl and bso (cervix removed); and eye surgery. Treatment Diagnosis: CVA with L side weakness      Restrictions  Restrictions/Precautions  Restrictions/Precautions: Fall Risk  Required Braces or Orthoses?: No    Subjective   General  Chart Reviewed: Yes  Patient assessed for rehabilitation services?: Yes  Family / Caregiver Present: No  Diagnosis: CVA with L side weakness  Patient Currently in Pain: Yes  Pain Assessment  Pain Assessment: 0-10  Pain Level: 5  Pain Type: Chronic pain  Pain Location: Back  Pain Descriptors: Aching; Sore  Pain Frequency: Continuous  Pain Onset: On-going  Clinical Progression: Not changed  Functional Pain Assessment: Activities are not prevented  Non-Pharmaceutical Pain Intervention(s): Rest  Response to Pain Intervention: Patient Satisfied  Vital Signs  Patient Currently in Pain: Yes  Social/Functional History  Social/Functional History  Lives With: Spouse  Type of Home: House  Home Layout: One level, Laundry in basement  Home Access: Stairs to enter without rails  Bathroom Shower/Tub: Tub/Shower unit  Bathroom Toilet: Standard  ADL Assistance: Independent  Homemaking Assistance: Independent  Ambulation Assistance: Independent  Transfer Assistance: Independent  Active : Yes  Occupation: Retired  Leisure & Hobbies: gardens       Objective   Vision Exceptions: Wears glasses at all times  Hearing: Within functional limits    Orientation  Overall Orientation Status: Within Normal Limits  Observation/Palpation  Posture: Good     ADL  Feeding: Independent  Grooming: Independent  UE Bathing: Supervision  LE Bathing: Minimal assistance  UE Dressing: Supervision  LE Dressing: Minimal assistance  Toileting: Contact guard assistance  Coordination  Movements Are Fluid And Coordinated: No  Coordination and Movement description: Left UE;Fine motor impairments;Gross motor impairments        Transfers  Stand Step Transfers: Contact guard assistance  Sit to stand: Contact guard assistance  Transfer Comments: RW     Cognition  Overall Cognitive Status: WNL        Sensation  Overall Sensation Status: Impaired (reports BLEs are normal, but LUE slightly decreased to light touch)        LUE AROM (degrees)  LUE AROM : WFL  RUE AROM (degrees)  RUE AROM : WFL  LUE Strength  Gross LUE Strength: Exceptions to [facility]  L Shoulder Flex: 4/5  L Shoulder ABduction: 4/5  L Elbow Flex: 4/5  L Elbow Ext: 4/5  RUE Strength  Gross RUE Strength: WFL                   Plan   Plan  Times per week: 3-5x/week  Times per day: Daily    G-Code     OutComes Score                                                  AM-PAC Score             Goals  Short term goals  Time Frame for Short term goals: 1 week  Short term goal 1: Supervision with toilet tfers  Short term goal 2: Supervision with seated LB dsg  Short term goal 3: Supervision with clothing mgmt in standing with no loss of balance       Therapy Time   Individual Concurrent Group Co-treatment   Time In           Time Out           Minutes                   Justyna Colin, OT

## 2021-06-04 NOTE — PROGRESS NOTES
Speech Language Pathology  Facility/Department: [facility] SURG SERVICES  Initial Speech/Language/Cognitive/Swallow Assessment    NAME: Ngozi Matos  : 1945   MRN: [id number]  ADMISSION DATE: 6/3/2021  ADMITTING DIAGNOSIS: has Malignant neoplasm of other specified sites of female breast; S/P lumpectomy, right breast 14; Personal history of breast cancer; PAF (paroxysmal atrial fibrillation) ([facility] [address] Non-rheumatic mitral regurgitation; Pure hypercholesterolemia; IFG (impaired fasting glucose); Acquired hypothyroidism; Exogenous obesity; B12 deficiency; Vitamin D deficiency; Acute CVA (cerebrovascular accident) ([facility] [address] Breast cancer ([facility] [address] and GERD (gastroesophageal reflux disease) on their problem list.    Date of Eval: 2021   Evaluating Therapist: Guadlupe Kayser, SLP    Assessment:  Completed assessment. SLP ranked functional intelligibility of speech for unfamiliar listeners at 100% in utterances with background noise present. Patient did exhibit slow processing with delayed, but appropriate, auditory comprehension and verbalizations noted. Also evaluated patient's swallowing function. Patient exhibited inconsistently sluggish laryngeal elevation for swallow airway protection. Even so, no outward S/S penetration/aspiration was observed with any regular solid consistency trial or thin H2O trial presented during assessment this date. At this time, recommend continuation regular solid consistency and thin liquids. Assist in meal set-up. Thank you for this consult. Goals:  Short-term Goals  Timeframe for Short-term Goals: 1x/day for 3 days  Goal 1: Patient will tolerate regular solid consistency and thin liquids with min S/S penetration/aspiration during PO intake. Goal 2: Monitor speech/language/cognitive functioning.      Subjective:  General  Chart Reviewed: Yes  Patient assessed for rehabilitation services?: Yes  Family / Caregiver Present: No    Objective:  Oral Motor:  (Structures and related functions were considered to be [facility]    Auditory Comprehension  Comprehension:  (Patient demonstrated ability to answer simple yes/no questions regarding immediate environment and current state of being at independent level and with adequate processing speed. Patient demonstrated ability to follow simple 1 and simple 2 step commands independently and with adequate processing speed. While mild delays were noted, patient demonstrated ability to answer yes/no questions of increased complexity and to follow complex 1 and complex 2 step commands at independent level.)    Expression  Primary Mode of Expression:  (Confrontation naming of items in room was considered to be [facility]. Structured responsive speech was considered to be mildly delayed. Responses in natural conversation were considered to be timely and appropriate.)    Motor Speech:  (SLP ranked functional intelligibility of speech for unfamiliar listeners at 100% in verbalizations with background noise present.)    Overall Orientation Status:  (Patient demonstrated ability to verbalize name, birthday, age, address, phone number, city, state, hospital, floor, month, YAYA, date, year, and situation at independent level.)    Attention:  (Patient demonstrated appropriate select and sustained attention during assessment.)    Memory:  (Patient demonstrated appropriate immediate memory with sequences of unrelated numbers/words set up to 5 items without repetitions provided. Patient demonstrated appropriate short-term memory with 10 minute delay+distractions present during assessment.)    Problem Solving:  (Patient verbalized appropriate solutions to situations that could occur during activities of daily living at independent level. Patient verbalized PCP and pharmacy independently. Patient verbalized conditions in which she takes medications at independent level.)    Additional Assessments:  Assessed patient's swallowing function.  Patient exhibited functional oral prep and transit of regular solid consistency trials, presented independently, with timing of oral transit primarily measuring 1-2 seconds in length and with min oral cavity residue noted post swallows; residue cleared from the mouth with additional dry swallows. Oral transit of thin H2O trials, presented independently via cup, primarily measured 1 second in length. Laryngeal elevation during swallow initiation was considered to be inconsistently sluggish for swallow airway protection. Even so, no outward S/S penetration/aspiration was observed with any regular solid consistency trial or thin H2O trial presented during evaluation this date. At this time, recommend continuation regular solid consistency and thin liquids. Assist in meal set-up. Will continue to follow.     Electronically signed by RADHA Beaver on 6/4/2021 at 12:23 PM

## 2021-06-04 NOTE — TELEPHONE ENCOUNTER
[facility] has referral from [address]   Will Dr Debbie Tobar follow pt  for [facility] orders ?

## 2021-06-04 NOTE — TELEPHONE ENCOUNTER
[facility] called needing to schedule patient for a 2 week hospital follow up for stroke with [facility]. [facility] is unable to accommodate within a timely manner. Please call patient to schedule.

## 2021-06-04 NOTE — PROGRESS NOTES
Physical Therapy    Facility/Department: [facility] SURG SERVICES  Initial Assessment    NAME: Carolee Ruiz  : 1945  MRN: [id number]    Date of Service: 2021    Discharge Recommendations:  Continue to assess pending progress, 24 hour supervision or assist, Patient would benefit from continued therapy after discharge        Assessment   Body structures, Functions, Activity limitations: Decreased functional mobility ; Decreased ROM; Decreased strength;Decreased sensation;Decreased balance;Decreased coordination  Assessment: Pt. tolerated amb. and mobility well. Pt. a fall risk due to weakness and poor coordination in L U/LE. During amb, pt has difficulty advancing LLE and needs v. cues to keep RW close. Anticipate pt will benefit from cont. PT to decrease impairments. Pt. cooperative and motivated to get better. Treatment Diagnosis: impaired gait and mobility  Prognosis: Good  Decision Making: Low Complexity  PT Education: Goals;PT Role;Plan of Care;General Safety;Gait Training;Transfer Training;Functional Mobility Training; Adaptive Device Training  Barriers to Learning: none noted  REQUIRES PT FOLLOW UP: Yes  Activity Tolerance  Activity Tolerance: Patient Tolerated treatment well       Patient Diagnosis(es): The encounter diagnosis was Acute ischemic stroke ([facility] [address] has a past medical history of Breast cancer ([facility] [address] GERD (gastroesophageal reflux disease), Hyperlipidemia, Thyroid disease, and Wears glasses. has a past surgical history that includes Breast surgery (Bilateral, ); Breast surgery (Right, 14); Breast surgery (Right, 14); daryl and bso (cervix removed); and eye surgery.     Restrictions  Restrictions/Precautions  Restrictions/Precautions: Fall Risk  Required Braces or Orthoses?: No  Vision/Hearing  Vision: Impaired  Vision Exceptions: Wears glasses at all times  Hearing: Within functional limits     Subjective  General  Chart Reviewed: Yes  Patient assessed for rehabilitation services?: Yes  Response To Previous Treatment: Not applicable  Family / Caregiver Present: No  Referring Practitioner: Royer Sanchez MD  Referral Date : 06/03/21  Diagnosis: Left thalamocapsular infarct  Follows Commands: Within Functional Limits  General Comment  Comments: RN, Sanjeev Bender PT. Subjective  Subjective: States her hand is floppy; was able to amb. to the bathroom with help; reports she isn't back to normal yet. Pain Screening  Patient Currently in Pain: Yes  Pain Assessment  Pain Assessment: 0-10  Pain Level: 5  Pain Type: Chronic pain  Pain Location: Back  Pain Descriptors: Sore;Aching  Non-Pharmaceutical Pain Intervention(s): Repositioned; Ambulation/Increased Activity  Vital Signs  Patient Currently in Pain: Yes  Pre Treatment Pain Screening  Intervention List: Patient able to continue with treatment  Comments / Details: reports that the bed was hurting her back, but she feels much better now that she's up.     Orientation  Orientation  Overall Orientation Status: Within Normal Limits  Social/Functional History  Social/Functional History  Lives With: Spouse  Type of Home: House  Home Layout: One level, Laundry in basement  Home Access: Stairs to enter without rails  Bathroom Shower/Tub: Tub/Shower unit  Bathroom Toilet: Standard  ADL Assistance: Independent  Homemaking Assistance: Independent  Ambulation Assistance: Independent  Transfer Assistance: Independent  Active : Yes  Occupation: Retired  Leisure & Hobbies: gardens  Cognition   Cognition  Overall Cognitive Status: WNL    Objective     Observation/Palpation  Posture: Good    AROM RLE (degrees)  RLE AROM: WFL  AROM LLE (degrees)  LLE AROM : WFL  Strength RLE  Strength RLE: WFL  Comment: 5/5  Strength LLE  Strength LLE: Exception  Comment: grossly 4-/5     Sensation  Overall Sensation Status: Impaired (reports BLEs are normal, but LUE slightly decreased to light touch)  Bed mobility  Supine to Sit: Unable to assess  Sit to Supine: Unable to assess  Comment: up in chair and does not want to get back to bed  Transfers  Sit to Stand: Minimal Assistance  Stand to sit: Minimal Assistance  Comment: used RW to stand, but needed v. cues for hand placement. When sitting back down, L hand falls off RW due to poor coordination. Ambulation  Ambulation?: Yes  WB Status: no restrictions  Ambulation 1  Surface: level tile  Device: Rolling Walker  Assistance: Minimal assistance;2 Person assistance  Quality of Gait: unsteady, decreased toe clearance LLE  Gait Deviations: Slow Mónica;Decreased step length;Decreased step height  Distance: 30'  Comments: IV, v. cues to keep RW close and take larger steps with LLE. Pt. having difficulty advancing LLE. Balance  Posture: Good  Sitting - Static: Good;+  Sitting - Dynamic: Good;-  Standing - Static: Fair;-  Standing - Dynamic: Poor;+        Plan   Plan  Times per week: 3-7  Times per day: Daily  Plan weeks: 2  Current Treatment Recommendations: Strengthening, ROM, Balance Training, Functional Mobility Training, Transfer Training, Gait Training, Safety Education & Training, Positioning, Equipment Evaluation, Education, & procurement, Patient/Caregiver Education & Training, Neuromuscular Re-education  Plan Comment: cont. PT per POC.   Safety Devices  Type of devices: Gait belt, Patient at risk for falls, Nurse notified, Call light within reach, Left in chair    G-Code       OutComes Score                                                  AM-PAC Score             Goals  Short term goals  Time Frame for Short term goals: 2 wks  Short term goal 1: supine to sit indep  Short term goal 2: sit to stand indep  Short term goal 3: amb. 200' with RW SBA  Patient Goals   Patient goals : get stronger and go home       Therapy Time   Individual Concurrent Group Co-treatment   Time In           Time Out           Minutes                   Amy Villa PT     Electronically signed by Amy Villa PT on 6/4/2021 at 9:47

## 2021-06-04 NOTE — DISCHARGE SUMMARY
Discharge Summary      Kristina Tucker  :  1945  MRN:  [id number]    Admit date:  6/3/2021  Discharge date:      Admitting Physician:  Kerrin Bernheim, MD    Advance Directive: Full Code    Consults: neurology    Primary Care Physician:  Mavis Griffin MD    Discharge Diagnoses:  Principal Problem:    Acute CVA (cerebrovascular accident) [facility])  Active Problems:    S/P lumpectomy, right breast 14    Personal history of breast cancer    PAF (paroxysmal atrial fibrillation) ([facility] [address]    Non-rheumatic mitral regurgitation    Pure hypercholesterolemia    Acquired hypothyroidism    Exogenous obesity    B12 deficiency    Vitamin D deficiency    Breast cancer ([facility] [address]    GERD (gastroesophageal reflux disease)  Resolved Problems:    * No resolved hospital problems. *      Significant Diagnostic Studies:   ECHO Complete 2D W Doppler W Color    Result Date: 6/3/2021  Transthoracic Echocardiography Report (TTE)  Demographics   Patient Name   Blu Castro  Date of Study           2021   MRN            [id number]            Gender                  Female   Date of Birth  1945        Room Number             M[facility]-0534   Age            66 year(s)   Height:        65 inches         Referring Physician     halina reilly   Weight:        198 pounds        Sonographer             Esthela Molina [facility]   BSA:           1.97 m^2          Interpreting Physician  Nick Vital MD   BMI:           32.95 kg/m^2  Procedure Type of Study   TTE procedure:ECHO NO CONTRAST WITH DOP/COLR. Study Location: Echo Lab Technical Quality: Adequate visualization Patient Status: Inpatient Contrast Medium: Bubble Study. Indications:CVA. Conclusions   Summary  LV is normal in size with hyperdynamic LV systolic function. LV ejection  fraction estimated at 70%. Mild concentric LVH. Grade 1 diastolic dysfunction. RV is normal in size with normal systolic function. Mild to moderate left atrial enlargement. Normal right atrial size.   Aortic valve appears compatible with age. Mild decreased attenuation in the periventricular and subcortical white matter observed suggesting mild chronic ischemic changes. Paranasal sinuses imaged part are clear. Hyperostosis frontalis interna observed. There is no skull fracture acute calvarial abnormality. These findings were discussed with Dr. Rafael Todd, emergency room physician, at the time of dictation, 11:15 AM, 6/3/2021. Impression: 1. No CT evidence of an acute intracranial process. Signed by Dr Anais Turner on 6/3/2021 12:15 PM    XR CHEST PORTABLE    Result Date: 6/3/2021  EXAM: XR CHEST PORTABLE -- 6/3/2021 11:09 AM HISTORY: 66 years, Female, CVA COMPARISON: 9/6/2014 TECHNIQUE:  1 images. Frontal view of the chest. FINDINGS:  No pneumothorax, pleural effusion or focal consolidation. Calcified aortic atherosclerosis. Cardiac mediastinal silhouette within normal limits. Surgical clips at the right chest wall or breast.    1. No acute cardiopulmonary finding. Signed by Dr Henri Randle on 6/3/2021 12:51 PM    CTA NECK W CONTRAST    Result Date: 6/3/2021  EXAMINATION:  CTA NECK W CONTRAST  6/3/2021 12:25 PM HISTORY: Stroke symptoms COMPARISON: Head CT performed today TECHNIQUE: CT angiography the neck was performed. Radiation dose equals  mGy cm. AUTOMATED EXPOSURE CONTROL dose reduction technique was implemented. Thin section axial images were obtained with intravenous contrast. Multi projection reconstruction images were generated. Multi projection maximum intensity projection angiographic images were obtained. ICA stenosis calculated using NASCET criteria. FINDINGS: There are aortic arch calcifications. There are calcifications involving the brachycephalic vessel origins arising from the arch without stenosis. There is ectasia of the left common carotid artery proximally. The left common carotid arteries widely patent. The carotid bifurcation is widely patent.  There is posterior medial calcified atheromatous plaque involving the proximal left ICA just distal to the bifurcation without stenosis. The left internal carotid arteries widely patent without steno-occlusive changes. The left external carotid arteries widely patent. There is ectasia of the right common carotid artery proximally. The carotid bifurcation is widely patent. There is posterior calcified atheromatous plaque involving the origin of the right internal carotid artery without stenosis. The right ICA distally is widely patent without steno-occlusive changes with mild ectasia. The right external carotid artery is patent and uncompromised. The vertebral arteries are patent without stenosis. The left vertebral artery is mildly dominant. The upper lung fields are grossly clear. Degenerative changes of the cervical spine with multilevel disc degeneration. 1. Mild calcified atheromatous plaque involving the internal carotid artery origins bilaterally without stenosis. 2. Otherwise negative CT angiography the neck without hemodynamically significant stenosis. Signed by Dr Magalis Muñoz on 6/3/2021 12:31 PM    CTA HEAD W CONTRAST    Result Date: 6/3/2021  Examination. CTA HEAD W CONTRAST 6/3/2021 11:17 AM History: Left-sided weakness. DLP: 3 1 3 mGycm. CT angiography of the head is performed after intravenous contrast enhancement. The images are acquired in axial plane with subsequent 2-D reconstruction coronal and sagittal planes and 3-D maximum intensity projection reconstruction. There is no previous similar study for comparison. The correlation made with CT scan of the head obtained earlier today. Normal size internal carotid arteries are seen at the base of skull and in the carotid canal bilaterally. Atheromatous plaques are seen in the internal carotid arteries bilaterally in the cavernous sinus. No focal stenosis. Normal size suprasellar internal carotid arteries bilaterally is seen.  Both internal carotid arteries divide into normal-appearing anterior and middle cerebral arteries. There is subsequent normal insular and opercular branches bilaterally. No focal stenosis or aneurysmal dilatation Normal size vertebral arteries enter the foramen magnum. Both join to make a normal-sized basilar artery which subsequently divides into normal-appearing posterior cerebral arteries bilaterally. No areas of focal stenosis or aneurysmal dilatation. A normal CT angiography of the head. Signed by Dr Gregory Marc on 6/3/2021 12:34 PM    MRI BRAIN WO CONTRAST    Result Date: 6/3/2021  EXAM: MRI BRAIN WO CONTRAST -- 6/3/2021 12:42 PM HISTORY: 66 years, Female, CVA, left-sided weakness x1 day COMPARISON: 6/3/2021 CT head TECHNIQUE: Routine pulse sequences of the brain were obtained without IV contrast. FINDINGS: There is a oval 13 x 8 mm area of restricted diffusion consistent with acute infarct in the right frontal periventricular white matter. Focus of associated susceptibility artifact suggesting microhemorrhage. Mild T2/FLAIR hyperintensity in the periventricular and subcortical white matter suggesting mild chronic microvascular changes. Mild proportional prominence of the ventricles, sulci and basilar cisterns suggesting volume loss. Partially empty sella. Remaining midline structures appear within normal limits. Brainstem and posterior fossa are within normal limits. Visualized flow voids within normal limits. Thinning of the ocular lenses may be postoperative or age-related. Paranasal sinuses and mastoid air cells appear within normal limits. Visualized overlying soft tissues within normal limits. 1. Right frontal periventricular white matter within oval 13 x 8 mm area of acute infarct and associated punctate microhemorrhage. Results communicated by telephone to Dr. Clement Dupree at 1:29 PM 6/3/2021.  Signed by Dr Shara Reyna on 6/3/2021 2:32 PM      Pertinent Labs:   CBC:   Recent Labs     06/03/21  1055 06/04/21  0259   WBC 6.7 7.4   HGB appreciated   Abdomen: BS+, soft, NT, ND  Extremities: no edema, pulses+  Skin: warm  Neurologic: Alert, some improvement in left sided strength  Psychiatric:  Calm, pleasant, mood appropriate    Discharge Medications:       Kunal Acuña   Home Medication Instructions JULIO:[id number]    Printed on:06/04/21 2554   Medication Information                      aspirin 81 MG tablet  Take 81 mg by mouth daily. clopidogrel (PLAVIX) 75 MG tablet  Take 1 tablet by mouth daily             latanoprost (XALATAN) 0.005 % ophthalmic solution  Place 1 drop into both eyes nightly              levothyroxine (SYNTHROID) 75 MCG tablet  TAKE ONE TABLET BY MOUTH ONCE DAILY             rosuvastatin (CRESTOR) 40 MG tablet  Take 1 tablet by mouth nightly             vitamin B-12 (CYANOCOBALAMIN) 100 MCG tablet  Take 50 mcg by mouth daily             Vitamin D (CHOLECALCIFEROL) 1000 UNITS CAPS capsule  Take 2,000 Units by mouth daily                  Discharge Instructions: Follow up with Pastor Dean MD in 7 days. Take medications as directed. Resume activity as tolerated. Diet: ADULT DIET; Regular; 4 carb choices (60 gm/meal); Low Sodium (2 gm)     Disposition: Patient is medically stable and will be discharged Home. Time spent on discharge 35 minutes.     Signed:  Polly Larsen MD 6/4/2021 2:56 PM

## 2021-06-04 NOTE — PROGRESS NOTES
[facility] Hospitalists        Hospitalist Progress Note  6/4/2021 1:13 PM  Subjective:   Admit Date: 6/3/2021  PCP: Mavis Grififn MD    Chief Complaint: Left-sided weakness    Subjective: Patient seen and examined at bedside with  present. Sitting in bedside chair. Strength somewhat improved today. Denies chest pain or shortness of breath. Excited about the possibility of going home today. Cumulative Hospital History: 66 y.o. obese female with a past medical history of paroxysmal atrial fibrillation status post DCCV 9/2014 not currently on anticoagulation, breast cancer status post lumpectomy 1/2014, hypothyroidism, B12 deficiency, vitamin D deficiency who presented to the ED after she awoke 6/3/2021 with left-sided weakness with difficulty ambulating and moving around. MRI showing acute CVA with punctate microhemorrhages. Neurology consulted from the emergency department. ROS: 14 point review of systems is negative except as specifically addressed above. ADULT DIET; Regular; 4 carb choices (60 gm/meal);  Low Sodium (2 gm)    Intake/Output Summary (Last 24 hours) at 6/4/2021 1313  Last data filed at 6/4/2021 0901  Gross per 24 hour   Intake 250 ml   Output    Net 250 ml     Medications:   sodium chloride       Current Facility-Administered Medications   Medication Dose Route Frequency Provider Last Rate Last Admin    sodium chloride flush 0.9 % injection 5-40 mL  5-40 mL Intravenous 2 times per day Kerrin Bernheim, MD   10 mL at 06/04/21 0901    sodium chloride flush 0.9 % injection 5-40 mL  5-40 mL Intravenous PRN Kerrin Bernheim, MD        0.9 % sodium chloride infusion  25 mL Intravenous PRN Kerrin Bernheim, MD        ondansetron (ZOFRAN-ODT) disintegrating tablet 4 mg  4 mg Oral Q8H PRN Kerrin Bernheim, MD        Or    ondansetron [facility]) injection 4 mg  4 mg Intravenous Q6H PRN Kerrin Bernheim, MD        polyethylene glycol [facility]) packet 17 g  17 g Oral Daily PRN Kerrin Bernheim, MD       [facility] enoxaparin (LOVENOX) injection 40 mg  40 mg Subcutaneous Daily Sailaja Gomez, MD   40 mg at 06/04/21 0854    aspirin EC tablet 81 mg  81 mg Oral Daily Sailaja Gomez, MD   81 mg at 06/04/21 9530    Or    aspirin suppository 300 mg  300 mg Rectal Daily Sailaja Gomez MD        rosuvastatin (CRESTOR) tablet 40 mg  40 mg Oral Nightly Sailaja Gomez, MD   40 mg at 06/03/21 2023    labetalol (NORMODYNE;TRANDATE) injection 10 mg  10 mg Intravenous Q10 Min PRN Sailaja Gomez MD        latanoprost (XALATAN) 0.005 % ophthalmic solution 1 drop  1 drop Both Eyes Daily Sailaja Gomez, MD   1 drop at 06/04/21 0855    levothyroxine (SYNTHROID) tablet 75 mcg  75 mcg Oral Daily Sailaja Gomez, MD   75 mcg at 06/04/21 7908    vitamin B-12 (CYANOCOBALAMIN) tablet 50 mcg  50 mcg Oral Daily Sailaja Gomez, MD   50 mcg at 06/04/21 0584    Vitamin D (CHOLECALCIFEROL) tablet 2,000 Units  2,000 Units Oral Daily Sailaja Never, MD   2,000 Units at 06/04/21 0853        Labs:     Recent Labs     06/03/21  1055 06/04/21  0259   WBC 6.7 7.4   RBC 4.83 4.43   HGB 15.2 14.1   HCT 45.7 42.0   MCV 94.6 94.8   MCH 31.5* 31.8*   MCHC 33.3 33.6    262     Recent Labs     06/03/21  1054 06/03/21  1055 06/04/21  0259   NA  --  141 140   K  --  4.2 4.3   ANIONGAP  --  10 9   CL  --  106 108   CO2  --  25 23   BUN  --  10 9   CREATININE  --  0.5 0.5   GLUCOSE 116 109 110*   CALCIUM  --  9.9 8.7*     Recent Labs     06/04/21  0259   MG 2.0     Recent Labs     06/03/21  1055 06/04/21  0259   AST 19 17   ALT <5* 12   BILITOT 0.7 0.7   ALKPHOS 74 65     ABGs:No results for input(s): PH, PO2, PCO2, HCO3, BE, O2SAT in the last 72 hours. Troponin T:   Recent Labs     06/03/21  1055   TROPONINI <0.01     INR:   Recent Labs     06/03/21  1055   INR 0.97     Lactic Acid: No results for input(s): LACTA in the last 72 hours.     Objective:   Vitals: /84   Pulse 65   Temp 97 °F (36.1 °C) (Temporal)   Resp 16   Ht 5' 5\" (1.651 m)   Wt 198 lb 6 oz (90 kg) SpO2 94%   BMI 33.01 kg/m²   24HR INTAKE/OUTPUT:      Intake/Output Summary (Last 24 hours) at 6/4/2021 1313  Last data filed at 6/4/2021 0901  Gross per 24 hour   Intake 250 ml   Output    Net 250 ml     General appearance: Alert  Head: NC/AT  Eyes: conjunctivae/corneas clear   Ears: normal external ears  Neck: supple  Lungs: BLAE, no W/R/R appreciated   Heart: RRR, n murmurs appreciated   Abdomen: BS+, soft, NT, ND  Extremities: no edema, pulses+  Skin: warm  Neurologic: Alert, some improvement in left sided strength  Psychiatric:  Calm, pleasant, mood appropriate    Assessment and Plan: Active Problems:    S/P lumpectomy, right breast 1/7/14    Personal history of breast cancer    PAF (paroxysmal atrial fibrillation) (HCC)    Non-rheumatic mitral regurgitation    Pure hypercholesterolemia    Acquired hypothyroidism    Exogenous obesity    B12 deficiency    Vitamin D deficiency    Acute CVA (cerebrovascular accident) ([facility] [address]    Breast cancer ([facility] [address]    GERD (gastroesophageal reflux disease)  Resolved Problems:    * No resolved hospital problems. *    CVA: Aspirin/Statin. Permissive HTN. TTE grossly unremarkable. PT/OT/SLP. Fall precautions. Monitor on telemetry. Neurology on board. Likely addition of plavix and ZIO on discharge.     PAF: Currently in NSR. Telemetry.     Hypothyroidism: Synthroid.     Vitamin D/B12 deficiency: Continue replacement.     Supportive management.     Advance Directive: Full Code    DVT prophylaxis: Lovenox    Discharge planning: TBD - home today if OK with neurology      Signed:  Thuy Singer MD 6/4/2021 1:13 PM  Rounding Hospitalist

## 2021-06-07 ENCOUNTER — TELEPHONE (OUTPATIENT)
Dept: INTERNAL MEDICINE CLINIC | Age: 76
End: 2021-06-07

## 2021-06-07 ENCOUNTER — TELEPHONE (OUTPATIENT)
Dept: INTERNAL MEDICINE | Age: 76
End: 2021-06-07

## 2021-06-07 ENCOUNTER — CARE COORDINATION (OUTPATIENT)
Dept: CASE MANAGEMENT | Age: 76
End: 2021-06-07

## 2021-06-07 DIAGNOSIS — I63.9 ACUTE CVA (CEREBROVASCULAR ACCIDENT) (HCC): Primary | ICD-10-CM

## 2021-06-07 PROCEDURE — 1111F DSCHRG MED/CURRENT MED MERGE: CPT | Performed by: INTERNAL MEDICINE

## 2021-06-07 NOTE — TELEPHONE ENCOUNTER
Scheduled appointment with PCP within 7-14 days    Follow Up  Future Appointments   Date Time Provider Srikanth Daysi   6/10/2021  3:00 PM Cosimo , MD [facility]   7/6/2021 10:15 AM MD [facility] Patricio       Zora Ley[place], [place]

## 2021-06-07 NOTE — CARE COORDINATION
no issues with swallowing. She says she is awaiting home care arrival. She has MHC for [facility], PT, OT, and bath aide. She has her HFU with Dr. Ryan Ochoa on 6/10 and Neurology to call with follow up for 2 weeks. She has her medications and is taking them, did review, 1111f order added. She says appetite is good and no issues with bowels or bladder. She is wearing her Zio patch, and is aware when to take off and send back for reading. She has not had the Covid vaccine and is not planning on taking it. She lists her  as PDM, and has spoken with [facility] regarding AD,but does not have one currently. Discussed CTN calls and follow up and she is accepting. Encouraged to call with prn needs. Will follow up at a later time.    Future Appointments   Date Time Provider Srikanth Tavarez   6/10/2021  3:00 PM Annette Acevedo MD [facility]   7/6/2021 10:15 AM Annette Acevedo MD [facility]       Kiera Parra RN

## 2021-06-08 NOTE — TELEPHONE ENCOUNTER
Called and spoke with patient to let her know when her appointment with Dr. Gurwinder Martell has been scheduled.  Patient is aware of the appointment time/date and is on the wait list.

## 2021-06-09 ENCOUNTER — TELEPHONE (OUTPATIENT)
Dept: INTERNAL MEDICINE CLINIC | Age: 76
End: 2021-06-09

## 2021-06-10 ENCOUNTER — TELEPHONE (OUTPATIENT)
Dept: INTERNAL MEDICINE CLINIC | Age: 76
End: 2021-06-10

## 2021-06-10 ENCOUNTER — OFFICE VISIT (OUTPATIENT)
Dept: INTERNAL MEDICINE | Age: 76
End: 2021-06-10
Payer: MEDICARE

## 2021-06-10 VITALS
OXYGEN SATURATION: 96 % | SYSTOLIC BLOOD PRESSURE: 120 MMHG | HEIGHT: 65 IN | BODY MASS INDEX: 31.99 KG/M2 | DIASTOLIC BLOOD PRESSURE: 62 MMHG | HEART RATE: 74 BPM | WEIGHT: 192 LBS

## 2021-06-10 DIAGNOSIS — I63.9 ACUTE CVA (CEREBROVASCULAR ACCIDENT) (HCC): Primary | ICD-10-CM

## 2021-06-10 DIAGNOSIS — E03.9 ACQUIRED HYPOTHYROIDISM: ICD-10-CM

## 2021-06-10 DIAGNOSIS — E78.00 PURE HYPERCHOLESTEROLEMIA: ICD-10-CM

## 2021-06-10 DIAGNOSIS — I48.0 PAF (PAROXYSMAL ATRIAL FIBRILLATION) (HCC): ICD-10-CM

## 2021-06-10 PROCEDURE — 99214 OFFICE O/P EST MOD 30 MIN: CPT | Performed by: INTERNAL MEDICINE

## 2021-06-10 SDOH — ECONOMIC STABILITY: FOOD INSECURITY: WITHIN THE PAST 12 MONTHS, YOU WORRIED THAT YOUR FOOD WOULD RUN OUT BEFORE YOU GOT MONEY TO BUY MORE.: PATIENT DECLINED

## 2021-06-10 SDOH — ECONOMIC STABILITY: FOOD INSECURITY: WITHIN THE PAST 12 MONTHS, THE FOOD YOU BOUGHT JUST DIDN'T LAST AND YOU DIDN'T HAVE MONEY TO GET MORE.: PATIENT DECLINED

## 2021-06-10 ASSESSMENT — ENCOUNTER SYMPTOMS
COUGH: 0
SORE THROAT: 0
WHEEZING: 0
ABDOMINAL PAIN: 0
CONSTIPATION: 0
CHEST TIGHTNESS: 0

## 2021-06-10 ASSESSMENT — SOCIAL DETERMINANTS OF HEALTH (SDOH): HOW HARD IS IT FOR YOU TO PAY FOR THE VERY BASICS LIKE FOOD, HOUSING, MEDICAL CARE, AND HEATING?: PATIENT DECLINED

## 2021-06-10 NOTE — TELEPHONE ENCOUNTER
[address] [id number] OT eval completed and per OT Pt with decreased L UE strength and coordination for functional use  Recommending :  OT 2x/week x 1 week, beginning 6/13/2021, then transition to outpatient therapies  Please advise if approved     Sw contact pt tp schedule eval and pt denied needs for SW to visit  - no eval done

## 2021-06-10 NOTE — PROGRESS NOTES
Hospital Follow-up Visit      Donna Bowden   YOB: 1945    Date of Visit:  6/10/2021    Vitals:    06/10/21 1513   BP: 120/62   Pulse: 74   SpO2: 96%   Weight: 192 lb (87.1 kg)   Height: 5' 5\" (1.651 m)     Body mass index is 31.95 kg/m². Wt Readings from Last 3 Encounters:   06/10/21 192 lb (87.1 kg)   21 198 lb 6 oz (90 kg)   21 202 lb (91.6 kg)     BP Readings from Last 3 Encounters:   06/10/21 120/62   21 139/84   21 128/78       No Known Allergies  No outpatient medications have been marked as taking for the 6/10/21 encounter (Office Visit) with Ana Clements MD.         CC:  Patient presents for hospital discharge follow-upafter admission  (following highlighted text has been copied from this specialist note)    Discharge Summary        Donna Bowden                 :  1945                     MRN:  [id number]     Admit date:  6/3/2021                        Discharge date:       Admitting Physician:  Jamie Rodriguez MD     Advance Directive: Full Code     Consults: neurology     Primary Care Physician:  Ana Clements MD     Discharge Diagnoses:  Principal Problem:    Acute CVA (cerebrovascular accident) [facility])  Active Problems:    S/P lumpectomy, right breast 14    Personal history of breast cancer    PAF (paroxysmal atrial fibrillation) ([facility] [address]    Non-rheumatic mitral regurgitation    Pure hypercholesterolemia    Acquired hypothyroidism    Exogenous obesity    B12 deficiency    Vitamin D deficiency    [facility]    GERD (gastroesophageal reflux disease)  Resolved Problems:    * No resolved hospital problems. *        Medications are reconciled and reviewed. Inpatient course: Discharge summary reviewed- see chart. Current status: stable  Test result review  Echocardiogram  Summary   LV is normal in size with hyperdynamic LV systolic function. LV ejection   fraction estimated at 70%. Mild concentric LVH. Grade 1 diastolic dysfunction. RV is normal in size with normal systolic function. Mild to moderate left atrial enlargement. Normal right atrial size. Aortic valve appears trileaflet with normal leaflet mobility. No stenosis   or significant regurgitation. Mitral valve appears structurally normal with normal leaflet mobility. No   stenosis or significant regurgitation. Moderate tricuspid regurgitation. RVSP estimated at 30 mmHg. Coronary sinus appears dilated on some views. Bubble study fails to demonstrate any significant right-to-left shunting. No significant pericardial effusion. CTA neck  1. Mild calcified atheromatous plaque involving the internal carotid artery origins bilaterally without stenosis. 2. Otherwise negative CT angiography the neck without hemodynamically significant stenosis. Signed by Dr Stew Knight on 6/3/2021 12:31 PM    MRI brain  Impression   1. Right frontal periventricular white matter within oval 13 x 8 mm   area of acute infarct and associated punctate microhemorrhage. Results communicated by telephone to Dr. Dawood Anderson at 1:29 PM   6/3/2021. Signed by Dr Radha Delarosa on 6/3/2021 2:32 PM         Impression   A normal CT angiography of the head. Signed by Dr Omaira Sims on 6/3/2021 12:34 PM       Review of Systems   Constitutional: Positive for fatigue. Negative for chills and fever. HENT: Negative for congestion, ear pain, nosebleeds, postnasal drip and sore throat. Respiratory: Negative for cough, chest tightness and wheezing. Cardiovascular: Negative for chest pain, palpitations and leg swelling. Gastrointestinal: Negative for abdominal pain and constipation. Genitourinary: Negative for dysuria and urgency. Musculoskeletal: Positive for arthralgias. Skin: Negative for rash. Neurological: Positive for weakness and numbness. Negative for dizziness and headaches. Psychiatric/Behavioral: Negative.          Physical Exam  Constitutional:       Appearance: She is well-developed. HENT:      Right Ear: External ear normal.      Left Ear: External ear normal.      Mouth/Throat:      Pharynx: No oropharyngeal exudate. Eyes:      Conjunctiva/sclera: Conjunctivae normal.      Pupils: Pupils are equal, round, and reactive to light. Neck:      Thyroid: No thyromegaly. Vascular: No JVD. Cardiovascular:      Rate and Rhythm: Normal rate. Heart sounds: Normal heart sounds. No murmur heard. Pulmonary:      Effort: No respiratory distress. Breath sounds: Normal breath sounds. No wheezing or rales. Chest:      Chest wall: No tenderness. Abdominal:      General: Bowel sounds are normal.      Palpations: Abdomen is soft. Musculoskeletal:      Cervical back: Neck supple. Lymphadenopathy:      Cervical: No cervical adenopathy. Skin:     General: Skin is warm. Findings: No rash. Neurological:      Mental Status: She is oriented to person, place, and time. Motor: Weakness present.       Comments: Left upper extremity 4+ out of 5 weakness    Mild left lower extremity limp when walking       Lab Results   Component Value Date     06/04/2021    K 4.3 06/04/2021    K 4.2 06/03/2021     06/04/2021    CO2 23 06/04/2021    BUN 9 06/04/2021    CREATININE 0.5 06/04/2021    GLUCOSE 110 06/04/2021    CALCIUM 8.7 06/04/2021     Lab Results   Component Value Date    WBC 7.4 06/04/2021    WBC 6.7 06/03/2021    WBC 6.4 01/04/2021    HGB 14.1 06/04/2021    HGB 15.2 06/03/2021    HGB 14.9 01/04/2021    HCT 42.0 06/04/2021    HCT 45.7 06/03/2021    HCT 45.4 01/04/2021    MCV 94.8 06/04/2021    MCV 94.6 06/03/2021    MCV 96.2 01/04/2021     06/04/2021     06/03/2021     01/04/2021     Lab Results   Component Value Date    CHOL 214 06/04/2021    TRIG 144 06/04/2021    HDL 54 06/04/2021    LDLDIRECT 82 09/06/2014       Assessment/Plan:    Left thalamocapsular infarct/post recent admission  Hypertension  Hyperlipidemia  Atrial fibrillation episode many years ago when pt was dihydrated    In past refused statin  Now on crestor 40 and co body aches  Trial 10 mg one week- then increase to 20 mg-asked patient to call me with symptoms in 1 week    Physical therapy currently at home/patient feels home physical therapy is not very useful for her. She would prefer in person physical therapy  She will discuss this with her current home physical therapy to arrange outpatient physical therapy    Blood pressure  Is well controlled      Hypothyroidism  Thyroid levels were not checked during recent admission  Most recently January 2021 thyroid levels TSH and T4 were normal/has been on same dose for several years  This time continue Synthroid 75 mcg daily            Diagnostic test results reviewed    Patient risk of morbidity and mortality: low    This is high complexity TCM visit  Patient was called within 2 business days of discharge    Family able to provide care to patient at home  other needs including PT, OT needs detected at this time-home physical therapy, Occupational Therapy  additional provider follow-ups needed at this [address] [place] [id number] neurology      No follow-ups on file.

## 2021-06-11 ENCOUNTER — TELEPHONE (OUTPATIENT)
Dept: INTERNAL MEDICINE CLINIC | Age: 76
End: 2021-06-11

## 2021-06-11 NOTE — TELEPHONE ENCOUNTER
[address] [address] PT reporting that pt will complete HH PT and OT services next week and wll be appropriate for transition to outpt PT services at that time.       Pt is requesting orders for out pt PT orders to be sent to [facility] in [facility] with [facility] there to begin on Friday of next week June 18, 2021   The [facility] PTA called [facility] and they stated [facility] [employer] does have an opening for that date   [facility] needs out pt PT referral sent to them at phone - [phone]  - fax [phone]

## 2021-06-14 ENCOUNTER — TELEPHONE (OUTPATIENT)
Dept: CASE MANAGEMENT | Age: 76
End: 2021-06-14

## 2021-06-14 ENCOUNTER — CARE COORDINATION (OUTPATIENT)
Dept: CASE MANAGEMENT | Age: 76
End: 2021-06-14

## 2021-06-14 DIAGNOSIS — I63.9 ACUTE CVA (CEREBROVASCULAR ACCIDENT) (HCC): Primary | ICD-10-CM

## 2021-06-14 NOTE — TELEPHONE ENCOUNTER
[facility] in [place], [place] will not take orders from home health therapy dept. Please have PCP, Dr. Miguelina Russo, call or fax the outpatient therapy orders to [facility] in [place], [place]. Thank you.   Electronically signed by Adan Bullard RN on 6/14/2021 at 11:32 AM

## 2021-06-14 NOTE — CARE COORDINATION
[facility] Transitions Follow Up Call    2021    Patient: Donna Bowden  Patient : 1945   MRN: [id number]    Discharge Date: 21 RARS: Readmission Risk Score: 15         Spoke with: Vipul Grisblayne Cooper Transitions Subsequent and Final Call    Subsequent and Final Calls  Do you have any ongoing symptoms?: No  Have your medications changed?: No  Do you have any questions related to your medications?: No  Do you currently have any active services?: Yes  Are you currently active with any services?: Home Health  Do you have any needs or concerns that I can assist you with?: No  Identified Barriers: None  Care Transitions Interventions  Other Interventions:         Placed a call to the home and spoke with patient for follow up. She reported that she is doing a lot better. She said that she is using her walker and the leg is cooperating and little better. She said she was able to get out yesterday and go in to [employer]. She said she is not having any other effects at this time. She is eating and drinking well, no issues with bowel patterns, swallowing issues. She has all f her medications and is taking them as ordered. She said her PCP lowered her dosage on her cholesterol meds, as it was causing her some leg pain and she was not sleeping at night. Reported that has improved. She said home health PT was out today and she will be starting outpatient therapy on Friday. She is not aware of any new issues. Reminded of COVID 19 information. TO call prn any issues. CTN to follow up as indicated.        Follow Up  Future Appointments   Date Time Provider Srikanth Tavarez   2021  2:15 PM MD ALFRED Field [facility]   2021  2:30 PM MD TATUM Holland [facility]       Estephanie Lay RN

## 2021-06-15 ENCOUNTER — TELEPHONE (OUTPATIENT)
Dept: INTERNAL MEDICINE CLINIC | Age: 76
End: 2021-06-15

## 2021-06-15 NOTE — TELEPHONE ENCOUNTER
Per [facility] OT:   Patient appropriate for discharge at this time. Skilled service provided UE therapeutic exercise.     Education completion/further needed: Instructed in UE HEP good return demo of UE there ex GONZALES slow to compelte but reports will continue HEP and feels she is using that arm some better per pt completes dressing with supervision and showers with supervision from     Discharge this date per patient request

## 2021-06-21 ENCOUNTER — CARE COORDINATION (OUTPATIENT)
Dept: CASE MANAGEMENT | Age: 76
End: 2021-06-21

## 2021-06-21 NOTE — CARE COORDINATION
[facility] Transitions Follow Up Call    2021    Patient: Deidra Walton  Patient : 1945   MRN: [id number]    Discharge Date: 21 RARS: Readmission Risk Score: 15         Spoke with: N/A    Care Transitions Subsequent and Final Call    Subsequent and Final Calls  Are you currently active with any services?: Home Health  Care Transitions Interventions  Other Interventions:         Attempted to make contact with patient/caregiver for a routine follow up call without success. Unable to leave a HIPAA compliant message regarding intent of call and call back information. Call went immediately to an unidentifiable voice messaging sytem (mobile voice mail or telephone answering machine). Will try again at a later time.      Follow Up  Future Appointments   Date Time Provider Srikanth Tavarez   2021  2:15 PM MD ALFRED Gama [facility]   2021  2:30 PM Lou Durán MD [facility]       Riddhi Echeverria RN

## 2021-06-28 ENCOUNTER — CARE COORDINATION (OUTPATIENT)
Dept: CASE MANAGEMENT | Age: 76
End: 2021-06-28

## 2021-06-28 NOTE — CARE COORDINATION
Mike [facility] Transitions Follow Up Call    2021    Patient: Massiel Stiles  Patient : 1945   MRN: [id number]  Reason for Admission:   Discharge Date: 21 RARS: Readmission Risk Score: 13         Spoke with: N/A    Care Transitions Subsequent and Final Call    Subsequent and Final Calls  Are you currently active with any services?: Home Health  Care Transitions Interventions  Other Interventions: Follow Up : Attempt #2 to make contact with Ochoa Grimes for an initial follow up call post discharge from the hospital without success. Unable to leave a message regarding intent of call and call back information. Will discharge from CTN services at this time due to inability to make contact.     Future Appointments   Date Time Provider Srikanth Tavarez   2021  2:15 PM MD ALFRED Crane [facility]   2021  2:30 PM MD TATUM Heredia [facility] [place]       Marleni Isabel RN

## 2021-07-06 DIAGNOSIS — E03.9 ACQUIRED HYPOTHYROIDISM: ICD-10-CM

## 2021-07-06 DIAGNOSIS — E53.8 B12 DEFICIENCY: ICD-10-CM

## 2021-07-06 DIAGNOSIS — E66.09 EXOGENOUS OBESITY: ICD-10-CM

## 2021-07-06 DIAGNOSIS — I48.0 PAF (PAROXYSMAL ATRIAL FIBRILLATION) (HCC): ICD-10-CM

## 2021-07-06 DIAGNOSIS — E78.00 PURE HYPERCHOLESTEROLEMIA: ICD-10-CM

## 2021-07-06 DIAGNOSIS — R73.01 IFG (IMPAIRED FASTING GLUCOSE): ICD-10-CM

## 2021-07-06 DIAGNOSIS — Z11.59 NEED FOR HEPATITIS C SCREENING TEST: ICD-10-CM

## 2021-07-06 DIAGNOSIS — E55.9 VITAMIN D DEFICIENCY: ICD-10-CM

## 2021-07-06 LAB
ALBUMIN SERPL-MCNC: 4.2 G/DL (ref 3.5–5.2)
ALP BLD-CCNC: 70 U/L (ref 35–104)
ALT SERPL-CCNC: 13 U/L (ref 5–33)
ANION GAP SERPL CALCULATED.3IONS-SCNC: 11 MMOL/L (ref 7–19)
AST SERPL-CCNC: 18 U/L (ref 5–32)
BILIRUB SERPL-MCNC: 0.8 MG/DL (ref 0.2–1.2)
BUN BLDV-MCNC: 12 MG/DL (ref 8–23)
CALCIUM SERPL-MCNC: 9.6 MG/DL (ref 8.8–10.2)
CHLORIDE BLD-SCNC: 106 MMOL/L (ref 98–111)
CHOLESTEROL, TOTAL: 169 MG/DL (ref 160–199)
CO2: 25 MMOL/L (ref 22–29)
CREAT SERPL-MCNC: 0.6 MG/DL (ref 0.5–0.9)
GFR AFRICAN AMERICAN: >59
GFR NON-AFRICAN AMERICAN: >60
GLUCOSE BLD-MCNC: 97 MG/DL (ref 74–109)
HBA1C MFR BLD: 5.4 % (ref 4–6)
HDLC SERPL-MCNC: 70 MG/DL (ref 65–121)
HEPATITIS C ANTIBODY INTERPRETATION: NORMAL
LDL CHOLESTEROL CALCULATED: 81 MG/DL
POTASSIUM SERPL-SCNC: 4.4 MMOL/L (ref 3.5–5)
SODIUM BLD-SCNC: 142 MMOL/L (ref 136–145)
T4 FREE: 1.56 NG/DL (ref 0.93–1.7)
TOTAL PROTEIN: 7 G/DL (ref 6.6–8.7)
TRIGL SERPL-MCNC: 88 MG/DL (ref 0–149)
TSH SERPL DL<=0.05 MIU/L-ACNC: 2.9 UIU/ML (ref 0.27–4.2)
VITAMIN B-12: 1536 PG/ML (ref 211–946)
VITAMIN D 25-HYDROXY: 36.2 NG/ML

## 2021-07-07 PROCEDURE — 93248 EXT ECG>7D<15D REV&INTERPJ: CPT | Performed by: INTERNAL MEDICINE

## 2021-07-07 NOTE — PROCEDURES
The predominant rhythm is sinus at 51 to 119 bpm  There are occasional PACs, 1.8% burden. There are reported 38 episodes of SVT but not all representative strips were SVT. The longest episode less than 16 beats in length. No atrial fibrillation. There are rare PVCs with no ventricular tachycardia  There are no significant pauses  No diary entries or triggers were reported.

## 2021-07-19 RX ORDER — LEVOTHYROXINE SODIUM 0.07 MG/1
TABLET ORAL
Qty: 90 TABLET | Refills: 0 | Status: SHIPPED | OUTPATIENT
Start: 2021-07-19 | End: 2021-10-12

## 2021-08-09 ENCOUNTER — TELEPHONE (OUTPATIENT)
Dept: INTERNAL MEDICINE | Age: 76
End: 2021-08-09

## 2021-08-09 RX ORDER — ROSUVASTATIN CALCIUM 10 MG/1
10 TABLET, COATED ORAL NIGHTLY
Qty: 30 TABLET | Refills: 5 | Status: SHIPPED | OUTPATIENT
Start: 2021-08-09 | End: 2021-09-03 | Stop reason: CLARIF

## 2021-08-09 NOTE — TELEPHONE ENCOUNTER
Called pt to see how she is taking her crestor she was to call us back to let us know how she does on 20 mg and we never heard back

## 2021-08-12 ENCOUNTER — OFFICE VISIT (OUTPATIENT)
Dept: NEUROLOGY | Age: 76
End: 2021-08-12
Payer: MEDICARE

## 2021-08-12 VITALS
WEIGHT: 192 LBS | HEIGHT: 65 IN | BODY MASS INDEX: 31.99 KG/M2 | HEART RATE: 77 BPM | RESPIRATION RATE: 16 BRPM | DIASTOLIC BLOOD PRESSURE: 85 MMHG | SYSTOLIC BLOOD PRESSURE: 137 MMHG

## 2021-08-12 DIAGNOSIS — I63.411 CEREBROVASCULAR ACCIDENT (CVA) DUE TO EMBOLISM OF RIGHT MIDDLE CEREBRAL ARTERY (HCC): Primary | ICD-10-CM

## 2021-08-12 DIAGNOSIS — I48.0 PAROXYSMAL ATRIAL FIBRILLATION (HCC): ICD-10-CM

## 2021-08-12 PROCEDURE — 4040F PNEUMOC VAC/ADMIN/RCVD: CPT | Performed by: PSYCHIATRY & NEUROLOGY

## 2021-08-12 PROCEDURE — G8399 PT W/DXA RESULTS DOCUMENT: HCPCS | Performed by: PSYCHIATRY & NEUROLOGY

## 2021-08-12 PROCEDURE — 99214 OFFICE O/P EST MOD 30 MIN: CPT | Performed by: PSYCHIATRY & NEUROLOGY

## 2021-08-12 PROCEDURE — 1090F PRES/ABSN URINE INCON ASSESS: CPT | Performed by: PSYCHIATRY & NEUROLOGY

## 2021-08-12 PROCEDURE — 1036F TOBACCO NON-USER: CPT | Performed by: PSYCHIATRY & NEUROLOGY

## 2021-08-12 PROCEDURE — G8427 DOCREV CUR MEDS BY ELIG CLIN: HCPCS | Performed by: PSYCHIATRY & NEUROLOGY

## 2021-08-12 PROCEDURE — G8417 CALC BMI ABV UP PARAM F/U: HCPCS | Performed by: PSYCHIATRY & NEUROLOGY

## 2021-08-12 PROCEDURE — 1123F ACP DISCUSS/DSCN MKR DOCD: CPT | Performed by: PSYCHIATRY & NEUROLOGY

## 2021-08-12 RX ORDER — APIXABAN 5 MG/1
TABLET, FILM COATED ORAL
COMMUNITY
Start: 2021-07-22 | End: 2022-03-22 | Stop reason: SDUPTHER

## 2021-08-12 RX ORDER — AMIODARONE HYDROCHLORIDE 200 MG/1
TABLET ORAL
COMMUNITY
Start: 2021-07-22 | End: 2022-03-22

## 2021-08-12 NOTE — PROGRESS NOTES
[address] [facility]  [address] [place] [address] Drive, [address]  [facility], [facility] [address]  Phone [phone]  Fax [phone] [address] [facility] Follow Up Encounter  21 2:41 PM CDT    Information:   Patient Name: Ko Cain  :   1945  Age:   66 y.o. MRN:   [id number]  Account #:  [de-identified]  Today:  21    Provider: Dary Lipscomb M.D. Chief Complaint:   Chief Complaint   Patient presents with    Follow-Up from Hospital     Stroke       Subjective:   Ko Cain is a 66 y.o. woman with a history of hypertension, hyperlipidemia who is following up after a hospitalization for a stroke. She had one remote episode of atrial fibrillation that required cardioversion. On 6/3, she woke up with left sided weakness. MRI head showed a right frontal periventricular lacunar infarct. Evaluation was unrevealing. I recommended putting her on Plavix as this was most likely a small vessel thrombosis. She was discharged on Plavix, ASA, Crestor. She went home and had home health for continued PT/OT. She has had considerable improvement. She can ambulate with a walker. She is independent with personal care. She had a Zio pathc that showed several SVT episodes but no atrial fibrillation. Somehow, her medication list shows that she is now on Eliquis. She does not know what she is taking. I can't find that Eliquis has been prescribed from this EMR.         Objective:     Past Medical History:  Past Medical History:   Diagnosis Date    Breast cancer ([address] 2014    right    GERD (gastroesophageal reflux disease)     Hyperlipidemia     Thyroid disease     Wears glasses        Past Surgical History:   Procedure Laterality Date    BREAST SURGERY Bilateral     FIBROIDS REMOVED FROM [facility] BREAST    BREAST SURGERY Right 14    R partial mastectomy    BREAST SURGERY Right 14    Rt JUAN R cath insertion    EYE SURGERY      cataracts    MELVINA AND BSO         Recent Hospitalizations  None    Significant Injuries  None    Habits  Navi Navarro reports that she has never smoked. She has never used smokeless tobacco. She reports that she does not drink alcohol and does not use drugs. Family History   Problem Relation Age of Onset    Cancer Mother         throat    Cancer Maternal Grandmother         ovarian    Parkinsonism Father     Breast Cancer Neg Hx        Social History  Navi Navarro is , lives in [facility], and is retired. Medications:  Current Outpatient Medications   Medication Sig Dispense Refill    ELIQUIS 5 MG TABS tablet TAKE 1 TABLET BY MOUTH TWICE DAILY      PACERONE 200 MG tablet TAKE 1 TABLET BY MOUTH ONCE DAILY      rosuvastatin (CRESTOR) 10 MG tablet Take 1 tablet by mouth nightly (Patient taking differently: Take 20 mg by mouth nightly ) 30 tablet 5    levothyroxine (SYNTHROID) 75 MCG tablet Take 1 tablet by mouth once daily 90 tablet 0    vitamin B-12 (CYANOCOBALAMIN) 100 MCG tablet Take 50 mcg by mouth daily      latanoprost (XALATAN) 0.005 % ophthalmic solution Place 1 drop into both eyes nightly       Vitamin D (CHOLECALCIFEROL) 1000 UNITS CAPS capsule Take 2,000 Units by mouth daily       aspirin 81 MG tablet Take 81 mg by mouth daily. No current facility-administered medications for this visit. Allergies:   Allergies as of 08/12/2021    (No Known Allergies)       Review of Systems:  Constitutional: negative for - chills and fever  Eyes:  negative for - visual disturbance and photophobia  HENMT: negative for - headaches and sinus pain  Respiratory: negative for - cough, hemoptysis, and shortness of breath  Cardiovascular: negative for - chest pain and palpitations  Gastrointestinal: negative for - blood in stools, constipation, diarrhea, nausea, and vomiting  Genito-Urinary: negative for - hematuria, urinary frequency, urinary urgency, and urinary retention  Musculoskeletal: negative for - joint pain, joint stiffness, and joint swelling  Hematological and Lymphatic: negative for - bleeding problems, abnormal bruising, and swollen lymph nodes  Endocrine:  negative for - polydipsia and polyphagia  Allergy/Immunology:  negative for - rhinorrhea, sinus congestion, hives  Integument:  negative for - negative for - rash, change in moles, new or changing lesions  Psychological: negative for - anxiety and depression  Neurological: negative for - memory loss, numbness/tingling  Positive for - weakness     PHYSICAL EXAMINATION:  Vitals:  /85   Pulse 77   Resp 16   Ht 5' 5\" (1.651 m)   Wt 192 lb (87.1 kg)   BMI 31.95 kg/m²   General appearance:  Alert, well developed, well nourished, in no distress  HEENT:  normocephalic, atraumatic, sclera appear normal, no nasal abnormalities, no rhinorrhea, Ears appear normal, oral mucous membranes are moist without erythema, trachea midline, thyroid is normal, no lymphadenopathy or neck mass. Cardiovascular:  Regular rate and rhythm without murmer. No peripheral edema, No cyanosis or clubbing. No carotid bruits. Pulmonary:  Lungs are clear to auscultation. Breathing appears normal, good expansion, normal effort without use of accessory muscles  Musculoskeletal:  Joints are arthritic  Integument:  No rash, erythema, or pallor  Psychiatric:  Mood, affect, and behavior appear normal      NEUROLOGIC EXAMINATION:  Mental Status:  alert, oriented to person, place, and time. Speech:  Clear without dysarthria or dysphonia  Language:  Fluent without aphasia  Cranial Nerves:   II Visual fields are full to confrontation   III,IV, VI Extraocular movements are full   VII Facial movements are symmetrical without weakness   VIII Hearing is intact   IX,X Shoulder shrug and head rotation strength are intact   XII No tongue atrophy or fasciculations. Normal tongue protrusion. No tongue weakness  Motor:  Normal strength in both upper and lower extremities. Normal muscle tone and bulk.   Deep tendon reflexes areabsent. Gibson's signs are absent bilaterally. There is no ankle clonus on either side. Sensation:  Sensation is intact to light touch, temperature, and vibration in all extremities. Coordination:  Rapid alternating movements are normal in both upper and lower extremities. Finger to nose testing is unimpaired bilaterally. Gait:  unsteady    Pertinent Diagnostic Studies:  Narrative   EXAM: MRI BRAIN WO CONTRAST -- 6/3/2021 12:42 PM   HISTORY: 66 years, Female, CVA, left-sided weakness x1 day   COMPARISON: 6/3/2021 CT head   TECHNIQUE:    Routine pulse sequences of the brain were obtained without IV   contrast.    FINDINGS:    There is a oval 13 x 8 mm area of restricted diffusion consistent with   acute infarct in the right frontal periventricular white matter. Focus   of associated susceptibility artifact suggesting microhemorrhage. Mild T2/FLAIR hyperintensity in the periventricular and subcortical   white matter suggesting mild chronic microvascular changes. Mild   proportional prominence of the ventricles, sulci and basilar cisterns   suggesting volume loss. Partially empty sella. Remaining midline structures appear within   normal limits. Brainstem and posterior fossa are within normal limits. Visualized flow voids within normal limits. Thinning of the ocular   lenses may be postoperative or age-related. Paranasal sinuses and   mastoid air cells appear within normal limits. Visualized overlying   soft tissues within normal limits.       Impression   1. Right frontal periventricular white matter within oval 13 x 8 mm   area of acute infarct and associated punctate microhemorrhage. Results communicated by telephone to Dr. Nat Jeffries at 1:29 PM   6/3/2021.     Signed by Dr Gabriella Gaitan on 6/3/2021 2:32 PM     Narrative   EXAMINATION: [address] [place] [address]  6/3/2021 12:25 PM   HISTORY: Stroke symptoms   COMPARISON: Head CT performed today   TECHNIQUE: CT angiography the neck was performed.   Radiation dose   equals  mGy cm. AUTOMATED EXPOSURE CONTROL dose reduction   technique was implemented. Thin section axial images were obtained with intravenous contrast.   Multi projection reconstruction images were generated. Multi projection maximum intensity projection angiographic images were   obtained. ICA stenosis calculated using NASCET criteria. FINDINGS:    There are aortic arch calcifications. There are calcifications   involving the brachycephalic vessel origins arising from the arch   without stenosis. There is ectasia of the left common carotid artery proximally. The   left common carotid arteries widely patent. The carotid bifurcation is widely patent. There is posterior medial calcified atheromatous plaque involving the   proximal left ICA just distal to the bifurcation without stenosis. The left internal carotid arteries widely patent without   steno-occlusive changes. The left external carotid arteries widely patent. There is ectasia of the right common carotid artery proximally. The carotid bifurcation is widely patent. There is posterior calcified atheromatous plaque involving the origin   of the right internal carotid artery without stenosis. The right ICA distally is widely patent without steno-occlusive   changes with mild ectasia. The right external carotid artery is patent and uncompromised. The vertebral arteries are patent without stenosis. The left vertebral   artery is mildly dominant. The upper lung fields are grossly clear. Degenerative changes of the cervical spine with multilevel disc   degeneration.       Impression   1. Mild calcified atheromatous plaque involving the internal carotid   artery origins bilaterally without stenosis. 2. Otherwise negative CT angiography the neck without hemodynamically   significant stenosis. Signed by Dr Sheridan Boast on 6/3/2021 12:31 PM     Narrative   Examination.  CTA HEAD W CONTRAST DOP/COLR. Study Location: Echo Lab  Technical Quality: Adequate visualization     Patient Status: Inpatient     Contrast Medium: Bubble Study.     Indications:CVA.      Conclusions      Summary   LV is normal in size with hyperdynamic LV systolic function. LV ejection   fraction estimated at 70%. Mild concentric LVH. Grade 1 diastolic dysfunction. RV is normal in size with normal systolic function. Mild to moderate left atrial enlargement. Normal right atrial size. Aortic valve appears trileaflet with normal leaflet mobility. No stenosis   or significant regurgitation. Mitral valve appears structurally normal with normal leaflet mobility. No   stenosis or significant regurgitation. Moderate tricuspid regurgitation. RVSP estimated at 30 mmHg. Coronary sinus appears dilated on some views. Bubble study fails to demonstrate any significant right-to-left shunting. No significant pericardial effusion. Signature      ----------------------------------------------------------------   Electronically signed by Dina Vital MD(Interpreting physician)   on 06/03/2021 09:51 PM    Procedures   [address] [facility]ARTINE[facility]) [[id number]]          Signed             The predominant rhythm is sinus at 51 to 119 bpm  There are occasional PACs, 1.8% burden. There are reported 38 episodes of SVT but not all representative strips were SVT. The longest episode less than 16 beats in length. No atrial fibrillation. There are rare PVCs with no ventricular tachycardia  There are no significant pauses  No diary entries or triggers were reported. Assessment:       ICD-10-CM    1. Cerebrovascular accident (CVA) due to embolism of right middle cerebral artery ([address]  I63.411    2. Paroxysmal atrial fibrillation (HCC)  I48.0    I discussed the diagnosis, pathophysiology, symptoms, risks, prognosis, and treatment options of stroke with Trey Cho. Plan:   1.  I recommended a sleep study.  She declines. She says she would not wear a mask or use a CPAP. 2. No sure why she is on Eliquis and not Plavix or how that got changed.     3. FU in 6 months    Electronically signed by Alberto Ponce MD on 8/12/21

## 2021-09-03 ENCOUNTER — OFFICE VISIT (OUTPATIENT)
Dept: INTERNAL MEDICINE | Age: 76
End: 2021-09-03
Payer: MEDICARE

## 2021-09-03 VITALS
RESPIRATION RATE: 18 BRPM | BODY MASS INDEX: 31.99 KG/M2 | HEART RATE: 74 BPM | OXYGEN SATURATION: 96 % | WEIGHT: 192 LBS | HEIGHT: 65 IN | DIASTOLIC BLOOD PRESSURE: 72 MMHG | SYSTOLIC BLOOD PRESSURE: 110 MMHG

## 2021-09-03 DIAGNOSIS — E03.9 ACQUIRED HYPOTHYROIDISM: ICD-10-CM

## 2021-09-03 DIAGNOSIS — E78.00 PURE HYPERCHOLESTEROLEMIA: ICD-10-CM

## 2021-09-03 DIAGNOSIS — E55.9 VITAMIN D DEFICIENCY: ICD-10-CM

## 2021-09-03 DIAGNOSIS — R73.01 IFG (IMPAIRED FASTING GLUCOSE): ICD-10-CM

## 2021-09-03 DIAGNOSIS — I48.0 PAF (PAROXYSMAL ATRIAL FIBRILLATION) (HCC): Primary | ICD-10-CM

## 2021-09-03 DIAGNOSIS — J01.00 ACUTE NON-RECURRENT MAXILLARY SINUSITIS: ICD-10-CM

## 2021-09-03 DIAGNOSIS — R05.9 COUGH: ICD-10-CM

## 2021-09-03 DIAGNOSIS — E53.8 B12 DEFICIENCY: ICD-10-CM

## 2021-09-03 PROCEDURE — G8399 PT W/DXA RESULTS DOCUMENT: HCPCS | Performed by: INTERNAL MEDICINE

## 2021-09-03 PROCEDURE — 1036F TOBACCO NON-USER: CPT | Performed by: INTERNAL MEDICINE

## 2021-09-03 PROCEDURE — G8417 CALC BMI ABV UP PARAM F/U: HCPCS | Performed by: INTERNAL MEDICINE

## 2021-09-03 PROCEDURE — 1123F ACP DISCUSS/DSCN MKR DOCD: CPT | Performed by: INTERNAL MEDICINE

## 2021-09-03 PROCEDURE — G8427 DOCREV CUR MEDS BY ELIG CLIN: HCPCS | Performed by: INTERNAL MEDICINE

## 2021-09-03 PROCEDURE — 99214 OFFICE O/P EST MOD 30 MIN: CPT | Performed by: INTERNAL MEDICINE

## 2021-09-03 PROCEDURE — 1090F PRES/ABSN URINE INCON ASSESS: CPT | Performed by: INTERNAL MEDICINE

## 2021-09-03 PROCEDURE — 4040F PNEUMOC VAC/ADMIN/RCVD: CPT | Performed by: INTERNAL MEDICINE

## 2021-09-03 RX ORDER — CEFDINIR 300 MG/1
300 CAPSULE ORAL 2 TIMES DAILY
Qty: 16 CAPSULE | Refills: 0 | Status: SHIPPED | OUTPATIENT
Start: 2021-09-03 | End: 2021-09-11

## 2021-09-03 RX ORDER — ALBUTEROL SULFATE 90 UG/1
2 AEROSOL, METERED RESPIRATORY (INHALATION) 4 TIMES DAILY PRN
Qty: 18 G | Refills: 0 | Status: SHIPPED | OUTPATIENT
Start: 2021-09-03 | End: 2022-05-19 | Stop reason: CLARIF

## 2021-09-03 RX ORDER — ROSUVASTATIN CALCIUM 10 MG/1
10 TABLET, COATED ORAL DAILY
COMMUNITY
End: 2021-09-07 | Stop reason: SDUPTHER

## 2021-09-03 ASSESSMENT — ENCOUNTER SYMPTOMS
CONSTIPATION: 0
CHEST TIGHTNESS: 0
SORE THROAT: 0
WHEEZING: 1
ABDOMINAL PAIN: 0
COUGH: 1

## 2021-09-03 NOTE — PROGRESS NOTES
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 female who presents today for follow up on her chronic medical conditions as noted below.       Patient Active Problem List    Diagnosis Date Noted    Acute CV[facility]cerebrovascular accident) ([facility] 06/03/2021    GERD (gastroesophageal reflux disease)     Non-rheumatic mitral regurgitation 11/16/2017     Overview Note:     2014 mil/ mod      Pure hypercholesterolemia 11/16/2017    IFG (impaired fasting glucose) 11/16/2017    Acquired hypothyroidism 11/16/2017    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    B12 deficiency 11/16/2017    Vitamin D deficiency 11/16/2017    PAF (paroxysmal atrial fibrillation) ([facility] 09/07/2014     Overview Note:     9/6/2014  DCCV in ED  09/07/2014  Echo  Normal LVFX  09/07/2014  lexiscan negative for myocardial ischemia, EF 62 %            Personal history of breast cancer 08/20/2014    S/P lumpectomy, right breast 1/7/14 02/12/2014    Breast cancer ([facility] 01/2014     Overview Note:     right      Malignant neoplasm of other specified sites of female breast 12/09/2013     Past Medical History:   Diagnosis Date    Breast cancer ([facility] 1/2014    right    GERD (gastroesophageal reflux disease)     Hyperlipidemia     Thyroid disease     Wears glasses       Past Surgical History:   Procedure Laterality Date    BREAST SURGERY Bilateral 1985    FIBROIDS REMOVED FROM EACH BREAST    BREAST SURGERY Right 1/7/14    R partial mastectomy    BREAST SURGERY Right 1/22/14    Rt JUAN R cath insertion    EYE SURGERY      cataracts    MELVINA AND BSO       Current Outpatient Medications   Medication Sig Dispense Refill    rosuvastatin (CRESTOR) 10 MG tablet Take 10 mg by mouth daily      albuterol sulfate HFA (VENTOLIN HFA) 108 (90 Base) MCG/ACT inhaler Inhale 2 puffs into the lungs 4 times daily as needed for Wheezing 18 g 0    cefdinir (OMNICEF) 300 MG capsule Take 1 capsule by mouth 2 times daily for 8 days 16 capsule 0    ELIQUIS 5 MG TABS tablet TAKE 1 TABLET BY MOUTH TWICE DAILY      PACERONE 200 MG tablet TAKE 1 TABLET BY MOUTH ONCE DAILY      levothyroxine (SYNTHROID) 75 MCG tablet Take 1 tablet by mouth once daily 90 tablet 0    vitamin B-12 (CYANOCOBALAMIN) 100 MCG tablet Take 50 mcg by mouth daily      latanoprost (XALATAN) 0.005 % ophthalmic solution Place 1 drop into both eyes nightly       Vitamin D (CHOLECALCIFEROL) 1000 UNITS CAPS capsule Take 2,000 Units by mouth daily       aspirin 81 MG tablet Take 81 mg by mouth daily. No current facility-administered medications for this visit. No Known Allergies  Social History     Tobacco Use    Smoking status: Never Smoker    Smokeless tobacco: Never Used   Substance Use Topics    Alcohol use: No      Family History   Problem Relation Age of Onset    Cancer Mother         throat    Cancer Maternal Grandmother         ovarian    Parkinsonism Father     Breast Cancer Neg Hx        Review of Systems   Constitutional: Positive for fatigue. Negative for chills and fever. HENT: Negative for congestion, ear pain, nosebleeds, postnasal drip and sore throat. Respiratory: Positive for cough and wheezing. Negative for chest tightness. Cardiovascular: Negative for chest pain, palpitations and leg swelling. Gastrointestinal: Negative for abdominal pain and constipation. Genitourinary: Negative for dysuria and urgency. Musculoskeletal: Negative. Negative for arthralgias. Skin: Negative for rash. Neurological: Negative for dizziness and headaches. Psychiatric/Behavioral: Negative. Vitals:    09/03/21 1110   BP: 110/72   Site: Left Upper Arm   Position: Sitting   Cuff Size: Large Adult   Pulse: 74   Resp: 18   SpO2: 96%   Weight: 192 lb (87.1 kg)   Height: 5' 5\" (1.651 m)     Body mass index is 31.95 kg/m².     Physical Exam  Constitutional: Appearance: She is well-developed. HENT:      Right Ear: External ear normal.      Left Ear: External ear normal.      Mouth/Throat:      Pharynx: No oropharyngeal exudate. Eyes:      Conjunctiva/sclera: Conjunctivae normal.      Pupils: Pupils are equal, round, and reactive to light. Neck:      Thyroid: No thyromegaly. Vascular: No JVD. Cardiovascular:      Rate and Rhythm: Normal rate. Heart sounds: Normal heart sounds. No murmur heard. Pulmonary:      Effort: No respiratory distress. Breath sounds: Rales present. No wheezing. Chest:      Chest wall: No tenderness. Abdominal:      General: Bowel sounds are normal.      Palpations: Abdomen is soft. Musculoskeletal:      Cervical back: Neck supple. Lymphadenopathy:      Cervical: No cervical adenopathy. Skin:     Findings: No rash.          Lab Review   Orders Only on 07/06/2021   Component Date Value    Vit D, 25-Hydroxy 07/06/2021 36.2     Vitamin B-12 07/06/2021 1536*    T4 Free 07/06/2021 1.56     TSH 07/06/2021 2.900     Sodium 07/06/2021 142     Potassium 07/06/2021 4.4     Chloride 07/06/2021 106     CO2 07/06/2021 25     Anion Gap 07/06/2021 11     Glucose 07/06/2021 97     BUN 07/06/2021 12     CREATININE 07/06/2021 0.6     GFR Non- 07/06/2021 >60     GFR  07/06/2021 >59     Calcium 07/06/2021 9.6     Total Protein 07/06/2021 7.0     Albumin 07/06/2021 4.2     Total Bilirubin 07/06/2021 0.8     Alkaline Phosphatase 07/06/2021 70     ALT 07/06/2021 13     AST 07/06/2021 18     Hemoglobin A1C 07/06/2021 5.4     Cholesterol, Total 07/06/2021 169     Triglycerides 07/06/2021 88     HDL 07/06/2021 70     LDL Calculated 07/06/2021 81     Hep C Ab Interp 07/06/2021 Non-Reactive            ASSESSMENT/PLAN:    Acute bronchitis  Sinus pressure  Symptoms ongoing for last 3 days  Patient notices wheezing  No fevers chills  ++ discolored sputum  No Covid contacts  Family members are not sick  Rx albuterol inhaler  Rx Omnicef  COVID-19 swab done at office today      Pure hypercholesterolemia-   LDL is better 153 (173 ( 126) (159)( 137) (146) (106)  Off simvastatin since 2019  !!!lower fat diet and exercise program  During last visit I have presented patient with following data: calculated 10 year risk for atherosclerotic cardiovascular disease event is 22.2  % based on pooled cohort risk assessment  (Pooled Cohort 10y ASCVD Risk Assessment Equation)  Refuses to try again  Understands risk        Acquired hypothyroidism-her thyroid levels =  Results reviewed + dw pt  TFT'good range  RX Synthroid 75 µg daily-      B12 Now CE 046 ( 486)(748)  Lab discussed with patient  RX 1 MG B12 DAILY PO     IFG (impaired fasting glucose)  OBESITY  = elevated A1c - now is 5.6    strict lower carbohydrate diet and weight loss is recommended  Pt was given approximately 5 minutes of counseling about diet and exercise including education on what calories are, where calories come from, the need for portion control,following lower carbohydrate dietary regimen and healthy snacks along side an active lifestyle with supplementary exercise of approx 30 minutes a week, 5 days a week of exercise for weight loss.  The patient voiced increased understanding of the topics discussed.       PAF (paroxysmal atrial fibrillation) ([facility]- no further episdoes  Monitor closely      Vitamin D deficiency-   level 36 in 8/2021   RX vit D 2000 iu daily  Lab reviewed with pt  Increase dose to 2000 IU daily from 1000 IU daily     Exogenous obesity  Pt was given approximately 5 minutes of counseling about diet and exercise including education on what calories are, where calories come from, the need for portion control,following lower carbohydrate dietary regimen and healthy snacks along side an active lifestyle with supplementary exercise of approx 30 minutes a week, 5 days a week of exercise for weight loss.  The patient voiced increased understanding of the topics discussed. Post CVA few months ago  recurrent afib  unable to attend jury duty        Orders Placed This Encounter   Procedures    Comprehensive Metabolic Panel    Hemoglobin A1C    Lipid Panel    Vitamin D 25 Hydroxy    TSH without Reflex    CBC Auto Differential    Urinalysis    T4, Free    COVID-19     New Prescriptions    ALBUTEROL SULFATE HFA (VENTOLIN HFA) 108 (90 BASE) MCG/ACT INHALER    Inhale 2 puffs into the lungs 4 times daily as needed for Wheezing    CEFDINIR (OMNICEF) 300 MG CAPSULE    Take 1 capsule by mouth 2 times daily for 8 days         Return in about 4 months (around 1/3/2022) for Annual Physical.   There are no Patient Instructions on file for this visit. EMR Dragon/transcription disclaimer:Significant part of this  encounter note is electronic transcription/translationof spoken language to printed text. The electronic translation of spoken language may be erroneous, or at times, nonsensical words or phrases may be inadvertently transcribed.  Although I have reviewed the note for sucherrors, some may still exist.

## 2021-09-07 RX ORDER — ROSUVASTATIN CALCIUM 10 MG/1
10 TABLET, COATED ORAL DAILY
Qty: 90 TABLET | Refills: 1 | Status: SHIPPED | OUTPATIENT
Start: 2021-09-07 | End: 2022-03-23

## 2021-09-07 NOTE — TELEPHONE ENCOUNTER
Stevenson Swanson called requesting a refill of the below medication which has been pended for you:     Requested Prescriptions     Pending Prescriptions Disp Refills    rosuvastatin (CRESTOR) 10 MG tablet 90 tablet 1     Sig: Take 1 tablet by mouth daily       Last Appointment Date: 9/3/2021  Next Appointment Date: 1/5/2022    No Known Allergies

## 2021-10-12 RX ORDER — LEVOTHYROXINE SODIUM 0.07 MG/1
TABLET ORAL
Qty: 90 TABLET | Refills: 0 | Status: SHIPPED | OUTPATIENT
Start: 2021-10-12 | End: 2022-01-10

## 2021-10-12 NOTE — TELEPHONE ENCOUNTER
Georgia Beltrán called requesting a refill of the below medication which has been pended for you:     Requested Prescriptions     Pending Prescriptions Disp Refills    levothyroxine (EUTHYROX) 75 MCG tablet [Pharmacy Med Name: Euthyrox 75 MCG Oral Tablet] 90 tablet 0     Sig: Take 1 tablet by mouth once daily       Last Appointment Date: 9/3/2021  Next Appointment Date: 1/5/2022    No Known Allergies

## 2022-01-05 ENCOUNTER — OFFICE VISIT (OUTPATIENT)
Dept: INTERNAL MEDICINE | Age: 77
End: 2022-01-05
Payer: MEDICARE

## 2022-01-05 VITALS
HEART RATE: 82 BPM | OXYGEN SATURATION: 98 % | BODY MASS INDEX: 32.82 KG/M2 | DIASTOLIC BLOOD PRESSURE: 88 MMHG | RESPIRATION RATE: 18 BRPM | WEIGHT: 197 LBS | SYSTOLIC BLOOD PRESSURE: 128 MMHG | HEIGHT: 65 IN

## 2022-01-05 DIAGNOSIS — E55.9 VITAMIN D DEFICIENCY: ICD-10-CM

## 2022-01-05 DIAGNOSIS — I67.9 CEREBROVASCULAR DISEASE: ICD-10-CM

## 2022-01-05 DIAGNOSIS — Z00.00 MEDICARE ANNUAL WELLNESS VISIT, SUBSEQUENT: Primary | ICD-10-CM

## 2022-01-05 DIAGNOSIS — E03.9 ACQUIRED HYPOTHYROIDISM: ICD-10-CM

## 2022-01-05 DIAGNOSIS — R73.01 IFG (IMPAIRED FASTING GLUCOSE): ICD-10-CM

## 2022-01-05 DIAGNOSIS — Z86.73 STATUS POST CVA: ICD-10-CM

## 2022-01-05 DIAGNOSIS — I48.0 PAF (PAROXYSMAL ATRIAL FIBRILLATION) (HCC): ICD-10-CM

## 2022-01-05 DIAGNOSIS — Z00.00 ROUTINE GENERAL MEDICAL EXAMINATION AT A HEALTH CARE FACILITY: ICD-10-CM

## 2022-01-05 DIAGNOSIS — E66.09 EXOGENOUS OBESITY: ICD-10-CM

## 2022-01-05 DIAGNOSIS — E78.00 PURE HYPERCHOLESTEROLEMIA: ICD-10-CM

## 2022-01-05 DIAGNOSIS — E53.8 B12 DEFICIENCY: ICD-10-CM

## 2022-01-05 LAB
ALBUMIN SERPL-MCNC: 4.2 G/DL (ref 3.5–5.2)
ALP BLD-CCNC: 81 U/L (ref 35–104)
ALT SERPL-CCNC: 23 U/L (ref 5–33)
ANION GAP SERPL CALCULATED.3IONS-SCNC: 12 MMOL/L (ref 7–19)
AST SERPL-CCNC: 24 U/L (ref 5–32)
BASOPHILS ABSOLUTE: 0.1 K/UL (ref 0–0.2)
BASOPHILS RELATIVE PERCENT: 0.7 % (ref 0–1)
BILIRUB SERPL-MCNC: 0.8 MG/DL (ref 0.2–1.2)
BILIRUBIN URINE: NEGATIVE
BLOOD, URINE: NEGATIVE
BUN BLDV-MCNC: 12 MG/DL (ref 8–23)
CALCIUM SERPL-MCNC: 9.9 MG/DL (ref 8.8–10.2)
CHLORIDE BLD-SCNC: 107 MMOL/L (ref 98–111)
CHOLESTEROL, TOTAL: 169 MG/DL (ref 160–199)
CLARITY: CLEAR
CO2: 25 MMOL/L (ref 22–29)
COLOR: YELLOW
CREAT SERPL-MCNC: 0.8 MG/DL (ref 0.5–0.9)
EOSINOPHILS ABSOLUTE: 0.3 K/UL (ref 0–0.6)
EOSINOPHILS RELATIVE PERCENT: 3.9 % (ref 0–5)
GFR AFRICAN AMERICAN: >59
GFR NON-AFRICAN AMERICAN: >60
GLUCOSE BLD-MCNC: 95 MG/DL (ref 74–109)
GLUCOSE URINE: NEGATIVE MG/DL
HBA1C MFR BLD: 5.7 % (ref 4–6)
HCT VFR BLD CALC: 45.5 % (ref 37–47)
HDLC SERPL-MCNC: 58 MG/DL (ref 65–121)
HEMOGLOBIN: 14.4 G/DL (ref 12–16)
IMMATURE GRANULOCYTES #: 0 K/UL
KETONES, URINE: NEGATIVE MG/DL
LDL CHOLESTEROL CALCULATED: 85 MG/DL
LEUKOCYTE ESTERASE, URINE: NEGATIVE
LYMPHOCYTES ABSOLUTE: 2.1 K/UL (ref 1.1–4.5)
LYMPHOCYTES RELATIVE PERCENT: 29.2 % (ref 20–40)
MCH RBC QN AUTO: 31.2 PG (ref 27–31)
MCHC RBC AUTO-ENTMCNC: 31.6 G/DL (ref 33–37)
MCV RBC AUTO: 98.5 FL (ref 81–99)
MONOCYTES ABSOLUTE: 0.6 K/UL (ref 0–0.9)
MONOCYTES RELATIVE PERCENT: 7.8 % (ref 0–10)
NEUTROPHILS ABSOLUTE: 4.2 K/UL (ref 1.5–7.5)
NEUTROPHILS RELATIVE PERCENT: 58.1 % (ref 50–65)
NITRITE, URINE: NEGATIVE
PDW BLD-RTO: 12.7 % (ref 11.5–14.5)
PH UA: 6.5 (ref 5–8)
PLATELET # BLD: 309 K/UL (ref 130–400)
PMV BLD AUTO: 11.7 FL (ref 9.4–12.3)
POTASSIUM SERPL-SCNC: 4.3 MMOL/L (ref 3.5–5)
PROTEIN UA: NEGATIVE MG/DL
RBC # BLD: 4.62 M/UL (ref 4.2–5.4)
SODIUM BLD-SCNC: 144 MMOL/L (ref 136–145)
SPECIFIC GRAVITY UA: 1.01 (ref 1–1.03)
T4 FREE: 1.74 NG/DL (ref 0.93–1.7)
TOTAL PROTEIN: 7 G/DL (ref 6.6–8.7)
TRIGL SERPL-MCNC: 128 MG/DL (ref 0–149)
TSH SERPL DL<=0.05 MIU/L-ACNC: 7.74 UIU/ML (ref 0.27–4.2)
UROBILINOGEN, URINE: 0.2 E.U./DL
VITAMIN D 25-HYDROXY: 31 NG/ML
WBC # BLD: 7.2 K/UL (ref 4.8–10.8)

## 2022-01-05 PROCEDURE — G0439 PPPS, SUBSEQ VISIT: HCPCS | Performed by: INTERNAL MEDICINE

## 2022-01-05 PROCEDURE — G8427 DOCREV CUR MEDS BY ELIG CLIN: HCPCS | Performed by: INTERNAL MEDICINE

## 2022-01-05 PROCEDURE — 1090F PRES/ABSN URINE INCON ASSESS: CPT | Performed by: INTERNAL MEDICINE

## 2022-01-05 PROCEDURE — 1123F ACP DISCUSS/DSCN MKR DOCD: CPT | Performed by: INTERNAL MEDICINE

## 2022-01-05 PROCEDURE — G8417 CALC BMI ABV UP PARAM F/U: HCPCS | Performed by: INTERNAL MEDICINE

## 2022-01-05 PROCEDURE — G8484 FLU IMMUNIZE NO ADMIN: HCPCS | Performed by: INTERNAL MEDICINE

## 2022-01-05 PROCEDURE — G8399 PT W/DXA RESULTS DOCUMENT: HCPCS | Performed by: INTERNAL MEDICINE

## 2022-01-05 PROCEDURE — 4040F PNEUMOC VAC/ADMIN/RCVD: CPT | Performed by: INTERNAL MEDICINE

## 2022-01-05 PROCEDURE — 99214 OFFICE O/P EST MOD 30 MIN: CPT | Performed by: INTERNAL MEDICINE

## 2022-01-05 PROCEDURE — 1036F TOBACCO NON-USER: CPT | Performed by: INTERNAL MEDICINE

## 2022-01-05 RX ORDER — VITAMIN B COMPLEX
TABLET ORAL DAILY
COMMUNITY

## 2022-01-05 ASSESSMENT — ENCOUNTER SYMPTOMS
WHEEZING: 0
COUGH: 0
CONSTIPATION: 0
ABDOMINAL PAIN: 0
CHEST TIGHTNESS: 0
SORE THROAT: 0

## 2022-01-05 ASSESSMENT — LIFESTYLE VARIABLES: HOW OFTEN DO YOU HAVE A DRINK CONTAINING ALCOHOL: 0

## 2022-01-05 ASSESSMENT — PATIENT HEALTH QUESTIONNAIRE - PHQ9
1. LITTLE INTEREST OR PLEASURE IN DOING THINGS: 0
2. FEELING DOWN, DEPRESSED OR HOPELESS: 0
SUM OF ALL RESPONSES TO PHQ QUESTIONS 1-9: 0
SUM OF ALL RESPONSES TO PHQ9 QUESTIONS 1 & 2: 0
SUM OF ALL RESPONSES TO PHQ QUESTIONS 1-9: 0

## 2022-01-05 NOTE — PATIENT INSTRUCTIONS
Personalized Preventive Plan for Jen Arteaga - 1/5/2022  Medicare offers a range of preventive health benefits. Some of the tests and screenings are paid in full while other may be subject to a deductible, co-insurance, and/or copay. Some of these benefits include a comprehensive review of your medical history including lifestyle, illnesses that may run in your family, and various assessments and screenings as appropriate. After reviewing your medical record and screening and assessments performed today your provider may have ordered immunizations, labs, imaging, and/or referrals for you. A list of these orders (if applicable) as well as your Preventive Care list are included within your After Visit Summary for your review. Other Preventive Recommendations:    · A preventive eye exam performed by an eye specialist is recommended every 1-2 years to screen for glaucoma; cataracts, macular degeneration, and other eye disorders. · A preventive dental visit is recommended every 6 months. · Try to get at least 150 minutes of exercise per week or 10,000 steps per day on a pedometer . · Order or download the FREE \"Exercise & Physical Activity: Your Everyday Guide\" from [employer]. Call 4-731.856.4344 or search [employer] online. · You need 3174-0680 mg of calcium and 8362-2609 IU of vitamin D per day. It is possible to meet your calcium requirement with diet alone, but a vitamin D supplement is usually necessary to meet this goal.  · When exposed to the sun, use a sunscreen that protects against both UVA and UVB radiation with an SPF of 30 or greater. Reapply every 2 to 3 hours or after sweating, drying off with a towel, or swimming. · Always wear a seat belt when traveling in a car. Always wear a helmet when riding a bicycle or motorcycle.

## 2022-01-05 NOTE — PROGRESS NOTES
Medicare Annual Wellness Visit  Name: Josseline Mishra Date: 2022   MRN: [id number] Sex: Female   Age: 64 y.o. Ethnicity: Non- / Non    : 1945 Race: White (non-)      Chelsi Martin is here for Medicare AWV (mammo as per dr Tomy Babinski)    Screenings for behavioral, psychosocial and functional/safety risks, and cognitive dysfunction are all negative except as indicated below. These results, as well as other patient data from the [address] form, are documented in Flowsheets linked to this Encounter. No Known Allergies    Prior to Visit Medications    Medication Sig Taking? Authorizing Provider   Coenzyme Q10 (COQ10) 100 MG CAPS Take by mouth daily Yes Historical Provider, MD   levothyroxine (EUTHYROX) 75 MCG tablet Take 1 tablet by mouth once daily Yes Dez Rae MD   rosuvastatin (CRESTOR) 10 MG tablet Take 1 tablet by mouth daily Yes Dez Rae MD   albuterol sulfate HFA (VENTOLIN HFA) 108 (90 Base) MCG/ACT inhaler Inhale 2 puffs into the lungs 4 times daily as needed for Wheezing Yes Dez Rae MD   ELIQUIS 5 MG TABS tablet TAKE 1 TABLET BY MOUTH TWICE DAILY Yes Historical Provider, MD   PACERONE 200 MG tablet TAKE 1 TABLET BY MOUTH ONCE DAILY Yes Historical Provider, MD   vitamin B-12 (CYANOCOBALAMIN) 100 MCG tablet Take 50 mcg by mouth daily Yes Historical Provider, MD   latanoprost (XALATAN) 0.005 % ophthalmic solution Place 1 drop into both eyes nightly  Yes Historical Provider, MD   Vitamin D (CHOLECALCIFEROL) 1000 UNITS CAPS capsule Take 2,000 Units by mouth daily  Yes Historical Provider, MD   aspirin 81 MG tablet Take 81 mg by mouth daily.  Yes Historical Provider, MD       Past Medical History:   Diagnosis Date    Breast cancer [facility] 2014    right    GERD (gastroesophageal reflux disease)     Hyperlipidemia     Thyroid disease     Wears glasses        Past Surgical History:   Procedure Laterality Date    BREAST SURGERY Bilateral 1985 [facility]IDS [facility]OVED FROM [facility] BREAST    BREAST SURGERY Right 1/7/14    R partial mastectomy    BREAST SURGERY Right 1/22/14    Rt JUAN R cath insertion    EYE SURGERY      cataracts    MELVINA AND BSO         Family History   Problem Relation Age of Onset    Cancer Mother         throat    Cancer Maternal Grandmother         ovarian    Parkinsonism Father     Breast Cancer Neg Hx        CareTeam (Including outside providers/suppliers regularly involved in providing care):   Patient Care Team:  Alecia Escobar MD as PCP - General (Internal Medicine)  Alecia Escobar MD as PCP - [facility] Empaneled Provider  Maria Isabel Moore (Radiology)  Yobani Claire MD as Consulting Physician (Neurology)    Wt Readings from Last 3 Encounters:   01/05/22 197 lb (89.4 kg)   09/03/21 192 lb (87.1 kg)   08/12/21 192 lb (87.1 kg)     Vitals:    01/05/22 0952   BP: 128/88   Site: Left Upper Arm   Position: Sitting   Cuff Size: Large Adult   Pulse: 82   Resp: 18   SpO2: 98%   Weight: 197 lb (89.4 kg)   Height: 5' 5\" (1.651 m)     Body mass index is 32.78 kg/m². Based upon direct observation of the patient, evaluation of cognition reveals recent and remote memory intact. Patient's complete Health Risk Assessment and screening values have been reviewed and are found in Flowsheets. The following problems were reviewed today and where indicated follow up appointments were made and/or referrals ordered. Positive Risk Factor Screenings with Interventions:            General Health and ACP:  General  In general, how would you say your health is?: Good  In the past 7 days, have you experienced any of the following?  New or Increased Pain, New or Increased Fatigue, Loneliness, Social Isolation, Stress or Anger?: None of These  Do you get the social and emotional support that you need?: Yes  Do you have a Living Will?: Yes  Advance Directives     Power of  Living Will ACP-Advance Directive ACP-Power of     Not on File Not on File Not on File Not on File      General Health Risk Interventions:  Health Habits/Nutrition:  Health Habits/Nutrition  Do you exercise for at least 20 minutes 2-3 times per week?: (!) No  Have you lost any weight without trying in the past 3 months?: No  Do you eat only one meal per day?: No  Have you seen the dentist within the past year?: Yes  Body mass index: (!) 32.78  Health Habits/Nutrition Interventions:  · Inadequate physical activity:  patient agrees to exercise for at least 150 minutes/week         Personalized Preventive Plan   Current Health Maintenance Status  Immunization History   Administered Date(s) Administered    COVID-19, Jonathan Briscoe, Primary or Immunocompromised, PF, 100mcg/0.5mL 07/24/2021, 08/14/2021    Pneumococcal Conjugate 13-valent ([id number]) 12/08/2017    Pneumococcal Polysaccharide ([id number]) 12/12/2018    Zoster Live (Zostavax) 10/18/2012        Health Maintenance   Topic Date Due    Depression Screen  Never done    Annual Wellness Visit (AWV)  01/05/2022    DTaP/Tdap/Td vaccine (1 - Tdap) 01/26/2022 (Originally 9/28/1964)    Flu vaccine (1) 01/05/2023 (Originally 9/1/2021)    Shingles Vaccine (2 of 3) 01/05/2023 (Originally 12/13/2012)    COVID-19 Vaccine (3 - Booster) 02/14/2022    TSH testing  07/06/2022    Lipid screen  01/05/2023    DEXA (modify frequency per FRAX score)  Completed    Pneumococcal 65+ years Vaccine  Completed    Hepatitis C screen  Completed    Hepatitis A vaccine  Aged Out    Hepatitis B vaccine  Aged Out    Hib vaccine  Aged Out    Meningococcal (ACWY) vaccine  Aged Out     Recommendations for [employer] Due: see orders and patient instructions/AVS.  . Recommended screening schedule for the next 5-10 years is provided to the patient in written form: see Patient Instructions/AVS.  MEDICARE WELLNESS SCREENING/ MAINTENANCE:  1. MAMMOGRAM:As per Dr. Aadm Omer  2.  SCREENING CSCOPE 2011, repeat 10 years  Patient now over 74  Colonoscopy 2011 normal    3. BONE DENSITY SCREENIN normal, repeat 5 years  4. PAP SCREENING:na  5. DIABETES SCREEN - FBS DONE/ ABOVE LABS  6. CARDIOVASCULAR SCREENING- LIPID PANEL  DONE/ ABOVE LABS  7. PNEUMONIA VACCINATION- prevanr 2017/ PPSV 23   8. INFLUENZA VACCINATION: TO BE DONE IN FALL- refuses  9. HEP B VACCINATION- PT DECLINES  10. HIV/ HEP SCREENING- NA  11. OPTOMETRY/OPTHALMOLOGY APPOINTMENT SUGGESTED FOR GLAUCOMA SCREENING has appt to see dr Bales [place]:  1. HEALTHY DIET:lower insaturated fats and free sugars  2. EXERCISE suggest at least 30-50 min dialy  3. NO INCREASED FALL RISK ON EXAM     Patient Instructions     Personalized Preventive Plan for Haven Gretel - 2022  Medicare offers a range of preventive health benefits. Some of the tests and screenings are paid in full while other may be subject to a deductible, co-insurance, and/or copay. Some of these benefits include a comprehensive review of your medical history including lifestyle, illnesses that may run in your family, and various assessments and screenings as appropriate. After reviewing your medical record and screening and assessments performed today your provider may have ordered immunizations, labs, imaging, and/or referrals for you. A list of these orders (if applicable) as well as your Preventive Care list are included within your After Visit Summary for your review. Other Preventive Recommendations:    · A preventive eye exam performed by an eye specialist is recommended every 1-2 years to screen for glaucoma; cataracts, macular degeneration, and other eye disorders. · A preventive dental visit is recommended every 6 months. · Try to get at least 150 minutes of exercise per week or 10,000 steps per day on a pedometer . · Order or download the FREE \"Exercise & Physical Activity: Your Everyday Guide\" from [employer].  Call 2-144.786.7926 or search [employer] online. · You need 1564-4386 mg of calcium and 9699-0047 IU of vitamin D per day. It is possible to meet your calcium requirement with diet alone, but a vitamin D supplement is usually necessary to meet this goal.  · When exposed to the sun, use a sunscreen that protects against both UVA and UVB radiation with an SPF of 30 or greater. Reapply every 2 to 3 hours or after sweating, drying off with a towel, or swimming. · Always wear a seat belt when traveling in a car. Always wear a helmet when riding a bicycle or motorcycle.

## 2022-01-05 NOTE — PROGRESS NOTES
Chief Complaint   Patient presents with     Multiple medical problems     mammo as per dr Mela Boateng     History of presenting illness:  Randee Majano is a64 y.o. female who presents today for follow up on her chronic medical conditions as noted below.     Patient Active Problem List    Diagnosis Date Noted    Acute CVA (cerebrovascular accident) ([facility] 06/03/2021    GERD (gastroesophageal reflux disease)     Non-rheumatic mitral regurgitation 11/16/2017     Overview Note:     2014 mil/ mod      Pure hypercholesterolemia 11/16/2017    IFG (impaired fasting glucose) 11/16/2017    Acquired hypothyroidism 11/16/2017    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    B12 deficiency 11/16/2017    Vitamin D deficiency 11/16/2017    PAF (paroxysmal atrial fibrillation) ([facility] 09/07/2014     Overview Note:     9/6/2014  DCCV in ED  09/07/2014  Echo  Normal LVFX  09/07/2014  lexiscan negative for myocardial ischemia, EF 62 %            Personal history of breast cancer 08/20/2014    S/P lumpectomy, right breast 1/7/14 02/12/2014    Breast cancer ([facility] 01/2014     Overview Note:     right      Malignant neoplasm of other specified sites of female breast 12/09/2013     Past Medical History:   Diagnosis Date    Breast cancer ([facility] 1/2014    right    GERD (gastroesophageal reflux disease)     Hyperlipidemia     Thyroid disease     Wears glasses       Past Surgical History:   Procedure Laterality Date    BREAST SURGERY Bilateral 1985    FIBROIDS REMOVED FROM EACH BREAST    BREAST SURGERY Right 1/7/14    R partial mastectomy    BREAST SURGERY Right 1/22/14    Rt JUAN R cath insertion    EYE SURGERY      cataracts    MELVINA AND BSO       Current Outpatient Medications   Medication Sig Dispense Refill    Coenzyme Q10 (COQ10) 100 MG CAPS Take by mouth daily      levothyroxine (EUTHYROX) 75 MCG tablet Take 1 tablet by mouth once daily 90 tablet 0    rosuvastatin (CRESTOR) 10 MG tablet Take 1 tablet by mouth daily 90 tablet 1  albuterol sulfate HFA (VENTOLIN HFA) 108 (90 Base) MCG/ACT inhaler Inhale 2 puffs into the lungs 4 times daily as needed for Wheezing 18 g 0    ELIQUIS 5 MG TABS tablet TAKE 1 TABLET BY MOUTH TWICE DAILY      PACERONE 200 MG tablet TAKE 1 TABLET BY MOUTH ONCE DAILY      vitamin B-12 (CYANOCOBALAMIN) 100 MCG tablet Take 50 mcg by mouth daily      latanoprost (XALATAN) 0.005 % ophthalmic solution Place 1 drop into both eyes nightly       Vitamin D (CHOLECALCIFEROL) 1000 UNITS CAPS capsule Take 2,000 Units by mouth daily       aspirin 81 MG tablet Take 81 mg by mouth daily. No current facility-administered medications for this visit. No Known Allergies  Social History     Tobacco Use    Smoking status: Never Smoker    Smokeless tobacco: Never Used   Substance Use Topics    Alcohol use: No      Family History   Problem Relation Age of Onset    Cancer Mother         throat    Cancer Maternal Grandmother         ovarian    Parkinsonism Father     Breast Cancer Neg Hx        Review of Systems   Constitutional: Positive for fatigue. Negative for chills and fever. HENT: Negative for congestion, ear pain, nosebleeds, postnasal drip and sore throat. Respiratory: Negative for cough, chest tightness and wheezing. Cardiovascular: Negative for chest pain, palpitations and leg swelling. Gastrointestinal: Negative for abdominal pain and constipation. Genitourinary: Negative for dysuria and urgency. Musculoskeletal: Positive for arthralgias and gait problem. Skin: Negative for rash. Neurological: Negative for dizziness and headaches. Psychiatric/Behavioral: Negative. Vitals:    01/05/22 0952   BP: 128/88   Site: Left Upper Arm   Position: Sitting   Cuff Size: Large Adult   Pulse: 82   Resp: 18   SpO2: 98%   Weight: 197 lb (89.4 kg)   Height: 5' 5\" (1.651 m)     Body mass index is 32.78 kg/m². Physical Exam  Constitutional:       Appearance: She is well-developed.    HENT: Absolute 01/05/2022 0.30     Basophils Absolute 01/05/2022 0.10     Vit D, 25-Hydroxy 01/05/2022 31.0     Cholesterol, Total 01/05/2022 169     Triglycerides 01/05/2022 128     HDL 01/05/2022 58*    LDL Calculated 01/05/2022 85     Hemoglobin A1C 01/05/2022 5.7     Sodium 01/05/2022 144     Potassium 01/05/2022 4.3     Chloride 01/05/2022 107     CO2 01/05/2022 25     Anion Gap 01/05/2022 12     Glucose 01/05/2022 95     BUN 01/05/2022 12     CREATININE 01/05/2022 0.8     GFR Non- 01/05/2022 >60     GFR  01/05/2022 >59     Calcium 01/05/2022 9.9     Total Protein 01/05/2022 7.0     Albumin 01/05/2022 4.2     Total Bilirubin 01/05/2022 0.8     Alkaline Phosphatase 01/05/2022 81     ALT 01/05/2022 23     AST 01/05/2022 24            ASSESSMENT/PLAN:      Pure hypercholesterolemia-   I have personally reviewed and interpreted these lab results and thoroughly discussed with patient    LDL is 85 in 1/2022   Was off statin since 2019 but restarted late summer 2021  Rx Crestor 10 mg p.o. daily  Healthy, mostly fiber rich nonstarchy plant-based diet recommended  Recommend to decrease intake of processed foods, simple carbohydrates and animal-based products that high in saturated fats     C erebrovascular disease  Post CVA 2021  No agreeable to statin as above  Rx Crestor 10 mg p.o. daily  Rx aspirin 81 mg     PAF  Remains on Eliquis 5 mg and testerone and follows with cardiology    Acquired hypothyroidism-her thyroid levels =  Results reviewed + dw pt  RX Synthroid 75 µg daily-      B12 Now BV 242 ( 458)(826)  Lab discussed with patient  RX 1 MG B12 DAILY PO     IFG (impaired fasting glucose)  OBESITY  = Hemoglobin A1c is higher at 5.7 in 1/2022  strict lower carbohydrate diet and weight loss is recommended  Pt was given approximately 5 minutes of counseling about diet and exercise including education on what calories are, where calories come from, the need for portion control,following lower carbohydrate dietary regimen and healthy snacks along side an active lifestyle with supplementary exercise of approx 30 minutes a week, 5 days a week of exercise for weight loss.  The patient voiced increased understanding of the topics discussed.       PAF (paroxysmal atrial fibrillation) ([facility]- no further episdoes  Monitor closely      Vitamin D deficiency-   level is low, 31 in 1/2022   RX vit D increase to  4000 iu daily  Lab reviewed with pt     Exogenous obesity  Pt was given approximately 5 minutes of counseling about diet and exercise including education on what calories are, where calories come from, the need for portion control,following lower carbohydrate dietary regimen and healthy snacks along side an active lifestyle with supplementary exercise of approx 30 minutes a week, 5 days a week of exercise for weight loss.  The patient voiced increased understanding of the topics discussed.       Orders Placed This Encounter   Procedures    Lipid Panel    Hemoglobin A1C    Comprehensive Metabolic Panel    Vitamin D 25 Hydroxy    TSH without Reflex     New Prescriptions    No medications on file         Return for Medicare Annual Wellness Visit in 1 year. EMR Dragon/transcription disclaimer:Significant part of this  encounter note is electronic transcription/translationof spoken language to printed text. The electronic translation of spoken language may be erroneous, or at times, nonsensical words or phrases may be inadvertently transcribed.  Although I have reviewed the note for sucherrors, some may still exist.

## 2022-01-10 RX ORDER — LEVOTHYROXINE SODIUM 0.07 MG/1
TABLET ORAL
Qty: 90 TABLET | Refills: 1 | Status: SHIPPED | OUTPATIENT
Start: 2022-01-10 | End: 2022-07-15

## 2022-01-10 NOTE — TELEPHONE ENCOUNTER
Last Appointment Date: 1/5/2022  Next Appointment Date: 5/5/2022    No Known Allergies    Patient needs refill on   Requested Prescriptions     Pending Prescriptions Disp Refills    levothyroxine (EUTHYROX) 75 MCG tablet [Pharmacy Med Name: Euthyrox 75 MCG Oral Tablet] 90 tablet 0     Sig: Take 1 tablet by mouth once daily

## 2022-02-14 ENCOUNTER — OFFICE VISIT (OUTPATIENT)
Dept: NEUROLOGY | Age: 77
End: 2022-02-14
Payer: MEDICARE

## 2022-02-14 VITALS
DIASTOLIC BLOOD PRESSURE: 84 MMHG | HEIGHT: 65 IN | OXYGEN SATURATION: 97 % | WEIGHT: 195 LBS | SYSTOLIC BLOOD PRESSURE: 138 MMHG | BODY MASS INDEX: 32.49 KG/M2 | HEART RATE: 70 BPM

## 2022-02-14 DIAGNOSIS — I48.0 PAROXYSMAL ATRIAL FIBRILLATION (HCC): ICD-10-CM

## 2022-02-14 DIAGNOSIS — G56.03 BILATERAL CARPAL TUNNEL SYNDROME: ICD-10-CM

## 2022-02-14 DIAGNOSIS — G47.33 SLEEP APNEA, OBSTRUCTIVE: ICD-10-CM

## 2022-02-14 DIAGNOSIS — I63.411 CEREBROVASCULAR ACCIDENT (CVA) DUE TO EMBOLISM OF RIGHT MIDDLE CEREBRAL ARTERY (HCC): Primary | ICD-10-CM

## 2022-02-14 PROCEDURE — G8399 PT W/DXA RESULTS DOCUMENT: HCPCS | Performed by: PSYCHIATRY & NEUROLOGY

## 2022-02-14 PROCEDURE — 1036F TOBACCO NON-USER: CPT | Performed by: PSYCHIATRY & NEUROLOGY

## 2022-02-14 PROCEDURE — G8427 DOCREV CUR MEDS BY ELIG CLIN: HCPCS | Performed by: PSYCHIATRY & NEUROLOGY

## 2022-02-14 PROCEDURE — 1090F PRES/ABSN URINE INCON ASSESS: CPT | Performed by: PSYCHIATRY & NEUROLOGY

## 2022-02-14 PROCEDURE — 1123F ACP DISCUSS/DSCN MKR DOCD: CPT | Performed by: PSYCHIATRY & NEUROLOGY

## 2022-02-14 PROCEDURE — 99214 OFFICE O/P EST MOD 30 MIN: CPT | Performed by: PSYCHIATRY & NEUROLOGY

## 2022-02-14 PROCEDURE — G8484 FLU IMMUNIZE NO ADMIN: HCPCS | Performed by: PSYCHIATRY & NEUROLOGY

## 2022-02-14 PROCEDURE — G8417 CALC BMI ABV UP PARAM F/U: HCPCS | Performed by: PSYCHIATRY & NEUROLOGY

## 2022-02-14 PROCEDURE — 4040F PNEUMOC VAC/ADMIN/RCVD: CPT | Performed by: PSYCHIATRY & NEUROLOGY

## 2022-02-14 NOTE — PROGRESS NOTES
[facility]  [address] [place] [address] Drive, [address]  [place], [place] [address]  Phone [phone]  Fax [phone]     [facility] Follow Up Encounter  22 3:43 PM CST    Information:   Patient Name: Maximo Dye  :   1945  Age:   64 y.o. MRN:   [id number]  Account #:  [de-identified]  Today:  22    Provider: Mark Aguilar M.D. Chief Complaint:   Chief Complaint   Patient presents with    6 Month Follow-Up     Cerebrovascular accident (CVA) due to embolism of right middle cerebral artery        Subjective:   Maxiom Dye is a 64 y.o. woman with a history of hypertension, hyperlipidemia, and stroke who is following up. She has recovered well. Her left sided strength is pretty much back to normal.  She has persistent numbness and tingling in the left hand. She relates that she had some numbness and tingling in her hands bilaterally prior to the stroke. She is an artist and uses colored pencils. The left hand numbness is aggravating. She is independent. She is ambulating without a cane or walker. She remains on Eliquis. She has had no palpitations. She relates that she is not sleeping well. She snores. She wakens frequently. She does not rest well and is drowsy during the daytime. She often does nap. Objective:     Past Medical History:  Past Medical History:   Diagnosis Date    Breast cancer ([address] 2014    right    GERD (gastroesophageal reflux disease)     Hyperlipidemia     Thyroid disease     Wears glasses        Past Surgical History:   Procedure Laterality Date    BREAST SURGERY Bilateral     FIBROIDS REMOVED FROM [facility] BREAST    BREAST SURGERY Right 14    R partial mastectomy    BREAST SURGERY Right 14    Rt JUAN R cath insertion    EYE SURGERY      cataracts    MELVINA AND BSO         Recent Hospitalizations  · None    Significant Injuries  · None    Habits  Maximo Dye reports that she has never smoked.  She has never used smokeless tobacco. She reports that she does not drink alcohol and does not use drugs. Family History   Problem Relation Age of Onset    Cancer Mother         throat    Cancer Maternal Grandmother         ovarian    Parkinsonism Father     Breast Cancer Neg Hx        Social History  Jenise Arreguin , lives [facility], and is retired. Medications:  Current Outpatient Medications   Medication Sig Dispense Refill    levothyroxine (EUTHYROX) 75 MCG tablet Take 1 tablet by mouth once daily 90 tablet 1    Coenzyme Q10 (COQ10) 100 MG CAPS Take by mouth daily      rosuvastatin (CRESTOR) 10 MG tablet Take 1 tablet by mouth daily 90 tablet 1    albuterol sulfate HFA (VENTOLIN HFA) 108 (90 Base) MCG/ACT inhaler Inhale 2 puffs into the lungs 4 times daily as needed for Wheezing 18 g 0    ELIQUIS 5 MG TABS tablet TAKE 1 TABLET BY MOUTH TWICE DAILY      PACERONE 200 MG tablet TAKE 1 TABLET BY MOUTH ONCE DAILY      vitamin B-12 (CYANOCOBALAMIN) 100 MCG tablet Take 50 mcg by mouth daily      latanoprost (XALATAN) 0.005 % ophthalmic solution Place 1 drop into both eyes nightly       Vitamin D (CHOLECALCIFEROL) 1000 UNITS CAPS capsule Take 2,000 Units by mouth daily       aspirin 81 MG tablet Take 81 mg by mouth daily. No current facility-administered medications for this visit. Allergies:   Allergies as of 02/14/2022    (No Known Allergies)       Review of Systems:  Constitutional: negative for - chills and fever  Eyes:  negative for - visual disturbance and photophobia  HENMT: negative for - headaches and sinus pain  Respiratory: negative for - cough, hemoptysis, and shortness of breath  Cardiovascular: negative for - chest pain and palpitations  Gastrointestinal: negative for - blood in stools, constipation, diarrhea, nausea, and vomiting  Genito-Urinary: negative for - hematuria, urinary frequency, urinary urgency, and urinary retention  Musculoskeletal: negative for - joint pain, joint stiffness, and reflexes are intact and symmetrical in both upper and lower extremities. Gibson's signs are absent bilaterally. There is no ankle clonus on either side. Sensation:  Sensation is intact to light touch, temperature, and vibration in all extremities. Tinel's signs are positive at the wrists. Coordination:  Rapid alternating movements are normal in both upper and lower extremities. Finger to nose testing is unimpaired bilaterally. Gait:  Normal station and gait. Assessment:       ICD-10-CM    1. Cerebrovascular accident (CVA) due to embolism of right middle cerebral artery ([facility]  I63.411    2. Paroxysmal atrial fibrillation (HCC)  I48.0    3. Bilateral carpal tunnel syndrome  G56.03 Nerve Conduction Test with EMG   4. Sleep apnea, obstructive  G47.33 Baseline Diagnostic Sleep Study     Ambulatory referral to Sleep Medicine   She has recovered well from her stroke. Given her age, she would be a good candidate for a Watchman device. I discussed this with her. She has symptoms that suggest sleep apnea which can make Afib worse and more symptomatic. Plan:   1. Will arrange a nerve conduction study and electromyogram to assess for carpal tunnel syndrome  2. Will arrange a sleep study to assess for sleep apnea  3.  Consider placement of a Watchman device for the Afib    Electronically signed by Radha Wilcox MD on 2/14/22

## 2022-02-14 NOTE — PATIENT INSTRUCTIONS
INSTRUCTIONS:  1. Will arrange a nerve conduction study and electromyogram to assess for carpal tunnel syndrome  2. Will arrange a sleep study to assess for sleep apnea  3.  Consider placement of a Watchman device for the Afib

## 2022-02-23 ENCOUNTER — HOSPITAL ENCOUNTER (OUTPATIENT)
Dept: NEUROLOGY | Age: 77
Discharge: HOME | End: 2022-02-23
Payer: MEDICARE

## 2022-02-23 DIAGNOSIS — G56.03 BILATERAL CARPAL TUNNEL SYNDROME: ICD-10-CM

## 2022-02-23 DIAGNOSIS — G56.03 BILATERAL CARPAL TUNNEL SYNDROME: Primary | ICD-10-CM

## 2022-02-23 PROCEDURE — 95886 MUSC TEST DONE W/N TEST COMP: CPT

## 2022-02-23 PROCEDURE — 95911 NRV CNDJ TEST 9-10 STUDIES: CPT

## 2022-02-24 PROCEDURE — 95911 NRV CNDJ TEST 9-10 STUDIES: CPT | Performed by: PSYCHIATRY & NEUROLOGY

## 2022-02-24 PROCEDURE — 95886 MUSC TEST DONE W/N TEST COMP: CPT | Performed by: PSYCHIATRY & NEUROLOGY

## 2022-02-28 ENCOUNTER — TELEPHONE (OUTPATIENT)
Dept: INTERNAL MEDICINE | Age: 77
End: 2022-02-28

## 2022-02-28 NOTE — TELEPHONE ENCOUNTER
Reginal Galeazzi, MD [place], [place]  Nerve conduction study shows moderate carpal tunnel syndrome bilaterally  Referral is recommended  Dr. Nasim Sanchez, hand surgery or patient preference    SPoke to pt concerning this. PT states that she has been set up with Dr. Tu Abdalla office already.

## 2022-03-04 ENCOUNTER — TELEPHONE (OUTPATIENT)
Dept: INTERNAL MEDICINE | Age: 77
End: 2022-03-04

## 2022-03-04 DIAGNOSIS — Z12.31 ENCOUNTER FOR SCREENING MAMMOGRAM FOR MALIGNANT NEOPLASM OF BREAST: Primary | ICD-10-CM

## 2022-03-04 NOTE — TELEPHONE ENCOUNTER
Patient called 's office asking for a mammo order. She has not been seen there in 3-4 years. Flo Bartlett left a vm asking us to drop an order for mammogram. Rafael Zamoraist back to let her know I spoke with the patient and ordered the mammo.

## 2022-03-08 ENCOUNTER — TELEPHONE (OUTPATIENT)
Dept: INTERNAL MEDICINE | Age: 77
End: 2022-03-08

## 2022-03-08 DIAGNOSIS — I63.9 ACUTE CVA (CEREBROVASCULAR ACCIDENT) (HCC): ICD-10-CM

## 2022-03-08 DIAGNOSIS — I48.0 PAF (PAROXYSMAL ATRIAL FIBRILLATION) (HCC): Primary | ICD-10-CM

## 2022-03-08 NOTE — TELEPHONE ENCOUNTER
S/w pt, states Eliquis was prescribed by Dr. Adien Sheikh. She is having trouble getting them to refill it and would like to know if Dr. Kulwinder Biggs would. Pt states they also suggested she get a referral to a cardiologist in [place] because Dr. Ria Conde only comes to [place] once a month.

## 2022-03-17 ENCOUNTER — HOSPITAL ENCOUNTER (OUTPATIENT)
Dept: WOMENS IMAGING | Age: 77
Discharge: HOME | End: 2022-03-17
Payer: MEDICARE

## 2022-03-17 DIAGNOSIS — Z12.31 ENCOUNTER FOR SCREENING MAMMOGRAM FOR MALIGNANT NEOPLASM OF BREAST: ICD-10-CM

## 2022-03-17 PROCEDURE — 77063 BREAST TOMOSYNTHESIS BI: CPT

## 2022-03-22 ENCOUNTER — OFFICE VISIT (OUTPATIENT)
Dept: CARDIOLOGY CLINIC | Age: 77
End: 2022-03-22
Payer: MEDICARE

## 2022-03-22 VITALS
OXYGEN SATURATION: 97 % | HEIGHT: 65 IN | HEART RATE: 69 BPM | WEIGHT: 202 LBS | BODY MASS INDEX: 33.66 KG/M2 | DIASTOLIC BLOOD PRESSURE: 88 MMHG | SYSTOLIC BLOOD PRESSURE: 126 MMHG

## 2022-03-22 DIAGNOSIS — I48.0 PAF (PAROXYSMAL ATRIAL FIBRILLATION) (HCC): Primary | ICD-10-CM

## 2022-03-22 PROCEDURE — 1036F TOBACCO NON-USER: CPT | Performed by: INTERNAL MEDICINE

## 2022-03-22 PROCEDURE — 4040F PNEUMOC VAC/ADMIN/RCVD: CPT | Performed by: INTERNAL MEDICINE

## 2022-03-22 PROCEDURE — 1123F ACP DISCUSS/DSCN MKR DOCD: CPT | Performed by: INTERNAL MEDICINE

## 2022-03-22 PROCEDURE — 99204 OFFICE O/P NEW MOD 45 MIN: CPT | Performed by: INTERNAL MEDICINE

## 2022-03-22 PROCEDURE — G8428 CUR MEDS NOT DOCUMENT: HCPCS | Performed by: INTERNAL MEDICINE

## 2022-03-22 PROCEDURE — 1090F PRES/ABSN URINE INCON ASSESS: CPT | Performed by: INTERNAL MEDICINE

## 2022-03-22 PROCEDURE — G8417 CALC BMI ABV UP PARAM F/U: HCPCS | Performed by: INTERNAL MEDICINE

## 2022-03-22 PROCEDURE — G8484 FLU IMMUNIZE NO ADMIN: HCPCS | Performed by: INTERNAL MEDICINE

## 2022-03-22 PROCEDURE — G8399 PT W/DXA RESULTS DOCUMENT: HCPCS | Performed by: INTERNAL MEDICINE

## 2022-03-22 PROCEDURE — 93000 ELECTROCARDIOGRAM COMPLETE: CPT | Performed by: INTERNAL MEDICINE

## 2022-03-22 RX ORDER — METOPROLOL SUCCINATE 25 MG/1
25 TABLET, EXTENDED RELEASE ORAL DAILY
COMMUNITY
Start: 2022-03-04 | End: 2022-03-22 | Stop reason: SDUPTHER

## 2022-03-22 RX ORDER — METOPROLOL SUCCINATE 25 MG/1
25 TABLET, EXTENDED RELEASE ORAL DAILY
Qty: 90 TABLET | Refills: 2 | Status: SHIPPED | OUTPATIENT
Start: 2022-03-22 | End: 2022-04-01 | Stop reason: SDUPTHER

## 2022-03-22 RX ORDER — FLECAINIDE ACETATE 150 MG/1
150 TABLET ORAL 2 TIMES DAILY
Qty: 180 TABLET | Refills: 2 | Status: SHIPPED | OUTPATIENT
Start: 2022-03-22 | End: 2022-10-13 | Stop reason: SDUPTHER

## 2022-03-22 RX ORDER — APIXABAN 5 MG/1
5 TABLET, FILM COATED ORAL 2 TIMES DAILY
Qty: 180 TABLET | Refills: 2 | Status: SHIPPED | OUTPATIENT
Start: 2022-03-22 | End: 2022-09-26

## 2022-03-22 RX ORDER — FLECAINIDE ACETATE 150 MG/1
150 TABLET ORAL 2 TIMES DAILY
COMMUNITY
Start: 2022-02-15 | End: 2022-03-22 | Stop reason: SDUPTHER

## 2022-03-22 ASSESSMENT — ENCOUNTER SYMPTOMS
SORE THROAT: 0
VOMITING: 0
SHORTNESS OF BREATH: 0
ABDOMINAL PAIN: 0
EYE DISCHARGE: 0
EYE PAIN: 0
CHEST TIGHTNESS: 0
BLOOD IN STOOL: 0
FACIAL SWELLING: 0
WHEEZING: 0
CONSTIPATION: 0
APNEA: 0
NAUSEA: 0
ABDOMINAL DISTENTION: 0
COUGH: 0
EYE REDNESS: 0
DIARRHEA: 0

## 2022-03-22 NOTE — PROGRESS NOTES
Cardiology Office Visit Note  [address]  [id number]  Phone: [phone]  Fax: [phone]                            Date:  3/22/2022  Patient: Jimena Garvey  Age:  64 y.o., 1945    Referral: Tristan Brenner MD    REASON FOR VISIT:  New Patient  Atrial fibrillation  CVA      PROBLEM LIST:    Patient Active Problem List    Diagnosis Date Noted    Acute CVA (cerebrovascular accident) ([facility] [address] 06/03/2021     Priority: Low    GERD (gastroesophageal reflux disease)      Priority: Low    Non-rheumatic mitral regurgitation 11/16/2017     Priority: Low     Overview Note:     2014 mil/ mod      Pure hypercholesterolemia 11/16/2017     Priority: Low    IFG (impaired fasting glucose) 11/16/2017     Priority: Low    Acquired hypothyroidism 11/16/2017     Priority: Low    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     Priority: Low    B12 deficiency 11/16/2017     Priority: Low    Vitamin D deficiency 11/16/2017     Priority: Low    PAF (paroxysmal atrial fibrillation) ([facility] 09/07/2014     Priority: Low     Overview Note:     9/6/2014  DCCV in ED  09/07/2014  Echo  Normal LVFX  09/07/2014  lexiscan negative for myocardial ischemia, EF 62 %            Personal history of breast cancer 08/20/2014     Priority: Low    S/P lumpectomy, right breast 1/7/14 02/12/2014     Priority: Low    Breast cancer ([facility] 01/2014     Priority: Low     Overview Note:     right      Malignant neoplasm of other specified sites of female breast 12/09/2013     Priority: Low       PRESENTATION: Jimena Garvey is a 64y.o. year old female seen in cardiology consultation today for further evaluation of atrial fibrillation. Patient reports that she was diagnosed with atrial fibrillation many years ago and at that time had shock therapy which was successful (9/2014) without mention of recurrences. She was not on anticoagulation following that episode. She has been on aspirin daily.   Summer last year she unfortunately had a cerebrovascular accident with persistent but improving left-sided numbness. While hospitalized it does not appear that if she had any documented episodes of recurrent atrial fibrillation. She was discharged home with a Zio patch which was reviewed and also negative for atrial fibrillation for the period of time monitored. Her EKG today shows maintenance of sinus rhythm. She is currently on aspirin, Eliquis, flecainide and metoprolol--apparently all newly started. Notably, she has no history of established coronary artery disease. No reports of chest pain at this time. No shortness of breath, diaphoresis, palpitations or syncope. She has not had any bleeding complications. She has had 2 falls related to gait instability. She has since started using her cane without further falls. REVIEW OF SYSTEMS:  Review of Systems   Constitutional: Negative for chills, fatigue and fever. HENT: Negative for congestion, facial swelling, hearing loss and sore throat. Eyes: Negative for pain, discharge, redness and visual disturbance. Respiratory: Negative for apnea, cough, chest tightness, shortness of breath and wheezing. Cardiovascular: Negative for chest pain, palpitations and leg swelling. Gastrointestinal: Negative for abdominal distention, abdominal pain, blood in stool, constipation, diarrhea, nausea and vomiting. Endocrine: Negative for polydipsia, polyphagia and polyuria. Genitourinary: Negative for dysuria, flank pain, frequency and hematuria. Musculoskeletal: Negative for joint swelling, myalgias and neck pain. Skin: Negative for pallor and rash. Neurological: Negative for dizziness, syncope, speech difficulty, light-headedness, numbness and headaches. Psychiatric/Behavioral: Negative for confusion, hallucinations and sleep disturbance.        Past Medical History:      Diagnosis Date    [facility] cancer ([facility] 1/2014    right    GERD (gastroesophageal reflux disease)  History of therapeutic radiation     Hyperlipidemia     Thyroid disease     Wears glasses        Past Surgical History:      Procedure Laterality Date    BREAST SURGERY Bilateral 1985    FIBROIDS REMOVED FROM EACH BREAST    BREAST SURGERY Right 1/7/14    R partial mastectomy    BREAST SURGERY Right 1/22/14    Rt JUAN R cath insertion    EYE SURGERY      cataracts    HYSTERECTOMY      MELVINA AND BSO         Medications:  Current Outpatient Medications   Medication Sig Dispense Refill    metoprolol succinate (TOPROL XL) 25 MG extended release tablet Take 1 tablet by mouth daily 1/2 tab daily 90 tablet 2    ELIQUIS 5 MG TABS tablet Take 1 tablet by mouth 2 times daily 180 tablet 2    flecainide (TAMBOCOR) 150 MG tablet Take 1 tablet by mouth in the morning and at bedtime 180 tablet 2    levothyroxine (EUTHYROX) 75 MCG tablet Take 1 tablet by mouth once daily 90 tablet 1    Coenzyme Q10 (COQ10) 100 MG CAPS Take by mouth daily      rosuvastatin (CRESTOR) 10 MG tablet Take 1 tablet by mouth daily 90 tablet 1    albuterol sulfate HFA (VENTOLIN HFA) 108 (90 Base) MCG/ACT inhaler Inhale 2 puffs into the lungs 4 times daily as needed for Wheezing 18 g 0    vitamin B-12 (CYANOCOBALAMIN) 100 MCG tablet Take 50 mcg by mouth daily      latanoprost (XALATAN) 0.005 % ophthalmic solution Place 1 drop into both eyes nightly       Vitamin D (CHOLECALCIFEROL) 1000 UNITS CAPS capsule Take 2,000 Units by mouth daily       aspirin 81 MG tablet Take 81 mg by mouth daily. No current facility-administered medications for this visit. Allergies:  Patient has no known allergies.     Social History:  Social History     Occupational History    Not on file   Tobacco Use    Smoking status: Never Smoker    Smokeless tobacco: Never Used   Vaping Use    Vaping Use: Never used   Substance and Sexual Activity    Alcohol use: No    Drug use: No    Sexual activity: Never         Family History:       Problem Relation Age of Onset    Cancer Mother         throat    Cancer Maternal Grandmother         ovarian    Ovarian Cancer Maternal Grandmother     Parkinsonism Father     Breast Cancer Neg Hx          Physical Examination:  /88   Pulse 69   Ht 5' 5\" (1.651 m)   Wt 202 lb (91.6 kg)   SpO2 97%   BMI 33.61 kg/m²   Physical Exam  Vitals reviewed. Constitutional:       General: She is not in acute distress. Appearance: Normal appearance. She is not ill-appearing, toxic-appearing or diaphoretic. HENT:      Head: Normocephalic and atraumatic. Eyes:      General: No scleral icterus. Right eye: No discharge. Left eye: No discharge. Conjunctiva/sclera: Conjunctivae normal.   Neck:      Vascular: No carotid bruit. Cardiovascular:      Rate and Rhythm: Normal rate and regular rhythm. No extrasystoles are present. Chest Wall: PMI is not displaced. No thrill. Heart sounds: S1 normal and S2 normal. No murmur heard. No friction rub. No gallop. Pulmonary:      Effort: Pulmonary effort is normal. No tachypnea or respiratory distress. Breath sounds: Normal breath sounds. No stridor. No wheezing, rhonchi or rales. Chest:      Chest wall: No tenderness. Abdominal:      General: Bowel sounds are normal. There is no distension. Palpations: Abdomen is soft. There is no mass. Tenderness: There is no abdominal tenderness. There is no guarding. Musculoskeletal:         General: No swelling. Cervical back: Normal range of motion and neck supple. No rigidity. Right lower leg: No edema. Left lower leg: No edema. Skin:     General: Skin is warm and dry. Coloration: Skin is not jaundiced. Findings: No erythema or rash. Neurological:      Mental Status: She is alert and oriented to person, place, and time. Mental status is at baseline. Motor: Weakness present.       Gait: Gait abnormal.   Psychiatric:         Mood and Affect: Mood normal. Behavior: Behavior normal.         Thought Content: Thought content normal.           Labs:   CBC: No results found for: CBC   BMP: No results found for: BMP    BNP: No results found for: BNPINT   PT/INR:   Protime   Date Value Ref Range Status   06/03/2021 12.8 12.0 - 14.6 sec Final     INR   Date Value Ref Range Status   06/03/2021 0.97 0.88 - 1.18 Final     Comment:     INR  < or = 1.3  Normal  INR = 2.0 - 3.0  Therapeutic  INR = 2.5 - 3.5  Therapeutic for patients with mechanical  prosthetic heart valve & MI prophylaxis  INR  > or = 3.5  Abnormal/Elevated  INR  > or = 5.0  Critical (requires immediate physician  notification)         APTT:    aPTT   Date Value Ref Range Status   06/03/2021 30.3 26.0 - 36.2 sec Final      CARDIAC ENZYMES:   Troponin   Date Value Ref Range Status   06/03/2021 <0.01 0.00 - 0.03 ng/mL Final     Comment:     <0.030 ng/ml       No measurable cardiac damage. 0.030-0.099 ng/ml  Values of troponin in this range suggest  possible myocardial damage. Repeat assay  4 to 6 hours after the current specimen.    >= 0.100 ng/ml     Indicative of myocardial damage. Recommend continued monitoring of  patient status and cardiac markers. LIPID PANEL: No results found for: LIPIDPAN  LIVER PROFILE:   AST   Date Value Ref Range Status   01/05/2022 24 5 - 32 U/L Final     ALT   Date Value Ref Range Status   01/05/2022 23 5 - 33 U/L Final     Albumin   Date Value Ref Range Status   01/05/2022 4.2 3.5 - 5.2 g/dL Final          EKG: Normal sinus rhythm. Normal QTc interval.    Nuclear medicine stress test 2014: Grossly negative with no evidence for ischemia or injury. 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 Improved left-sided weakness, persistent left hand numbness and tingling.     Remote history of atrial fibrillation status post DCCV (9/2014). No documented evidence for recurrent atrial fibrillation on telemetry monitoring while hospitalized, ZIO monitoring as outpatient and serial EKGs. Presumably right middle cerebral artery embolism may have resulted from occult paroxysmal atrial fibrillation. Given high risk for recurrent thromboembolism, I would agree with continuation of anticoagulation therapy. Note is made of punctate microhemorrhage associated with CVA on remote MRI 6/3/21. She has continued to improve clinically and has been followed closely by neurology. Significantly also patient with 2 episodes of falls which were nontraumatic. She has since been using a cane without any further recurrences. It may be reasonable to consider referral for implantable left atrial appendage occluder. Patient would like to defer at this time. Continue with current treatment plan. Electronically signed by Wander Gilbert MD on 3/22/2022 at 1:35 PM    Wander Gilbert MD, LIVIA, [facility]  Noninvasive Cardiology Consultant    This dictation was generated by voice recognition computer software. Although all attempts are made to edit the dictation for accuracy, there may be errors in the transcription that are not intended.

## 2022-03-23 RX ORDER — ROSUVASTATIN CALCIUM 10 MG/1
TABLET, COATED ORAL
Qty: 90 TABLET | Refills: 1 | Status: SHIPPED | OUTPATIENT
Start: 2022-03-23

## 2022-03-23 NOTE — TELEPHONE ENCOUNTER
Faviola Ley called requesting a refill of the below medication which has been pended for you:     Requested Prescriptions     Pending Prescriptions Disp Refills    rosuvastatin (CRESTOR) 10 MG tablet [Pharmacy Med Name: Rosuvastatin Calcium 10 MG Oral Tablet] 90 tablet 1     Sig: Take 1 tablet by mouth once daily       Last Appointment Date: 1/5/2022  Next Appointment Date: 5/5/2022    No Known Allergies

## 2022-04-01 RX ORDER — METOPROLOL SUCCINATE 25 MG/1
12.5 TABLET, EXTENDED RELEASE ORAL DAILY
Qty: 45 TABLET | Refills: 1 | Status: SHIPPED | OUTPATIENT
Start: 2022-04-01

## 2022-05-03 ENCOUNTER — HOSPITAL ENCOUNTER (OUTPATIENT)
Dept: SLEEP CENTER | Age: 77
Discharge: HOME | End: 2022-05-05
Payer: MEDICARE

## 2022-05-03 PROCEDURE — 95810 POLYSOM 6/> YRS 4/> PARAM: CPT

## 2022-05-04 NOTE — PROGRESS NOTES
[place] [address]  [place], [address]  Phone [phone] Fax [phone]     Sleep Study Technician Review    Patient Name:  Nira Seip  :   1945  Referring Provider: Dennie Silvan, MD    Brief History:  Nira Seip is a 64 y.o. female with a history of Hypertension, GERDS, Afib, Stroke, CVA who has been referred for a sleep study. She relates that she is not sleeping well. She snores. She wakens frequently. She does not rest well and is drowsy during the daytime. She often does nap. Height:  5'5\"  Weight: 195 lbs  BMI: 32.45  Neck Circ:  15.5\"  MALLAMPATI: Type 4  ESS: 10/24     Type of Study:PSG  Time Stage Position Snore Hypopnea Obs Apnea Farzaneh Apnea PAP O2   2200 Awake Supine No No No No  RA   2300 2 Right No No No No  RA   2400 Awake Right No No No No  RA   0100 Awake Left No No No No  RA   0200 2 Left No No No No  RA   0300 2 Right No No No No  RA   0400 REM Right No No No No  RA   0450 Awake Right No No No No  RA                Summary: Pt stated that she has daytime fatigue and she naps daily. Pt had a few events in REM. DME: Mariya Infante     The study was reviewed briefly with Nira Seip. She will be notified of the formal results and recommendations after the study is scored and interpreted. The report will be sent to his referring provider.     Technician: MARIBEL Michaud

## 2022-05-19 ENCOUNTER — OFFICE VISIT (OUTPATIENT)
Dept: INTERNAL MEDICINE | Age: 77
End: 2022-05-19
Payer: MEDICARE

## 2022-05-19 VITALS
HEIGHT: 65 IN | RESPIRATION RATE: 18 BRPM | BODY MASS INDEX: 32.65 KG/M2 | WEIGHT: 196 LBS | SYSTOLIC BLOOD PRESSURE: 118 MMHG | HEART RATE: 67 BPM | DIASTOLIC BLOOD PRESSURE: 70 MMHG | OXYGEN SATURATION: 97 %

## 2022-05-19 DIAGNOSIS — E03.9 ACQUIRED HYPOTHYROIDISM: ICD-10-CM

## 2022-05-19 DIAGNOSIS — R73.01 IFG (IMPAIRED FASTING GLUCOSE): ICD-10-CM

## 2022-05-19 DIAGNOSIS — E53.8 B12 DEFICIENCY: Primary | ICD-10-CM

## 2022-05-19 DIAGNOSIS — Z86.73 STATUS POST CVA: ICD-10-CM

## 2022-05-19 DIAGNOSIS — E55.9 VITAMIN D DEFICIENCY: ICD-10-CM

## 2022-05-19 DIAGNOSIS — E66.09 EXOGENOUS OBESITY: ICD-10-CM

## 2022-05-19 DIAGNOSIS — I48.0 PAF (PAROXYSMAL ATRIAL FIBRILLATION) (HCC): ICD-10-CM

## 2022-05-19 DIAGNOSIS — I67.9 CEREBROVASCULAR DISEASE: ICD-10-CM

## 2022-05-19 DIAGNOSIS — E53.8 B12 DEFICIENCY: ICD-10-CM

## 2022-05-19 DIAGNOSIS — Z00.00 MEDICARE ANNUAL WELLNESS VISIT, SUBSEQUENT: ICD-10-CM

## 2022-05-19 DIAGNOSIS — E78.00 PURE HYPERCHOLESTEROLEMIA: ICD-10-CM

## 2022-05-19 LAB
ALBUMIN SERPL-MCNC: 4.3 G/DL (ref 3.5–5.2)
ALP BLD-CCNC: 76 U/L (ref 35–104)
ALT SERPL-CCNC: 19 U/L (ref 5–33)
ANION GAP SERPL CALCULATED.3IONS-SCNC: 9 MMOL/L (ref 7–19)
AST SERPL-CCNC: 26 U/L (ref 5–32)
BILIRUB SERPL-MCNC: 0.8 MG/DL (ref 0.2–1.2)
BUN BLDV-MCNC: 12 MG/DL (ref 8–23)
CALCIUM SERPL-MCNC: 9.4 MG/DL (ref 8.8–10.2)
CHLORIDE BLD-SCNC: 107 MMOL/L (ref 98–111)
CHOLESTEROL, TOTAL: 179 MG/DL (ref 160–199)
CO2: 26 MMOL/L (ref 22–29)
CREAT SERPL-MCNC: 0.6 MG/DL (ref 0.5–0.9)
GFR AFRICAN AMERICAN: >59
GFR NON-AFRICAN AMERICAN: >60
GLUCOSE BLD-MCNC: 96 MG/DL (ref 74–109)
HBA1C MFR BLD: 5.4 % (ref 4–6)
HDLC SERPL-MCNC: 73 MG/DL (ref 65–121)
LDL CHOLESTEROL CALCULATED: 79 MG/DL
POTASSIUM SERPL-SCNC: 4.6 MMOL/L (ref 3.5–5)
SODIUM BLD-SCNC: 142 MMOL/L (ref 136–145)
TOTAL PROTEIN: 6.6 G/DL (ref 6.6–8.7)
TRIGL SERPL-MCNC: 136 MG/DL (ref 0–149)
TSH SERPL DL<=0.05 MIU/L-ACNC: 3.59 UIU/ML (ref 0.27–4.2)
VITAMIN B-12: 1354 PG/ML (ref 211–946)
VITAMIN D 25-HYDROXY: 38.8 NG/ML

## 2022-05-19 PROCEDURE — G8399 PT W/DXA RESULTS DOCUMENT: HCPCS | Performed by: INTERNAL MEDICINE

## 2022-05-19 PROCEDURE — 1036F TOBACCO NON-USER: CPT | Performed by: INTERNAL MEDICINE

## 2022-05-19 PROCEDURE — 1090F PRES/ABSN URINE INCON ASSESS: CPT | Performed by: INTERNAL MEDICINE

## 2022-05-19 PROCEDURE — G8417 CALC BMI ABV UP PARAM F/U: HCPCS | Performed by: INTERNAL MEDICINE

## 2022-05-19 PROCEDURE — 1123F ACP DISCUSS/DSCN MKR DOCD: CPT | Performed by: INTERNAL MEDICINE

## 2022-05-19 PROCEDURE — 4040F PNEUMOC VAC/ADMIN/RCVD: CPT | Performed by: INTERNAL MEDICINE

## 2022-05-19 PROCEDURE — G8427 DOCREV CUR MEDS BY ELIG CLIN: HCPCS | Performed by: INTERNAL MEDICINE

## 2022-05-19 PROCEDURE — 99214 OFFICE O/P EST MOD 30 MIN: CPT | Performed by: INTERNAL MEDICINE

## 2022-05-19 ASSESSMENT — ENCOUNTER SYMPTOMS
WHEEZING: 0
COUGH: 0
CHEST TIGHTNESS: 0
ABDOMINAL PAIN: 0
CONSTIPATION: 0
SORE THROAT: 0

## 2022-05-19 NOTE — PROGRESS NOTES
Chief Complaint   Patient presents with    Follow-up     4 month    Fatigue     History of presenting illness:  Axel Fung is a64 y.o. female who presents today for follow up on her chronic medical conditions as noted below.       Patient Active Problem List    Diagnosis Date Noted    Acute CVA (cerebrovascular accident) ([facility] [address] 06/03/2021    GERD (gastroesophageal reflux disease)     Non-rheumatic mitral regurgitation 11/16/2017     Overview Note:     2014 mil/ mod      Pure hypercholesterolemia 11/16/2017    IFG (impaired fasting glucose) 11/16/2017    Acquired hypothyroidism 11/16/2017    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    B12 deficiency 11/16/2017    Vitamin D deficiency 11/16/2017    [facility]oxysmal [facility] 09/07/2014     Overview Note:     9/6/2014  DCCV in ED  09/07/2014  Echo  Normal LVFX  09/07/2014  lexiscan negative for myocardial ischemia, EF 62 %            Personal history of breast cancer 08/20/2014    S/P lumpectomy, right breast 1/7/14 02/12/2014    Breast [facility] ([facility] 01/2014     Overview Note:     right      Malignant neoplasm of other specified sites of female breast 12/09/2013     Past Medical History:   Diagnosis Date    [facility] 1/2014    right    GERD (gastroesophageal reflux disease)     History of therapeutic radiation     Hyperlipidemia     Thyroid disease     Wears glasses       Past Surgical History:   Procedure Laterality Date    BREAST SURGERY Bilateral 1985    FIBROIDS REMOVED FROM EACH BREAST    BREAST SURGERY Right 1/7/14    R partial mastectomy    BREAST SURGERY Right 1/22/14    Rt JUAN R cath insertion    EYE SURGERY      cataracts    HYSTERECTOMY      MELVINA AND BSO       Current Outpatient Medications   Medication Sig Dispense Refill    metoprolol succinate (TOPROL XL) 25 MG extended release tablet Take 0.5 tablets by mouth daily 45 tablet 1    rosuvastatin (CRESTOR) 10 MG tablet Take 1 tablet by mouth once daily 90 tablet 1    ELIQUIS 5 MG TABS tablet Take 1 tablet by mouth 2 times daily 180 tablet 2    flecainide (TAMBOCOR) 150 MG tablet Take 1 tablet by mouth in the morning and at bedtime 180 tablet 2    levothyroxine (EUTHYROX) 75 MCG tablet Take 1 tablet by mouth once daily 90 tablet 1    Coenzyme Q10 (COQ10) 100 MG CAPS Take by mouth daily      vitamin B-12 (CYANOCOBALAMIN) 100 MCG tablet Take 50 mcg by mouth daily      latanoprost (XALATAN) 0.005 % ophthalmic solution Place 1 drop into both eyes nightly       Vitamin D (CHOLECALCIFEROL) 1000 UNITS CAPS capsule Take 2,000 Units by mouth daily       aspirin 81 MG tablet Take 81 mg by mouth daily. No current facility-administered medications for this visit. No Known Allergies  Social History     Tobacco Use    Smoking status: Never Smoker    Smokeless tobacco: Never Used   Substance Use Topics    Alcohol use: No      Family History   Problem Relation Age of Onset    Cancer Mother         throat    Cancer Maternal Grandmother         ovarian    Ovarian Cancer Maternal Grandmother     Parkinsonism Father     Breast Cancer Neg Hx        Review of Systems   Constitutional: Positive for fatigue. Negative for chills and fever. HENT: Negative for congestion, ear pain, nosebleeds, postnasal drip and sore throat. Respiratory: Negative for cough, chest tightness and wheezing. Cardiovascular: Negative for chest pain, palpitations and leg swelling. Gastrointestinal: Negative for abdominal pain and constipation. Genitourinary: Negative for dysuria and urgency. Musculoskeletal: Positive for gait problem. Negative for arthralgias. Skin: Negative for rash. Neurological: Negative for dizziness and headaches. Psychiatric/Behavioral: Negative.       Vitals:    05/19/22 1055   BP: 118/70   Site: Left Upper Arm   Position: Sitting   Cuff Size: Large Adult   Pulse: 67   Resp: 18   SpO2: 97%   Weight: 196 lb (88.9 kg)   Height: 5' 5\" (1.651 m)     Body mass index is 32.62 kg/m². Physical Exam  Constitutional:       Appearance: She is well-developed. HENT:      Right Ear: External ear normal.      Left Ear: External ear normal.      Mouth/Throat:      Pharynx: No oropharyngeal exudate. Eyes:      Conjunctiva/sclera: Conjunctivae normal.      Pupils: Pupils are equal, round, and reactive to light. Neck:      Thyroid: No thyromegaly. Vascular: No JVD. Cardiovascular:      Rate and Rhythm: Normal rate. Heart sounds: Normal heart sounds. No murmur heard. Pulmonary:      Effort: No respiratory distress. Breath sounds: Normal breath sounds. No wheezing or rales. Chest:      Chest wall: No tenderness. Abdominal:      General: Bowel sounds are normal.      Palpations: Abdomen is soft. Musculoskeletal:      Cervical back: Neck supple. Lymphadenopathy:      Cervical: No cervical adenopathy. Skin:     Findings: No rash.    Neurological:      Gait: Gait abnormal.      Comments: Balance issue post CVA         Lab Review   Orders Only on 05/19/2022   Component Date Value    Vitamin B-12 05/19/2022 1354*    Vit D, 25-Hydroxy 05/19/2022 38.8     Sodium 05/19/2022 142     Potassium 05/19/2022 4.6     Chloride 05/19/2022 107     CO2 05/19/2022 26     Anion Gap 05/19/2022 9     Glucose 05/19/2022 96     BUN 05/19/2022 12     CREATININE 05/19/2022 0.6     GFR Non- 05/19/2022 >60     GFR  05/19/2022 >59     Calcium 05/19/2022 9.4     Total Protein 05/19/2022 6.6     Albumin 05/19/2022 4.3     Total Bilirubin 05/19/2022 0.8     Alkaline Phosphatase 05/19/2022 76     ALT 05/19/2022 19     AST 05/19/2022 26     Hemoglobin A1C 05/19/2022 5.4     Cholesterol, Total 05/19/2022 179     Triglycerides 05/19/2022 136     HDL 05/19/2022 73     LDL Calculated 05/19/2022 79            ASSESSMENT/PLAN:    Severe daytime sleepiness  Had sleep study 5/3/22      Pure hypercholesterolemia-   I have personally reviewed and interpreted these lab results and thoroughly discussed with patient     LDL is 79 in 5/2022  Was off statin since 2019 but restarted late summer 2021  Rx Crestor 10 mg p.o. daily  Healthy, mostly fiber rich nonstarchy plant-based diet recommended  Recommend to decrease intake of processed foods, simple carbohydrates and animal-based products that high in saturated fats     Cerebrovascular disease  Post CVA 2021  No agreeable to statin as above  Rx Crestor 10 mg p.o. daily  Rx aspirin 81 mg      PAF  Remains on Eliquis 5 mg and testerone and follows with cardiology     Acquired hypothyroidism-her thyroid levels =  Results reviewed + dw pt  RX Synthroid 75 µg daily-      B12 deficiency   B12 level currently is 1354  Lab discussed with patient  RX 1 MG B12 DAILY PO     IFG (impaired fasting glucose)  OBESITY  = Hemoglobin A1c is improved at 5.4 in 5/2022  Previously 5.7 January 2022  strict lower carbohydrate diet and weight loss is recommended  Pt was given approximately 5 minutes of counseling about diet and exercise including education on what calories are, where calories come from, the need for portion control,following lower carbohydrate dietary regimen and healthy snacks along side an active lifestyle with supplementary exercise of approx 30 minutes a week, 5 days a week of exercise for weight loss.  The patient voiced increased understanding of the topics discussed.       PAF (paroxysmal atrial fibrillation) ([facility] [address] [facility]- no further episdoes  Monitor closely      Vitamin D deficiency-   level is improved 38 in 5/2022  Previously low at 31 in January 2022  RX vit D increase to  4000 iu daily  Lab reviewed with pt     Exogenous obesity  Pt was given approximately 5 minutes of counseling about diet and exercise including education on what calories are, where calories come from, the need for portion control,following lower carbohydrate dietary regimen and healthy snacks along side an active lifestyle with supplementary exercise of approx 30 minutes a week, 5 days a week of exercise for weight loss.  The patient voiced increased understanding of the topics discussed.         Orders Placed This Encounter   Procedures    Hemoglobin A1C    Comprehensive Metabolic Panel    Lipid Panel    Vitamin D 25 Hydroxy    Vitamin B12    TSH    T4, Free     New Prescriptions    No medications on file         No follow-ups on file. There are no Patient Instructions on file for this visit. EMR Dragon/transcription disclaimer:Significant part of this  encounter note is electronic transcription/translationof spoken language to printed text. The electronic translation of spoken language may be erroneous, or at times, nonsensical words or phrases may be inadvertently transcribed.  Although I have reviewed the note for sucherrors, some may still exist.

## 2022-05-22 DIAGNOSIS — G47.33 SLEEP APNEA, OBSTRUCTIVE: Primary | ICD-10-CM

## 2022-05-22 PROCEDURE — 95811 POLYSOM 6/>YRS CPAP 4/> PARM: CPT | Performed by: PSYCHIATRY & NEUROLOGY

## 2022-06-08 ENCOUNTER — HOSPITAL ENCOUNTER (OUTPATIENT)
Dept: SLEEP CENTER | Age: 77
Discharge: HOME | End: 2022-06-10
Payer: MEDICARE

## 2022-06-08 DIAGNOSIS — G47.33 SLEEP APNEA, OBSTRUCTIVE: ICD-10-CM

## 2022-06-08 PROCEDURE — 95811 POLYSOM 6/>YRS CPAP 4/> PARM: CPT

## 2022-06-09 NOTE — PROGRESS NOTES
[place] [address]  [place], [address]  Phone [phone] Fax [phone]     Sleep Study Technician Review    Patient Name:  Kelly Sousa  :   1945  Referring Provider: Elizabeth Cooper MD    Brief History:  Kelly Sousa is a 64 y.o. female with a history of Hypertension, GERDS, Afib, Stroke, CVA who has been referred for a sleep study. She relates that she is not sleeping well. She snores. She wakens frequently. She does not rest well and is drowsy during the daytime. She often does nap. Height:  5'5\"  Weight: 195 lbs  BMI:     32.45  Neck Circ:  15.5\"  MALLAMPATI: Type 4  ESS:    10/24        Type of Study: Titration  Time Stage Position Snore Hypopnea Obs Apnea Farzaneh Apnea PAP O2   2200 Awake Supine No No No No Cpap 4 RA   2300 2 Right No No No No Cpap 4 RA   2400 2 Right No No No No Cpap 6 RA   0100 REM Right No No No No Cpap 8 RA   0200 2 Left No No No No Cpap 8 RA   0300 2 Left No No No No Cpap 8 RA   0400 2 Left No No No No Cpap 9 RA   0425 Awake Supine No No No No Cpap 9 RA                Summary: Pt tolerated mask well. DME: Sarah Irizarry     Final PAP settings: Cpap 9  Mask Type: Full Face  Mask: Vitera   Mask Size:  Small    The study was reviewed briefly with Kelly Sousa. She will be notified of the formal results and recommendations after the study is scored and interpreted. The report will be sent to his referring provider.     Technician: MARIBEL Weiner

## 2022-07-10 DIAGNOSIS — G47.33 SLEEP APNEA, OBSTRUCTIVE: Primary | ICD-10-CM

## 2022-07-10 PROCEDURE — 95811 POLYSOM 6/>YRS CPAP 4/> PARM: CPT | Performed by: PSYCHIATRY & NEUROLOGY

## 2022-07-15 RX ORDER — LEVOTHYROXINE SODIUM 0.07 MG/1
TABLET ORAL
Qty: 90 TABLET | Refills: 0 | Status: SHIPPED | OUTPATIENT
Start: 2022-07-15 | End: 2022-10-14

## 2022-07-15 NOTE — TELEPHONE ENCOUNTER
Chiara Juarez called to request a refill on her medication.       Last office visit : 5/19/2022   Next office visit : 9/20/2022     Requested Prescriptions     Pending Prescriptions Disp Refills    levothyroxine (EUTHYROX) 75 MCG tablet [Pharmacy Med Name: Euthyrox 75 MCG Oral Tablet] 90 tablet 0     Sig: Take 1 tablet by mouth once daily            Srinivasa Mcclain

## 2022-08-15 ENCOUNTER — OFFICE VISIT (OUTPATIENT)
Dept: NEUROLOGY | Age: 77
End: 2022-08-15
Payer: MEDICARE

## 2022-08-15 VITALS
SYSTOLIC BLOOD PRESSURE: 138 MMHG | WEIGHT: 194 LBS | DIASTOLIC BLOOD PRESSURE: 85 MMHG | HEIGHT: 65 IN | RESPIRATION RATE: 18 BRPM | HEART RATE: 62 BPM | BODY MASS INDEX: 32.32 KG/M2

## 2022-08-15 DIAGNOSIS — G47.33 SLEEP APNEA, OBSTRUCTIVE: ICD-10-CM

## 2022-08-15 DIAGNOSIS — I69.319 COGNITIVE DEFICIT, POST-STROKE: Primary | ICD-10-CM

## 2022-08-15 DIAGNOSIS — R20.9 ALTERATION OF SENSATION AS LATE EFFECT OF STROKE: ICD-10-CM

## 2022-08-15 DIAGNOSIS — R53.83 OTHER FATIGUE: ICD-10-CM

## 2022-08-15 DIAGNOSIS — I69.398 ALTERATION OF SENSATION AS LATE EFFECT OF STROKE: ICD-10-CM

## 2022-08-15 PROCEDURE — 1123F ACP DISCUSS/DSCN MKR DOCD: CPT | Performed by: PSYCHIATRY & NEUROLOGY

## 2022-08-15 PROCEDURE — 1036F TOBACCO NON-USER: CPT | Performed by: PSYCHIATRY & NEUROLOGY

## 2022-08-15 PROCEDURE — 99214 OFFICE O/P EST MOD 30 MIN: CPT | Performed by: PSYCHIATRY & NEUROLOGY

## 2022-08-15 PROCEDURE — G8399 PT W/DXA RESULTS DOCUMENT: HCPCS | Performed by: PSYCHIATRY & NEUROLOGY

## 2022-08-15 PROCEDURE — 1090F PRES/ABSN URINE INCON ASSESS: CPT | Performed by: PSYCHIATRY & NEUROLOGY

## 2022-08-15 PROCEDURE — G8427 DOCREV CUR MEDS BY ELIG CLIN: HCPCS | Performed by: PSYCHIATRY & NEUROLOGY

## 2022-08-15 PROCEDURE — G8417 CALC BMI ABV UP PARAM F/U: HCPCS | Performed by: PSYCHIATRY & NEUROLOGY

## 2022-08-15 RX ORDER — DULOXETIN HYDROCHLORIDE 30 MG/1
30 CAPSULE, DELAYED RELEASE ORAL DAILY
Qty: 30 CAPSULE | Refills: 5 | Status: SHIPPED | OUTPATIENT
Start: 2022-08-15

## 2022-08-15 NOTE — PROGRESS NOTES
[facility]  [address] [place] [address] Drive, [address]  [place], [place]  Phone [phone]  Fax [phone]     [facility] Follow Up Encounter  8/15/22 11:50 AM CDT    Information:   Patient Name: Navi Navarro  :   1945  Age:   64 y.o. MRN:   [id number]  Account #:  [de-identified]  Today:  8/15/22    Provider: Johanna Cifuentes M.D. Chief Complaint:   Chief Complaint   Patient presents with    Follow-up    Cerebrovascular Accident       Subjective:   Navi Navarro is a 64 y.o. woman with a history of stroke and residual let sided numbness who is following up. She relates that she had some numbness and tingling in her hands bilaterally prior to the stroke. She is an artist and uses colored pencils. She has persistent numbness and tingling in the left hand. That has impaired her ability to draw. She is ambulating without a cane or walker. She remains on Eliquis. She has had no palpitations. She has had some memory impairment since her stroke. Last visit, she was complaining of poor sleep and daytime sleepiness. She was referred for a polysomnogram that showed MARIELLA with an AHI of 16 and low sat of 75%. She was referred for CPAP set up at 9cm. She says she has had problems finding a DME provider that has CPAPs in stock or will order one for her. She resides in [place].         Objective:     Past Medical History:  Past Medical History:   Diagnosis Date    Breast cancer ([address] 2014    right    GERD (gastroesophageal reflux disease)     History of therapeutic radiation     Hyperlipidemia     Thyroid disease     Wears glasses        Past Surgical History:   Procedure Laterality Date    BREAST SURGERY Bilateral     FIBROIDS REMOVED FROM [facility] BREAST    BREAST SURGERY Right 2014    R partial mastectomy    BREAST SURGERY Right 2014    Rt JUAN R cath insertion    CARPAL TUNNEL RELEASE Left     EYE SURGERY      cataracts    HYSTERECTOMY (CERVIX STATUS UNKNOWN)      MELVINA AND BSO (CERVIX REMOVED)         Recent Hospitalizations  None    Significant Injuries  None    Habits  Chiara Juarez reports that she has never smoked. She has never used smokeless tobacco. She reports that she does not drink alcohol and does not use drugs. Family History   Problem Relation Age of Onset    Cancer Mother         throat    Cancer Maternal Grandmother         ovarian    Ovarian Cancer Maternal Grandmother     Parkinsonism Father     Breast Cancer Neg Hx        Social History  Chiara Juarez is , lives in [place], [place], and is retired. Medications:  Current Outpatient Medications   Medication Sig Dispense Refill    levothyroxine (EUTHYROX) 75 MCG tablet Take 1 tablet by mouth once daily 90 tablet 0    metoprolol succinate (TOPROL XL) 25 MG extended release tablet Take 0.5 tablets by mouth daily 45 tablet 1    rosuvastatin (CRESTOR) 10 MG tablet Take 1 tablet by mouth once daily 90 tablet 1    ELIQUIS 5 MG TABS tablet Take 1 tablet by mouth 2 times daily 180 tablet 2    flecainide (TAMBOCOR) 150 MG tablet Take 1 tablet by mouth in the morning and at bedtime 180 tablet 2    Coenzyme Q10 (COQ10) 100 MG CAPS Take by mouth daily      vitamin B-12 (CYANOCOBALAMIN) 100 MCG tablet Take 50 mcg by mouth daily      latanoprost (XALATAN) 0.005 % ophthalmic solution Place 1 drop into both eyes nightly       Vitamin D (CHOLECALCIFEROL) 1000 UNITS CAPS capsule Take 2,000 Units by mouth daily       aspirin 81 MG tablet Take 81 mg by mouth daily. No current facility-administered medications for this visit. Allergies:   Allergies as of 08/15/2022    (No Known Allergies)       Review of Systems:  Constitutional: negative for - chills and fever  Eyes:  negative for - visual disturbance and photophobia  HENMT: negative for - headaches and sinus pain  Respiratory: negative for - cough, hemoptysis, and shortness of breath  Cardiovascular: negative for - chest pain and palpitations  Gastrointestinal: negative for - blood in stools, constipation, diarrhea, nausea, and vomiting  Genito-Urinary: negative for - hematuria, urinary frequency, urinary urgency, and urinary retention  Musculoskeletal: negative for - joint pain, joint stiffness, and joint swelling  Hematological and Lymphatic: negative for - bleeding problems, abnormal bruising, and swollen lymph nodes  Endocrine:  negative for - polydipsia and polyphagia  Allergy/Immunology:  negative for - rhinorrhea, sinus congestion, hives  Integument:  negative for - negative for - rash, change in moles, new or changing lesions  Psychological: negative for - anxiety and depression  Neurological: positive for - memory loss, numbness/tingling  negative for - weakness     PHYSICAL EXAMINATION:  Vitals:  /85   Pulse 62   Resp 18   Ht 5' 5\" (1.651 m)   Wt 194 lb (88 kg)   BMI 32.28 kg/m²   General appearance:  Alert, well developed, well nourished, in no distress  HEENT:  normocephalic, atraumatic, sclera appear normal, no nasal abnormalities, no rhinorrhea, Ears appear normal, oral mucous membranes are moist without erythema, trachea midline, thyroid is normal, no lymphadenopathy or neck mass. Cardiovascular:  Regular rate and rhythm without murmer. No peripheral edema, No cyanosis or clubbing. No carotid bruits. Pulmonary:  Lungs are clear to auscultation. Breathing appears normal, good expansion, normal effort without use of accessory muscles  Musculoskeletal:  Joints are arthritic  Integument:  No rash, erythema, or pallor  Psychiatric:  Mood, affect, and behavior appear normal      NEUROLOGIC EXAMINATION:  Mental Status:  alert, oriented to person, place, and time.   Speech:  Clear without dysarthria or dysphonia  Language:  Fluent without aphasia  Cranial Nerves:   II Visual fields are full to confrontation   III,IV, VI Extraocular movements are full   VII Facial movements are symmetrical without weakness   VIII Hearing is intact   IX,X Shoulder shrug and head

## 2022-09-20 ENCOUNTER — OFFICE VISIT (OUTPATIENT)
Dept: INTERNAL MEDICINE | Age: 77
End: 2022-09-20
Payer: MEDICARE

## 2022-09-20 VITALS
DIASTOLIC BLOOD PRESSURE: 78 MMHG | OXYGEN SATURATION: 97 % | WEIGHT: 204 LBS | HEART RATE: 71 BPM | BODY MASS INDEX: 40.05 KG/M2 | HEIGHT: 60 IN | SYSTOLIC BLOOD PRESSURE: 128 MMHG | RESPIRATION RATE: 18 BRPM

## 2022-09-20 DIAGNOSIS — E55.9 VITAMIN D DEFICIENCY: ICD-10-CM

## 2022-09-20 DIAGNOSIS — R73.01 IFG (IMPAIRED FASTING GLUCOSE): ICD-10-CM

## 2022-09-20 DIAGNOSIS — E78.00 PURE HYPERCHOLESTEROLEMIA: ICD-10-CM

## 2022-09-20 DIAGNOSIS — E53.8 B12 DEFICIENCY: ICD-10-CM

## 2022-09-20 DIAGNOSIS — I67.9 CEREBROVASCULAR DISEASE: ICD-10-CM

## 2022-09-20 DIAGNOSIS — I48.0 PAF (PAROXYSMAL ATRIAL FIBRILLATION) (HCC): Primary | ICD-10-CM

## 2022-09-20 DIAGNOSIS — E03.9 ACQUIRED HYPOTHYROIDISM: ICD-10-CM

## 2022-09-20 DIAGNOSIS — K52.9 CHRONIC DIARRHEA: ICD-10-CM

## 2022-09-20 DIAGNOSIS — I48.0 PAF (PAROXYSMAL ATRIAL FIBRILLATION) (HCC): ICD-10-CM

## 2022-09-20 LAB
ALBUMIN SERPL-MCNC: 4.1 G/DL (ref 3.5–5.2)
ALP BLD-CCNC: 71 U/L (ref 35–104)
ALT SERPL-CCNC: 18 U/L (ref 5–33)
ANION GAP SERPL CALCULATED.3IONS-SCNC: 10 MMOL/L (ref 7–19)
AST SERPL-CCNC: 22 U/L (ref 5–32)
BILIRUB SERPL-MCNC: 0.7 MG/DL (ref 0.2–1.2)
BUN BLDV-MCNC: 10 MG/DL (ref 8–23)
CALCIUM SERPL-MCNC: 9.3 MG/DL (ref 8.8–10.2)
CHLORIDE BLD-SCNC: 106 MMOL/L (ref 98–111)
CHOLESTEROL, TOTAL: 169 MG/DL (ref 160–199)
CO2: 25 MMOL/L (ref 22–29)
CREAT SERPL-MCNC: 0.6 MG/DL (ref 0.5–0.9)
GFR AFRICAN AMERICAN: >59
GFR NON-AFRICAN AMERICAN: >60
GLUCOSE BLD-MCNC: 97 MG/DL (ref 74–109)
HBA1C MFR BLD: 5.5 % (ref 4–6)
HDLC SERPL-MCNC: 71 MG/DL (ref 65–121)
LDL CHOLESTEROL CALCULATED: 74 MG/DL
POTASSIUM SERPL-SCNC: 3.9 MMOL/L (ref 3.5–5)
SODIUM BLD-SCNC: 141 MMOL/L (ref 136–145)
T4 FREE: 1.54 NG/DL (ref 0.93–1.7)
TOTAL PROTEIN: 6.7 G/DL (ref 6.6–8.7)
TRIGL SERPL-MCNC: 121 MG/DL (ref 0–149)
TSH SERPL DL<=0.05 MIU/L-ACNC: 5.09 UIU/ML (ref 0.27–4.2)
VITAMIN B-12: 1569 PG/ML (ref 211–946)
VITAMIN D 25-HYDROXY: 31.5 NG/ML

## 2022-09-20 PROCEDURE — G8417 CALC BMI ABV UP PARAM F/U: HCPCS | Performed by: INTERNAL MEDICINE

## 2022-09-20 PROCEDURE — 1123F ACP DISCUSS/DSCN MKR DOCD: CPT | Performed by: INTERNAL MEDICINE

## 2022-09-20 PROCEDURE — 99214 OFFICE O/P EST MOD 30 MIN: CPT | Performed by: INTERNAL MEDICINE

## 2022-09-20 PROCEDURE — G8399 PT W/DXA RESULTS DOCUMENT: HCPCS | Performed by: INTERNAL MEDICINE

## 2022-09-20 PROCEDURE — 1090F PRES/ABSN URINE INCON ASSESS: CPT | Performed by: INTERNAL MEDICINE

## 2022-09-20 PROCEDURE — 1036F TOBACCO NON-USER: CPT | Performed by: INTERNAL MEDICINE

## 2022-09-20 PROCEDURE — G8428 CUR MEDS NOT DOCUMENT: HCPCS | Performed by: INTERNAL MEDICINE

## 2022-09-20 SDOH — ECONOMIC STABILITY: FOOD INSECURITY: WITHIN THE PAST 12 MONTHS, THE FOOD YOU BOUGHT JUST DIDN'T LAST AND YOU DIDN'T HAVE MONEY TO GET MORE.: NEVER TRUE

## 2022-09-20 SDOH — ECONOMIC STABILITY: FOOD INSECURITY: WITHIN THE PAST 12 MONTHS, YOU WORRIED THAT YOUR FOOD WOULD RUN OUT BEFORE YOU GOT MONEY TO BUY MORE.: NEVER TRUE

## 2022-09-20 ASSESSMENT — ENCOUNTER SYMPTOMS
SORE THROAT: 0
COUGH: 0
CHEST TIGHTNESS: 0
BACK PAIN: 1
ABDOMINAL PAIN: 0
WHEEZING: 0
CONSTIPATION: 0

## 2022-09-20 ASSESSMENT — SOCIAL DETERMINANTS OF HEALTH (SDOH): HOW HARD IS IT FOR YOU TO PAY FOR THE VERY BASICS LIKE FOOD, HOUSING, MEDICAL CARE, AND HEATING?: NOT HARD AT ALL

## 2022-09-20 NOTE — PROGRESS NOTES
Chief Complaint   Patient presents with    Follow-up     4 Month      Medication Reaction     Pt wants to see about stopping her statin, states that she has a lot of the side effects, like Headaches, diarrhea, fatigue, SOB, Difficulty in walking (Also could be related to stroke) Pt has been taking her husbands loperamide for diarrhea every other day and it seems to keep the Diarrhea at [address]. Other     Pt is wondering if she has a B-12 Deficiency, also could CO Q 10 help with her symptoms (Fatigue)     History of presenting illness:  Grover Valerio is a64 y.o. female who presents today for follow up on her chronic medical conditions as noted below.     Patient Active Problem List    Diagnosis Date Noted    Acute CVA (cerebrovascular accident) ([facility] 06/03/2021    GERD (gastroesophageal reflux disease)     Non-rheumatic mitral regurgitation 11/16/2017     Overview Note:     2014 mil/ mod      Pure hypercholesterolemia 11/16/2017    IFG (impaired fasting glucose) 11/16/2017    Acquired hypothyroidism 11/16/2017    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    B12 deficiency 11/16/2017    Vitamin D deficiency 11/16/2017    PAF (paroxysmal atrial fibrillation) ([facility] 09/07/2014     Overview Note:     9/6/2014  DCCV in ED  09/07/2014  Echo  Normal LVFX  09/07/2014  lexiscan negative for myocardial ischemia, EF 62 %            Personal history of breast cancer 08/20/2014    S/P lumpectomy, right breast 1/7/14 02/12/2014    Breast cancer ([facility] 01/2014     Overview Note:     right      Malignant neoplasm of other specified sites of female breast 12/09/2013     Past Medical History:   Diagnosis Date    Breast cancer ([facility] 1/2014    right    GERD (gastroesophageal reflux disease)     History of therapeutic radiation     Hyperlipidemia     Thyroid disease     Wears glasses       Past Surgical History:   Procedure Laterality Date    BREAST SURGERY Bilateral 1985    FIBROIDS REMOVED FROM [facility] BREAST    BREAST SURGERY Right 01/07/2014 for chest pain, palpitations and leg swelling. Gastrointestinal:  Negative for abdominal pain and constipation. Genitourinary:  Negative for dysuria and urgency. Musculoskeletal:  Positive for arthralgias and back pain. Skin:  Negative for rash. Neurological:  Negative for dizziness and headaches. Psychiatric/Behavioral: Negative. Vitals:    09/20/22 1130   BP: 128/78   Site: Left Upper Arm   Position: Sitting   Cuff Size: Large Adult   Pulse: 71   Resp: 18   SpO2: 97%   Weight: 204 lb (92.5 kg)   Height: 5' (1.524 m)     Body mass index is 39.84 kg/m². Physical Exam  Constitutional:       Appearance: She is well-developed. HENT:      Right Ear: External ear normal.      Left Ear: External ear normal.      Mouth/Throat:      Pharynx: No oropharyngeal exudate. Eyes:      Conjunctiva/sclera: Conjunctivae normal.      Pupils: Pupils are equal, round, and reactive to light. Neck:      Thyroid: No thyromegaly. Vascular: No JVD. Cardiovascular:      Rate and Rhythm: Normal rate. Heart sounds: Normal heart sounds. No murmur heard. Pulmonary:      Effort: No respiratory distress. Breath sounds: Normal breath sounds. No wheezing or rales. Chest:      Chest wall: No tenderness. Abdominal:      General: Bowel sounds are normal.      Palpations: Abdomen is soft. Musculoskeletal:      Cervical back: Neck supple. Lymphadenopathy:      Cervical: No cervical adenopathy. Skin:     Findings: No rash.        Lab Review   Orders Only on 09/20/2022   Component Date Value    T4 Free 09/20/2022 1.54     TSH 09/20/2022 5.090 (A)    Vitamin B-12 09/20/2022 1569 (A)    Vit D, 25-Hydroxy 09/20/2022 31.5     Sodium 09/20/2022 141     Potassium 09/20/2022 3.9     Chloride 09/20/2022 106     CO2 09/20/2022 25     Anion Gap 09/20/2022 10     Glucose 09/20/2022 97     BUN 09/20/2022 10     Creatinine 09/20/2022 0.6     GFR Non- 09/20/2022 >60     GFR  09/20/2022 >59     Calcium 09/20/2022 9.3     Total Protein 09/20/2022 6.7     Albumin 09/20/2022 4.1     Total Bilirubin 09/20/2022 0.7     Alkaline Phosphatase 09/20/2022 71     ALT 09/20/2022 18     AST 09/20/2022 22     Hemoglobin A1C 09/20/2022 5.5            ASSESSMENT/PLAN:        Pure hypercholesterolemia-   I have personally reviewed and interpreted these lab results and thoroughly discussed with patient  LDL is 79   Rx Crestor 10 mg p.o. daily  Healthy, mostly fiber rich nonstarchy plant-based diet recommended  Recommend to decrease intake of processed foods, simple carbohydrates and animal-based products that high in saturated fats  States has muscle aches   Wants to do trial without statin to see if this help  If sx resolve- may try different statin     Cerebrovascular disease  Post CVA 2021  No agreeable to statin as above  Rx Crestor 10 mg p.o. daily  Rx aspirin 81 mg      PAF  Remains on Eliquis 5 mg and testerone and follows with cardiology     Acquired hypothyroidism-her thyroid levels =  Results reviewed + dw pt  TSH is 5 and free T4 is 1.54  RX Synthroid 75 µg daily-x6 days and 1.5 tablets 1 day a week     B12 deficiency   B12 level currently is 1354  Lab discussed with patient  RX 1 MG B12 DAILY PO     IFG (impaired fasting glucose)  OBESITY  = Hemoglobin A1c is improved at 5.5 in 9/2022  Previously 5.7 January 2022  strict lower carbohydrate diet and weight loss is recommended  Pt was given approximately 5 minutes of counseling about diet and exercise including education on what calories are, where calories come from, the need for portion control,following lower carbohydrate dietary regimen and healthy snacks along side an active lifestyle with supplementary exercise of approx 30 minutes a week, 5 days a week of exercise for weight loss. The patient voiced increased understanding of the topics discussed.        PAF (paroxysmal atrial fibrillation) ([facility]- no further episdoes  Monitor closely  Vitamin D deficiency-   level is 31  RX vit D increase to  4000 iu daily  Lab reviewed with pt     Exogenous obesity  Pt was given approximately 5 minutes of counseling about diet and exercise including education on what calories are, where calories come from, the need for portion control,following lower carbohydrate dietary regimen and healthy snacks along side an active lifestyle with supplementary exercise of approx 30 minutes a week, 5 days a week of exercise for weight loss. The patient voiced increased understanding of the topics discussed. Chronic diarrhrea ongoing for over a year  She tried her 's Lomotil and it was helpful  Suggest she takes Imodium as needed basis  If not better she will let me know  Orders Placed This Encounter   Procedures    CBC with Auto Differential    Comprehensive Metabolic Panel    Hemoglobin A1C    Lipid Panel    Urinalysis    TSH    Vitamin D 25 Hydroxy    Vitamin B12     New Prescriptions    No medications on file         No follow-ups on file. There are no Patient Instructions on file for this visit. EMR Dragon/transcription disclaimer:Significant part of this  encounter note is electronic transcription/translationof spoken language to printed text. The electronic translation of spoken language may be erroneous, or at times, nonsensical words or phrases may be inadvertently transcribed.  Although I have reviewed the note for sucherrors, some may still exist.

## 2022-09-23 ENCOUNTER — TELEPHONE (OUTPATIENT)
Dept: CARDIOLOGY CLINIC | Age: 77
End: 2022-09-23

## 2022-09-26 ENCOUNTER — OFFICE VISIT (OUTPATIENT)
Dept: CARDIOLOGY CLINIC | Age: 77
End: 2022-09-26
Payer: MEDICARE

## 2022-09-26 VITALS
WEIGHT: 205 LBS | DIASTOLIC BLOOD PRESSURE: 84 MMHG | BODY MASS INDEX: 34.16 KG/M2 | OXYGEN SATURATION: 97 % | HEIGHT: 65 IN | HEART RATE: 63 BPM | SYSTOLIC BLOOD PRESSURE: 136 MMHG

## 2022-09-26 DIAGNOSIS — I48.0 PAF (PAROXYSMAL ATRIAL FIBRILLATION) (HCC): Primary | ICD-10-CM

## 2022-09-26 PROCEDURE — 1123F ACP DISCUSS/DSCN MKR DOCD: CPT | Performed by: INTERNAL MEDICINE

## 2022-09-26 PROCEDURE — G8399 PT W/DXA RESULTS DOCUMENT: HCPCS | Performed by: INTERNAL MEDICINE

## 2022-09-26 PROCEDURE — 99214 OFFICE O/P EST MOD 30 MIN: CPT | Performed by: INTERNAL MEDICINE

## 2022-09-26 PROCEDURE — 93000 ELECTROCARDIOGRAM COMPLETE: CPT | Performed by: INTERNAL MEDICINE

## 2022-09-26 PROCEDURE — G8417 CALC BMI ABV UP PARAM F/U: HCPCS | Performed by: INTERNAL MEDICINE

## 2022-09-26 PROCEDURE — G8427 DOCREV CUR MEDS BY ELIG CLIN: HCPCS | Performed by: INTERNAL MEDICINE

## 2022-09-26 PROCEDURE — 1036F TOBACCO NON-USER: CPT | Performed by: INTERNAL MEDICINE

## 2022-09-26 PROCEDURE — 1090F PRES/ABSN URINE INCON ASSESS: CPT | Performed by: INTERNAL MEDICINE

## 2022-09-26 RX ORDER — LOPERAMIDE HYDROCHLORIDE 2 MG/1
2 CAPSULE ORAL 4 TIMES DAILY PRN
COMMUNITY

## 2022-09-26 ASSESSMENT — ENCOUNTER SYMPTOMS
SHORTNESS OF BREATH: 0
BLOOD IN STOOL: 0
EYE REDNESS: 0
EYE PAIN: 0
DIARRHEA: 1
VOMITING: 0
ABDOMINAL PAIN: 0
FACIAL SWELLING: 0
NAUSEA: 0
CHEST TIGHTNESS: 0
WHEEZING: 0
CONSTIPATION: 0
COUGH: 0
EYE DISCHARGE: 0
APNEA: 0
ABDOMINAL DISTENTION: 0
SORE THROAT: 0

## 2022-09-26 NOTE — PROGRESS NOTES
Cardiology Office Visit Note  [address]  [id number]  Phone: [phone]  Fax: [phone]                            Date:  9/26/2022  Patient: Ryan Goldman  Age:  64 y.o., 1945    Referral: No ref. provider found    REASON FOR VISIT:  Follow-up (PAF (paroxysmal atrial fibrillation) ([facility]))         PROBLEM LIST:    Patient Active Problem List    Diagnosis Date Noted    Acute CVA (cerebrovascular accident) ([facility] 06/03/2021     Priority: Low    GERD (gastroesophageal reflux disease)      Priority: Low    Non-rheumatic mitral regurgitation 11/16/2017     Priority: Low     Overview Note:     2014 mil/ mod      Pure hypercholesterolemia 11/16/2017     Priority: Low    IFG (impaired fasting glucose) 11/16/2017     Priority: Low    Acquired hypothyroidism 11/16/2017     Priority: Low    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     Priority: Low    B12 deficiency 11/16/2017     Priority: Low    Vitamin D deficiency 11/16/2017     Priority: Low    PAF (paroxysmal atrial fibrillation) ([facility] 09/07/2014     Priority: Low     Overview Note:     9/6/2014  DCCV in ED  09/07/2014  Echo  Normal LVFX  09/07/2014  lexiscan negative for myocardial ischemia, EF 62 %            Personal history of breast cancer 08/20/2014     Priority: Low    S/P lumpectomy, right breast 1/7/14 02/12/2014     Priority: Low    Breast cancer ([facility] 01/2014     Priority: Low     Overview Note:     right      Malignant neoplasm of other specified sites of female breast 12/09/2013     Priority: Low         PRESENTATION: Ryan Goldman is a 64y.o. year old female seen today for a routine cardiology follow-up appointment. She was initially seen in consultation in March of this year for further evaluation of atrial fibrillation with history of shock therapy in September 2014. In 2021 she unfortunately had a cerebrovascular accident with persistent but improving left-sided numbness at that time.   During that incident hospitalization she did not have any  documented episodes of recurrent atrial fibrillation. She has however been on a combination of flecainide, Eliquis and metoprolol. EKG today shows maintenance of sinus rhythm with normal QTc interval.  She has not had any bleeds however has had nontraumatic falls related to gait instability. Recently she has been having recurrent diarrhea, fatigue, bilateral shoulder pain and memory loss. Symptoms possibly due to statin therapy which has been on hold over the last few days. Diarrhea has improved with over-the-counter medications. REVIEW OF SYSTEMS:  Review of Systems   Constitutional:  Positive for fatigue. Negative for chills and fever. HENT:  Negative for congestion, facial swelling, hearing loss and sore throat. Eyes:  Negative for pain, discharge, redness and visual disturbance. Respiratory:  Negative for apnea, cough, chest tightness, shortness of breath and wheezing. Cardiovascular:  Negative for chest pain, palpitations and leg swelling. Gastrointestinal:  Positive for diarrhea. Negative for abdominal distention, abdominal pain, blood in stool, constipation, nausea and vomiting. Endocrine: Negative for polydipsia, polyphagia and polyuria. Genitourinary:  Negative for dysuria, flank pain, frequency and hematuria. Musculoskeletal:  Negative for joint swelling, myalgias and neck pain. Skin:  Negative for pallor and rash. Neurological:  Positive for numbness. Negative for dizziness, syncope, speech difficulty, light-headedness and headaches. Psychiatric/Behavioral:  Negative for confusion, hallucinations and sleep disturbance.       Past Medical History:      Diagnosis Date    Breast cancer ([facility] 1/2014    right    GERD (gastroesophageal reflux disease)     History of therapeutic radiation     Hyperlipidemia     Thyroid disease     Wears glasses        Past Surgical History:      Procedure Laterality Date    BREAST SURGERY Bilateral 1985 FIBROIDS REMOVED FROM [facility] BREAST    BREAST SURGERY Right 01/07/2014    R partial mastectomy    BREAST SURGERY Right 01/22/2014    Rt JUAN R cath insertion    CARPAL TUNNEL RELEASE Left     EYE SURGERY      cataracts    HYSTERECTOMY (CERVIX STATUS UNKNOWN)      MELVINA AND BSO (CERVIX REMOVED)         Medications:  Current Outpatient Medications   Medication Sig Dispense Refill    loperamide (IMODIUM) 2 MG capsule Take 2 mg by mouth 4 times daily as needed for Diarrhea      DULoxetine (CYMBALTA) 30 MG extended release capsule Take 1 capsule by mouth in the morning. 30 capsule 5    levothyroxine (EUTHYROX) 75 MCG tablet Take 1 tablet by mouth once daily (Patient taking differently: Take 75 mcg by mouth Daily Take 1 tablet by mouth once daily except sundays take 1.5 tabs) 90 tablet 0    metoprolol succinate (TOPROL XL) 25 MG extended release tablet Take 0.5 tablets by mouth daily 45 tablet 1    ELIQUIS 5 MG TABS tablet Take 1 tablet by mouth 2 times daily 180 tablet 2    flecainide (TAMBOCOR) 150 MG tablet Take 1 tablet by mouth in the morning and at bedtime 180 tablet 2    Coenzyme Q10 (COQ10) 100 MG CAPS Take by mouth daily      vitamin B-12 (CYANOCOBALAMIN) 100 MCG tablet Take 50 mcg by mouth daily      latanoprost (XALATAN) 0.005 % ophthalmic solution Place 1 drop into both eyes nightly       Vitamin D (CHOLECALCIFEROL) 1000 UNITS CAPS capsule Take 4,000 Units by mouth daily      aspirin 81 MG tablet Take 81 mg by mouth daily. rosuvastatin (CRESTOR) 10 MG tablet Take 1 tablet by mouth once daily (Patient not taking: Reported on 9/26/2022) 90 tablet 1     No current facility-administered medications for this visit. Allergies:  Patient has no known allergies. Social History:  Social History     Occupational History    Not on file   Tobacco Use    Smoking status: Never    Smokeless tobacco: Never   Vaping Use    Vaping Use: Never used   Substance and Sexual Activity    Alcohol use: No    Drug use:  No Sexual activity: Never         Family History:       Problem Relation Age of Onset    Cancer Mother         throat    Cancer Maternal Grandmother         ovarian    Ovarian Cancer Maternal Grandmother     Parkinsonism Father     Breast Cancer Neg Hx          Physical Examination:  /84 (Site: Left Upper Arm, Position: Sitting)   Pulse 63   Ht 5' 5\" (1.651 m)   Wt 205 lb (93 kg)   SpO2 97%   BMI 34.11 kg/m²   Physical Exam  Vitals reviewed. Constitutional:       General: She is not in acute distress. Appearance: Normal appearance. She is not ill-appearing, toxic-appearing or diaphoretic. HENT:      Head: Normocephalic and atraumatic. Eyes:      General: No scleral icterus. Right eye: No discharge. Left eye: No discharge. Conjunctiva/sclera: Conjunctivae normal.   Neck:      Vascular: No carotid bruit. Cardiovascular:      Rate and Rhythm: Normal rate and regular rhythm. No extrasystoles are present. Chest Wall: PMI is not displaced. No thrill. Heart sounds: S1 normal and S2 normal. No murmur heard. No friction rub. No gallop. Pulmonary:      Effort: Pulmonary effort is normal. No tachypnea or respiratory distress. Breath sounds: Normal breath sounds. No stridor. No wheezing, rhonchi or rales. Chest:      Chest wall: No tenderness. Abdominal:      General: Bowel sounds are normal. There is no distension. Palpations: Abdomen is soft. There is no mass. Tenderness: There is no abdominal tenderness. There is no guarding. Musculoskeletal:         General: No swelling. Cervical back: Normal range of motion and neck supple. No rigidity. Right lower leg: No edema. Left lower leg: No edema. Skin:     General: Skin is warm and dry. Coloration: Skin is not jaundiced. Findings: No erythema or rash. Neurological:      General: No focal deficit present. Mental Status: She is alert and oriented to person, place, and time. Mental status is at baseline. Psychiatric:         Mood and Affect: Mood normal.         Behavior: Behavior normal.         Thought Content: Thought content normal.         Labs:   CBC: No results found for: CBC   BMP: No results found for: BMP    BNP: No results found for: BNPINT   PT/INR:   Protime   Date Value Ref Range Status   06/03/2021 12.8 12.0 - 14.6 sec Final     INR   Date Value Ref Range Status   06/03/2021 0.97 0.88 - 1.18 Final     Comment:     INR  < or = 1.3  Normal  INR = 2.0 - 3.0  Therapeutic  INR = 2.5 - 3.5  Therapeutic for patients with mechanical  prosthetic heart valve & MI prophylaxis  INR  > or = 3.5  Abnormal/Elevated  INR  > or = 5.0  Critical (requires immediate physician  notification)         APTT:    aPTT   Date Value Ref Range Status   06/03/2021 30.3 26.0 - 36.2 sec Final      CARDIAC ENZYMES:   Troponin   Date Value Ref Range Status   06/03/2021 <0.01 0.00 - 0.03 ng/mL Final     Comment:     <0.030 ng/ml       No measurable cardiac damage. 0.030-0.099 ng/ml  Values of troponin in this range suggest  possible myocardial damage. Repeat assay  4 to 6 hours after the current specimen.    >= 0.100 ng/ml     Indicative of myocardial damage. Recommend continued monitoring of  patient status and cardiac markers. 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 Assumed related to occult recurrence of atrial fibrillation.     Cardiomyopathy--mild to moderate left atrial enlargement. Ejection fraction 70%    Valvular heart disease--moderate tricuspid regurgitation    No known history of coronary artery disease (Lexiscan, 2016). Continue flecainide, metoprolol and Eliquis for thromboembolic risk reduction. Continue conservative treatment plan. Electronically signed by Dixon Hansen MD on 9/26/2022 at 3:22 Joel Joel MD, LIVIA, [facility]  Noninvasive Cardiology Consultant    This dictation was generated by voice recognition computer software. Although all attempts are made to edit the dictation for accuracy, there may be errors in the transcription that are not intended.

## 2022-10-13 RX ORDER — FLECAINIDE ACETATE 150 MG/1
150 TABLET ORAL 2 TIMES DAILY
Qty: 180 TABLET | Refills: 2 | Status: SHIPPED | OUTPATIENT
Start: 2022-10-13 | End: 2023-01-11

## 2022-10-14 RX ORDER — LEVOTHYROXINE SODIUM 0.07 MG/1
75 TABLET ORAL DAILY
Qty: 115 TABLET | Refills: 1 | Status: SHIPPED | OUTPATIENT
Start: 2022-10-14

## 2022-10-14 NOTE — TELEPHONE ENCOUNTER
Addison James called requesting a refill of the below medication which has been pended for you:     Requested Prescriptions     Pending Prescriptions Disp Refills    levothyroxine (SYNTHROID) 75 MCG tablet [Pharmacy Med Name: Levothyroxine Sodium 75 MCG Oral Tablet] 115 tablet 1     Sig: Take 1 tablet by mouth Daily Take 1 tablet by mouth once daily except sundays take 1.5 tabs       Last Appointment Date: 9/20/2022  Next Appointment Date: 1/23/2023    No Known Allergies

## 2023-01-30 ENCOUNTER — OFFICE VISIT (OUTPATIENT)
Dept: INTERNAL MEDICINE | Age: 78
End: 2023-01-30
Payer: MEDICARE

## 2023-01-30 VITALS
BODY MASS INDEX: 34.66 KG/M2 | RESPIRATION RATE: 18 BRPM | HEART RATE: 57 BPM | OXYGEN SATURATION: 96 % | DIASTOLIC BLOOD PRESSURE: 80 MMHG | HEIGHT: 65 IN | SYSTOLIC BLOOD PRESSURE: 122 MMHG | WEIGHT: 208 LBS

## 2023-01-30 DIAGNOSIS — E55.9 VITAMIN D DEFICIENCY: ICD-10-CM

## 2023-01-30 DIAGNOSIS — E53.8 B12 DEFICIENCY: ICD-10-CM

## 2023-01-30 DIAGNOSIS — I67.9 CEREBROVASCULAR DISEASE: ICD-10-CM

## 2023-01-30 DIAGNOSIS — M79.622 PAIN IN BOTH UPPER ARMS: ICD-10-CM

## 2023-01-30 DIAGNOSIS — Z12.31 ENCOUNTER FOR SCREENING MAMMOGRAM FOR BREAST CANCER: ICD-10-CM

## 2023-01-30 DIAGNOSIS — Z00.00 MEDICARE ANNUAL WELLNESS VISIT, SUBSEQUENT: Primary | ICD-10-CM

## 2023-01-30 DIAGNOSIS — E78.00 PURE HYPERCHOLESTEROLEMIA: ICD-10-CM

## 2023-01-30 DIAGNOSIS — M79.621 PAIN IN BOTH UPPER ARMS: ICD-10-CM

## 2023-01-30 DIAGNOSIS — I48.0 PAF (PAROXYSMAL ATRIAL FIBRILLATION) (HCC): ICD-10-CM

## 2023-01-30 DIAGNOSIS — E03.9 ACQUIRED HYPOTHYROIDISM: ICD-10-CM

## 2023-01-30 DIAGNOSIS — Z78.0 MENOPAUSE: ICD-10-CM

## 2023-01-30 DIAGNOSIS — K52.9 CHRONIC DIARRHEA: ICD-10-CM

## 2023-01-30 DIAGNOSIS — R73.01 IFG (IMPAIRED FASTING GLUCOSE): ICD-10-CM

## 2023-01-30 LAB
ALBUMIN SERPL-MCNC: 4.1 G/DL (ref 3.5–5.2)
ALP BLD-CCNC: 85 U/L (ref 35–104)
ALT SERPL-CCNC: 13 U/L (ref 5–33)
ANION GAP SERPL CALCULATED.3IONS-SCNC: 10 MMOL/L (ref 7–19)
AST SERPL-CCNC: 17 U/L (ref 5–32)
BASOPHILS ABSOLUTE: 0 K/UL (ref 0–0.2)
BASOPHILS RELATIVE PERCENT: 0.6 % (ref 0–1)
BILIRUB SERPL-MCNC: 0.6 MG/DL (ref 0.2–1.2)
BILIRUBIN URINE: NEGATIVE
BLOOD, URINE: NEGATIVE
BUN BLDV-MCNC: 10 MG/DL (ref 8–23)
CALCIUM SERPL-MCNC: 9.5 MG/DL (ref 8.8–10.2)
CHLORIDE BLD-SCNC: 106 MMOL/L (ref 98–111)
CHOLESTEROL, TOTAL: 172 MG/DL (ref 160–199)
CLARITY: ABNORMAL
CO2: 27 MMOL/L (ref 22–29)
COLOR: YELLOW
CREAT SERPL-MCNC: 0.7 MG/DL (ref 0.5–0.9)
EOSINOPHILS ABSOLUTE: 0.3 K/UL (ref 0–0.6)
EOSINOPHILS RELATIVE PERCENT: 3.5 % (ref 0–5)
EPITHELIAL CELLS, UA: ABNORMAL /HPF
GFR SERPL CREATININE-BSD FRML MDRD: >60 ML/MIN/{1.73_M2}
GLUCOSE BLD-MCNC: 102 MG/DL (ref 74–109)
GLUCOSE URINE: NEGATIVE MG/DL
HBA1C MFR BLD: 5.4 % (ref 4–6)
HCT VFR BLD CALC: 44.7 % (ref 37–47)
HDLC SERPL-MCNC: 73 MG/DL (ref 65–121)
HEMOGLOBIN: 14.7 G/DL (ref 12–16)
IMMATURE GRANULOCYTES #: 0 K/UL
KETONES, URINE: NEGATIVE MG/DL
LDL CHOLESTEROL CALCULATED: 73 MG/DL
LEUKOCYTE ESTERASE, URINE: ABNORMAL
LYMPHOCYTES ABSOLUTE: 1.7 K/UL (ref 1.1–4.5)
LYMPHOCYTES RELATIVE PERCENT: 23.2 % (ref 20–40)
MCH RBC QN AUTO: 32.2 PG (ref 27–31)
MCHC RBC AUTO-ENTMCNC: 32.9 G/DL (ref 33–37)
MCV RBC AUTO: 98 FL (ref 81–99)
MONOCYTES ABSOLUTE: 0.4 K/UL (ref 0–0.9)
MONOCYTES RELATIVE PERCENT: 5.9 % (ref 0–10)
NEUTROPHILS ABSOLUTE: 4.7 K/UL (ref 1.5–7.5)
NEUTROPHILS RELATIVE PERCENT: 66.5 % (ref 50–65)
NITRITE, URINE: NEGATIVE
PDW BLD-RTO: 12.6 % (ref 11.5–14.5)
PH UA: 6.5 (ref 5–8)
PLATELET # BLD: 255 K/UL (ref 130–400)
PMV BLD AUTO: 11.9 FL (ref 9.4–12.3)
POTASSIUM SERPL-SCNC: 4.1 MMOL/L (ref 3.5–5)
PROTEIN UA: NEGATIVE MG/DL
RBC # BLD: 4.56 M/UL (ref 4.2–5.4)
RBC UA: ABNORMAL /HPF (ref 0–2)
SODIUM BLD-SCNC: 143 MMOL/L (ref 136–145)
SPECIFIC GRAVITY UA: 1.02 (ref 1–1.03)
TOTAL PROTEIN: 6.9 G/DL (ref 6.6–8.7)
TRIGL SERPL-MCNC: 129 MG/DL (ref 0–149)
TSH SERPL DL<=0.05 MIU/L-ACNC: 2.82 UIU/ML (ref 0.27–4.2)
UROBILINOGEN, URINE: 1 E.U./DL
VITAMIN B-12: 1254 PG/ML (ref 211–946)
VITAMIN D 25-HYDROXY: 29.9 NG/ML
WBC # BLD: 7.1 K/UL (ref 4.8–10.8)
WBC UA: ABNORMAL /HPF (ref 0–5)
YEAST: PRESENT /HPF

## 2023-01-30 PROCEDURE — G0439 PPPS, SUBSEQ VISIT: HCPCS | Performed by: INTERNAL MEDICINE

## 2023-01-30 PROCEDURE — 1123F ACP DISCUSS/DSCN MKR DOCD: CPT | Performed by: INTERNAL MEDICINE

## 2023-01-30 PROCEDURE — 99214 OFFICE O/P EST MOD 30 MIN: CPT | Performed by: INTERNAL MEDICINE

## 2023-01-30 RX ORDER — APIXABAN 5 MG/1
TABLET, FILM COATED ORAL
Qty: 180 TABLET | Refills: 3 | Status: SHIPPED | OUTPATIENT
Start: 2023-01-30

## 2023-01-30 RX ORDER — LOPERAMIDE HYDROCHLORIDE 2 MG/1
2 CAPSULE ORAL DAILY PRN
Qty: 90 CAPSULE | Refills: 1 | Status: SHIPPED | OUTPATIENT
Start: 2023-01-30 | End: 2023-04-30

## 2023-01-30 ASSESSMENT — ENCOUNTER SYMPTOMS
COUGH: 0
CONSTIPATION: 0
CHEST TIGHTNESS: 0
DIARRHEA: 1
SORE THROAT: 0
ABDOMINAL PAIN: 0
WHEEZING: 0

## 2023-01-30 ASSESSMENT — PATIENT HEALTH QUESTIONNAIRE - PHQ9
2. FEELING DOWN, DEPRESSED OR HOPELESS: 0
SUM OF ALL RESPONSES TO PHQ QUESTIONS 1-9: 0
SUM OF ALL RESPONSES TO PHQ QUESTIONS 1-9: 0
1. LITTLE INTEREST OR PLEASURE IN DOING THINGS: 0
SUM OF ALL RESPONSES TO PHQ QUESTIONS 1-9: 0
SUM OF ALL RESPONSES TO PHQ QUESTIONS 1-9: 0
SUM OF ALL RESPONSES TO PHQ9 QUESTIONS 1 & 2: 0

## 2023-01-30 ASSESSMENT — LIFESTYLE VARIABLES: HOW MANY STANDARD DRINKS CONTAINING ALCOHOL DO YOU HAVE ON A TYPICAL DAY: PATIENT DOES NOT DRINK

## 2023-01-30 NOTE — PROGRESS NOTES
Chief Complaint   Patient presents with    Multiple medical problems    Diarrhea     PT states that she has had Diarrhea for a long time and wants to see about getting some medication for this      History of presenting illness:  Cleophas Fraction is a64 y.o. female who presents today for follow up on her chronic medical conditions as noted below.       Patient Active Problem List    Diagnosis Date Noted    Acute CVA (cerebrovascular accident) ([facility] 06/03/2021    GERD (gastroesophageal reflux disease)     Non-rheumatic mitral regurgitation 11/16/2017     Overview Note:     2014 mil/ mod      Pure hypercholesterolemia 11/16/2017    IFG (impaired fasting glucose) 11/16/2017    Acquired hypothyroidism 11/16/2017    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    B12 deficiency 11/16/2017    Vitamin D deficiency 11/16/2017    PAF (paroxysmal atrial fibrillation) ([facility] 09/07/2014     Overview Note:     9/6/2014  DCCV in ED  09/07/2014  Echo  Normal LVFX  09/07/2014  lexiscan negative for myocardial ischemia, EF 62 %            Personal history of breast cancer 08/20/2014    S/P lumpectomy, right breast 1/7/14 02/12/2014    Breast cancer ([facility] 01/2014     Overview Note:     right      Malignant neoplasm of other specified sites of female breast 12/09/2013     Past Medical History:   Diagnosis Date    Breast cancer ([facility] 1/2014    right    GERD (gastroesophageal reflux disease)     History of therapeutic radiation     Hyperlipidemia     Thyroid disease     Wears glasses       Past Surgical History:   Procedure Laterality Date    BREAST SURGERY Bilateral 1985    FIBROIDS REMOVED FROM [facility] BREAST    BREAST SURGERY Right 01/07/2014    R partial mastectomy    BREAST SURGERY Right 01/22/2014    Rt JUAN R cath insertion    CARPAL TUNNEL RELEASE Left     EYE SURGERY      cataracts    HYSTERECTOMY (CERVIX STATUS UNKNOWN)      MELVINA AND BSO (CERVIX REMOVED)       Current Outpatient Medications   Medication Sig Dispense Refill    loperamide (RA ANTI-DIARRHEAL) 2 MG capsule Take 1 capsule by mouth daily as needed for Diarrhea 90 capsule 1    levothyroxine (SYNTHROID) 75 MCG tablet Take 1 tablet by mouth Daily Take 1 tablet by mouth once daily except sundays take 1.5 tabs 115 tablet 1    flecainide (TAMBOCOR) 150 MG tablet Take 1 tablet by mouth in the morning and at bedtime 180 tablet 2    loperamide (IMODIUM) 2 MG capsule Take 2 mg by mouth 4 times daily as needed for Diarrhea      DULoxetine (CYMBALTA) 30 MG extended release capsule Take 1 capsule by mouth in the morning. 30 capsule 5    metoprolol succinate (TOPROL XL) 25 MG extended release tablet Take 0.5 tablets by mouth daily 45 tablet 1    rosuvastatin (CRESTOR) 10 MG tablet Take 1 tablet by mouth once daily (Patient taking differently: Every other Day) 90 tablet 1    ELIQUIS 5 MG TABS tablet Take 1 tablet by mouth 2 times daily 180 tablet 2    Coenzyme Q10 (COQ10) 100 MG CAPS Take by mouth daily      vitamin B-12 (CYANOCOBALAMIN) 100 MCG tablet Take 50 mcg by mouth daily      latanoprost (XALATAN) 0.005 % ophthalmic solution Place 1 drop into both eyes nightly       Vitamin D (CHOLECALCIFEROL) 1000 UNITS CAPS capsule Take 4,000 Units by mouth daily      aspirin 81 MG tablet Take 81 mg by mouth daily. No current facility-administered medications for this visit. No Known Allergies  Social History     Tobacco Use    Smoking status: Never    Smokeless tobacco: Never   Substance Use Topics    Alcohol use: No      Family History   Problem Relation Age of Onset    Cancer Mother         throat    Cancer Maternal Grandmother         ovarian    Ovarian Cancer Maternal Grandmother     Parkinsonism Father     Breast Cancer Neg Hx        Review of Systems   Constitutional:  Positive for fatigue. Negative for chills and fever. HENT:  Negative for congestion, ear pain, nosebleeds, postnasal drip and sore throat. Respiratory:  Negative for cough, chest tightness and wheezing.     Cardiovascular: Negative for chest pain, palpitations and leg swelling. Gastrointestinal:  Positive for diarrhea. Negative for abdominal pain and constipation. Genitourinary:  Negative for dysuria and urgency. Musculoskeletal:  Positive for arthralgias. Skin:  Negative for rash. Neurological:  Negative for dizziness and headaches. Psychiatric/Behavioral: Negative. Vitals:    01/30/23 1313   BP: 122/80   Site: Left Upper Arm   Position: Sitting   Cuff Size: Large Adult   Pulse: 57   Resp: 18   SpO2: 96%   Weight: 208 lb (94.3 kg)   Height: 5' 5\" (1.651 m)     Body mass index is 34.61 kg/m². Physical Exam  Constitutional:       Appearance: She is well-developed. HENT:      Right Ear: External ear normal.      Left Ear: External ear normal.      Mouth/Throat:      Pharynx: No oropharyngeal exudate. Eyes:      Conjunctiva/sclera: Conjunctivae normal.      Pupils: Pupils are equal, round, and reactive to light. Neck:      Thyroid: No thyromegaly. Vascular: No JVD. Cardiovascular:      Rate and Rhythm: Normal rate. Heart sounds: Normal heart sounds. No murmur heard. Pulmonary:      Effort: No respiratory distress. Breath sounds: Rhonchi present. No wheezing or rales. Chest:      Chest wall: No tenderness. Abdominal:      General: Bowel sounds are normal.      Palpations: Abdomen is soft. Musculoskeletal:      Cervical back: Neck supple. Lymphadenopathy:      Cervical: No cervical adenopathy. Skin:     Findings: No rash. Neurological:      Mental Status: She is oriented to person, place, and time.        Lab Review   Orders Only on 01/30/2023   Component Date Value    Vitamin B-12 01/30/2023 1254 (A)     Vit D, 25-Hydroxy 01/30/2023 29.9 (A)     TSH 01/30/2023 2.820     Color, UA 01/30/2023 YELLOW     Clarity, UA 01/30/2023 CLOUDY (A)     Glucose, Ur 01/30/2023 Negative     Bilirubin Urine 01/30/2023 Negative     Ketones, Urine 01/30/2023 Negative     Specific Gravity, UA 01/30/2023 1.016     Blood, Urine 01/30/2023 Negative     pH, UA 01/30/2023 6.5     Protein, UA 01/30/2023 Negative     Urobilinogen, Urine 01/30/2023 1.0     Nitrite, Urine 01/30/2023 Negative     Leukocyte Esterase, Urine 01/30/2023 TRACE (A)     Cholesterol, Total 01/30/2023 172     Triglycerides 01/30/2023 129     HDL 01/30/2023 73     LDL Calculated 01/30/2023 73     Hemoglobin A1C 01/30/2023 5.4     Sodium 01/30/2023 143     Potassium 01/30/2023 4.1     Chloride 01/30/2023 106     CO2 01/30/2023 27     Anion Gap 01/30/2023 10     Glucose 01/30/2023 102     BUN 01/30/2023 10     Creatinine 01/30/2023 0.7     Est, Glom Filt Rate 01/30/2023 >60     Calcium 01/30/2023 9.5     Total Protein 01/30/2023 6.9     Albumin 01/30/2023 4.1     Total Bilirubin 01/30/2023 0.6     Alkaline Phosphatase 01/30/2023 85     ALT 01/30/2023 13     AST 01/30/2023 17     WBC 01/30/2023 7.1     RBC 01/30/2023 4.56     Hemoglobin 01/30/2023 14.7     Hematocrit 01/30/2023 44.7     MCV 01/30/2023 98.0     MCH 01/30/2023 32.2 (A)     MCHC 01/30/2023 32.9 (A)     RDW 01/30/2023 12.6     Platelets 80/02/3538 255     MPV 01/30/2023 11.9     Neutrophils % 01/30/2023 66.5 (A)     Lymphocytes % 01/30/2023 23.2     Monocytes % 01/30/2023 5.9     Eosinophils % 01/30/2023 3.5     Basophils % 01/30/2023 0.6     Neutrophils Absolute 01/30/2023 4.7     Immature Granulocytes # 01/30/2023 0.0     Lymphocytes Absolute 01/30/2023 1.7     Monocytes Absolute 01/30/2023 0.40     Eosinophils Absolute 01/30/2023 0.30     Basophils Absolute 01/30/2023 0.00     WBC, UA 01/30/2023 2-4     RBC, UA 01/30/2023 3-5 (A)     Epithelial Cells, UA 01/30/2023 5-10     Yeast, UA 01/30/2023 Present (A)            ASSESSMENT/PLAN:    Pure hypercholesterolemia-   I have personally reviewed and interpreted these lab results and thoroughly discussed with patient  LDL is 73  Rx Crestor 10 mg p.o. daily  Healthy, mostly fiber rich nonstarchy plant-based diet recommended  Recommend to decrease intake of processed foods, simple carbohydrates and animal-based products that high in saturated fats  States has muscle aches   Wants to do trial without statin to see if this help  If sx resolve- may try different statin     Cerebrovascular disease  Post CVA 2021  No agreeable to statin as above  Rx Crestor 10 mg p.o. daily  Rx aspirin 81 mg      PAF  Remains on Eliquis 5 mg and testerone and follows with cardiology     Acquired hypothyroidism-her thyroid levels =  Results reviewed + dw pt  TSH is 2.8  RX Synthroid 75 µg daily-x6 days and 1.5 tablets 1 day a week     B12 deficiency   B12 level currently is 1254  Lab discussed with patient  RX 1 MG B12 DAILY PO     IFG (impaired fasting glucose)  OBESITY  = Hemoglobin A1c is 5.4  strict lower carbohydrate diet and weight loss is recommended  Pt was given approximately 5 minutes of counseling about diet and exercise including education on what calories are, where calories come from, the need for portion control,following lower carbohydrate dietary regimen and healthy snacks along side an active lifestyle with supplementary exercise of approx 30 minutes a week, 5 days a week of exercise for weight loss. The patient voiced increased understanding of the topics discussed. PAF (paroxysmal atrial fibrillation) ([facility]- no further episdoes  Monitor closely  Vitamin D deficiency-   level is 29 - low  RX vit D increase to  4000 iu daily  Lab reviewed with pt     Exogenous obesity  Pt was given approximately 5 minutes of counseling about diet and exercise including education on what calories are, where calories come from, the need for portion control,following lower carbohydrate dietary regimen and healthy snacks along side an active lifestyle with supplementary exercise of approx 30 minutes a week, 5 days a week of exercise for weight loss. The patient voiced increased understanding of the topics discussed.      Chronic diarrhrea ongoing for over a year  RX Loperamide 2 mg   RX sent in    Pain upper arms  Suggest tylenol hs  If sx not improving and resolving , if sx continue or re-occur pt has been instructed to call us and / or return here for follow- up evaluation          Orders Placed This Encounter   Procedures    ANTHONY DIGITAL SCREEN W OR WO CAD BILATERAL    DEXA BONE DENSITY 2 SITES    Comprehensive Metabolic Panel    Hemoglobin A1C    Lipid Panel    TSH    T4, Free    Vitamin D 25 Hydroxy     New Prescriptions    LOPERAMIDE (RA ANTI-DIARRHEAL) 2 MG CAPSULE    Take 1 capsule by mouth daily as needed for Diarrhea         Return for Medicare Annual Wellness Visit in 1 year. EMR Dragon/transcription disclaimer:Significant part of this  encounter note is electronic transcription/translationof spoken language to printed text. The electronic translation of spoken language may be erroneous, or at times, nonsensical words or phrases may be inadvertently transcribed.  Although I have reviewed the note for sucherrors, some may still exist.

## 2023-01-30 NOTE — PATIENT INSTRUCTIONS

## 2023-01-30 NOTE — PROGRESS NOTES
Medicare Annual Wellness Visit    Amaya Hoffman is here for Medicare AWV and Diarrhea (PT states that she has had Diarrhea for a long time and wants to see about getting some medication for this )     Recommendations for Preventive Services Due: see orders and patient instructions/AVS.  Recommended screening schedule for the next 5-10 years is provided to the patient in written form: see Patient Instructions/AVS.     Return for Medicare Annual Wellness Visit in 1 year. Subjective     Patient's complete Health Risk Assessment and screening values have been reviewed and are found in Flowsheets. The following problems were reviewed today and where indicated follow up appointments were made and/or referrals ordered. Positive Risk Factor Screenings with Interventions:                 Weight and Activity:  Physical Activity: Inactive    Days of Exercise per Week: 0 days    Minutes of Exercise per Session: 0 min     On average, how many days per week do you engage in moderate to strenuous exercise (like a brisk walk)?: 0 days  Have you lost any weight without trying in the past 3 months?: No  Body mass index: (!) 34.61      Inactivity Interventions:Healthy, mostly fiber rich nonstarchy plant-based diet recommended  Recommend to decrease intake of processed foods, simple carbohydrates and animal-based products that high in saturated fats  Obesity Interventions:  low carbohydrate diet                           Objective   Vitals:    01/30/23 1313   BP: 122/80   Site: Left Upper Arm   Position: Sitting   Cuff Size: Large Adult   Pulse: 57   Resp: 18   SpO2: 96%   Weight: 208 lb (94.3 kg)   Height: 5' 5\" (1.651 m)      Body mass index is 34.61 kg/m². No Known Allergies  Prior to Visit Medications    Medication Sig Taking?  Authorizing Provider   levothyroxine (SYNTHROID) 75 MCG tablet Take 1 tablet by mouth Daily Take 1 tablet by mouth once daily except sundays take 1.5 tabs Yes Maria Antonia Quintero MD flecainide (TAMBOCOR) 150 MG tablet Take 1 tablet by mouth in the morning and at bedtime Yes JAILYN Jacobo   loperamide (IMODIUM) 2 MG capsule Take 2 mg by mouth 4 times daily as needed for Diarrhea Yes Historical Provider, MD   DULoxetine (CYMBALTA) 30 MG extended release capsule Take 1 capsule by mouth in the morning. Yes Omer De La Rosa MD   metoprolol succinate (TOPROL XL) 25 MG extended release tablet Take 0.5 tablets by mouth daily Yes JAILYN Jacobo   rosuvastatin (CRESTOR) 10 MG tablet Take 1 tablet by mouth once daily  Patient taking differently: Every other Day Yes Todd Hernandez MD   ELIQUIS 5 MG TABS tablet Take 1 tablet by mouth 2 times daily Yes Merle Moran MD   Coenzyme Q10 (COQ10) 100 MG CAPS Take by mouth daily Yes Historical Provider, MD   vitamin B-12 (CYANOCOBALAMIN) 100 MCG tablet Take 50 mcg by mouth daily Yes Historical Provider, MD   latanoprost (XALATAN) 0.005 % ophthalmic solution Place 1 drop into both eyes nightly  Yes Historical Provider, MD   Vitamin D (CHOLECALCIFEROL) 1000 UNITS CAPS capsule Take 4,000 Units by mouth daily Yes Historical Provider, MD   aspirin 81 MG tablet Take 81 mg by mouth daily. Yes Historical Provider, MD Gomes (Including outside providers/suppliers regularly involved in providing care):   Patient Care Team:  Todd Hernandez MD as PCP - General (Internal Medicine)  Todd Hernandez MD [facility]  Oj Heller (Radiology)  Omer De La Rosa MD as Consulting Physician (Neurology)  Merle Moran MD as Consulting Physician (Cardiology)     Reviewed and updated this visit:  Tobacco  Allergies  Med Hx  Surg Hx  Soc Hx  Fam Hx         MEDICARE WELLNESS SCREENING/ MAINTENANCE:  1. MAMMOGRAM:As per Dr. Eduar Obregon  2. SCREENING CSCOPE 2011, repeat 10 years  Patient now over 73  Colonoscopy 2011 normal     3. BONE DENSITY SCREENIN normal, repeat 5 years  4. PAP SCREENING:na  5.  DIABETES SCREEN - FBS DONE/ ABOVE LABS  6. CARDIOVASCULAR SCREENING- LIPID PANEL  DONE/ ABOVE LABS  7. PNEUMONIA VACCINATION- prevanr 2017/ PPSV 23 12/18  8. INFLUENZA VACCINATION: TO BE DONE IN FALL- refuses  9. HEP B VACCINATION- PT DECLINES  10. HIV/ HEP SCREENING- NA  11. OPTOMETRY/OPTHALMOLOGY APPOINTMENT SUGGESTED FOR GLAUCOMA SCREENING has appt to see dr Majano Ban:  1. HEALTHY DIET:lower insaturated fats and free sugars  2. EXERCISE suggest at least 30-50 min dialy  3. NO INCREASED FALL RISK ON EXAM       Patient Instructions        Learning About Being Active as an Older Adult  Why is being active important as you get older? Being active is one of the best things you can do for your health. And it's never too late to start. Being active--or getting active, if you aren't already--has definite benefits. It can:  Give you more energy,  Keep your mind sharp. Improve balance to reduce your risk of falls. Help you manage chronic illness with fewer medicines. No matter how old you are, how fit you are, or what health problems you have, there is a form of activity that will work for you. And the more physical activity you can do, the better your overall health will be. What kinds of activity can help you stay healthy? Being more active will make your daily activities easier. Physical activity includes planned exercise and things you do in daily life. There are four types of activity:  Aerobic. Doing aerobic activity makes your heart and lungs strong. Includes walking, dancing, and gardening. Aim for at least 2½ hours spread throughout the week. It improves your energy and can help you sleep better. Muscle-strengthening. This type of activity can help maintain muscle and strengthen bones. Includes climbing stairs, using resistance bands, and lifting or carrying heavy loads. Aim for at least twice a week. It can help protect the knees and other joints. Stretching.   Stretching gives you better range of motion in joints and muscles. Includes upper arm stretches, calf stretches, and gentle yoga. Aim for at least twice a week, preferably after your muscles are warmed up from other activities. It can help you function better in daily life. Balancing. This helps you stay coordinated and have good posture. Includes heel-to-toe walking, shade chi, and certain types of yoga. Aim for at least 3 days a week. It can reduce your risk of falling. Even if you have a hard time meeting the recommendations, it's better to be more active than less active. All activity done in each category counts toward your weekly total. You'd be surprised how daily things like carrying groceries, keeping up with grandchildren, and taking the stairs can add up. What keeps you from being active? If you've had a hard time being more active, you're not alone. Maybe you remember being able to do more. Or maybe you've never thought of yourself as being active. It's frustrating when you can't do the things you want. Being more active can help. What's holding you back? Getting started. Have a goal, but break it into easy tasks. Small steps build into big accomplishments. Staying motivated. If you feel like skipping your activity, remember your goal. Maybe you want to move better and stay independent. Every activity gets you one step closer. Not feeling your best.  Start with 5 minutes of an activity you enjoy. Prove to yourself you can do it. As you get comfortable, increase your time. You may not be where you want to be. But you're in the process of getting there. Everyone starts somewhere. How can you find safe ways to stay active? Talk with your doctor about any physical challenges you're facing. Make a plan with your doctor if you have a health problem or aren't sure how to get started with activity. If you're already active, ask your doctor if there is anything you should change to stay safe as your body and health change.   If you tend to feel dizzy after you take medicine, avoid activity at that time. Try being active before you take your medicine. This will reduce your risk of falls. If you plan to be active at home, make sure to clear your space before you get started. Remove things like TV cords, coffee tables, and throw rugs. It's safest to have plenty of space to move freely. The key to getting more active is to take it slow and steady. Try to improve only a little bit at a time. Pick just one area to improve on at first. And if an activity hurts, stop and talk to your doctor. Where can you learn more? Go to http://www.hollis.com/ and enter P600 to learn more about \"Learning About Being Active as an Older Adult. \"  Current as of: October 10, 2022               Content Version: 13.5  © 8551-4164 Healthwise, Incorporated. Care instructions adapted under license by [facility] ([facility]). If you have questions about a medical condition or this instruction, always ask your healthcare professional. [place] [address] any warranty or liability for your use of this information. Advance Directives: Care Instructions  Overview  An advance directive is a legal way to state your wishes at the end of your life. It tells your family and your doctor what to do if you can't say what you want. There are two main types of advance directives. You can change them any time your wishes change. Living will. This form tells your family and your doctor your wishes about life support and other treatment. The form is also called a declaration. Medical power of . This form lets you name a person to make treatment decisions for you when you can't speak for yourself. This person is called a health care agent (health care proxy, health care surrogate). The form is also called a durable power of  for health care.   If you do not have an advance directive, decisions about your medical care may be made by a family member, or by a doctor or a  who doesn't know you. It may help to think of an advance directive as a gift to the people who care for you. If you have one, they won't have to make tough decisions by themselves. For more information, including forms for your state, see the [address] website (www.caringinfo.org/planning/advance-directives/). Follow-up care is a key part of your treatment and safety. Be sure to make and go to all appointments, and call your doctor if you are having problems. It's also a good idea to know your test results and keep a list of the medicines you take. What should you include in an advance directive? Many states have a unique advance directive form. (It may ask you to address specific issues.) Or you might use a universal form that's approved by many states. If your form doesn't tell you what to address, it may be hard to know what to include in your advance directive. Use the questions below to help you get started. Who do you want to make decisions about your medical care if you are not able to? What life-support measures do you want if you have a serious illness that gets worse over time or can't be cured? What are you most afraid of that might happen? (Maybe you're afraid of having pain, losing your independence, or being kept alive by machines.)  Where would you prefer to die? (Your home? A hospital? A nursing home?)  Do you want to donate your organs when you die? Do you want certain Orthodox practices performed before you die? When should you call for help? Be sure to contact your doctor if you have any questions. Where can you learn more? Go to http://www.hollis.com/ and enter R264 to learn more about \"Advance Directives: Care Instructions. \"  Current as of: June 16, 2022               Content Version: 13.5  © 6437-1919 Healthwise, Incorporated. Care instructions adapted under license by [facility] ([facility]).  If you have questions about a medical condition or this instruction, always ask your healthcare professional. [place] [address] any warranty or liability for your use of this information. Personalized Preventive Plan for Quinn Molina - 1/30/2023  Medicare offers a range of preventive health benefits. Some of the tests and screenings are paid in full while other may be subject to a deductible, co-insurance, and/or copay. Some of these benefits include a comprehensive review of your medical history including lifestyle, illnesses that may run in your family, and various assessments and screenings as appropriate. After reviewing your medical record and screening and assessments performed today your provider may have ordered immunizations, labs, imaging, and/or referrals for you. A list of these orders (if applicable) as well as your Preventive Care list are included within your After Visit Summary for your review. Other Preventive Recommendations:    A preventive eye exam performed by an eye specialist is recommended every 1-2 years to screen for glaucoma; cataracts, macular degeneration, and other eye disorders. A preventive dental visit is recommended every 6 months. Try to get at least 150 minutes of exercise per week or 10,000 steps per day on a pedometer . Order or download the FREE \"Exercise & Physical Activity: Your Everyday Guide\" from [employer]. Call 4-146.464.4731 or search [employer] online. You need 0055-2111 mg of calcium and 2482-0018 IU of vitamin D per day. It is possible to meet your calcium requirement with diet alone, but a vitamin D supplement is usually necessary to meet this goal.  When exposed to the sun, use a sunscreen that protects against both UVA and UVB radiation with an SPF of 30 or greater. Reapply every 2 to 3 hours or after sweating, drying off with a towel, or swimming. Always wear a seat belt when traveling in a car.  Always wear a helmet when riding a bicycle or motorcycle.

## 2023-03-21 ENCOUNTER — HOSPITAL ENCOUNTER (OUTPATIENT)
Dept: WOMENS IMAGING | Age: 78
Discharge: HOME | End: 2023-03-21
Payer: MEDICARE

## 2023-03-21 DIAGNOSIS — Z78.0 MENOPAUSE: ICD-10-CM

## 2023-03-21 DIAGNOSIS — Z12.31 ENCOUNTER FOR SCREENING MAMMOGRAM FOR BREAST CANCER: ICD-10-CM

## 2023-03-21 PROCEDURE — 77080 DXA BONE DENSITY AXIAL: CPT | Performed by: RADIOLOGY

## 2023-03-21 PROCEDURE — 77080 DXA BONE DENSITY AXIAL: CPT

## 2023-03-21 PROCEDURE — 77063 BREAST TOMOSYNTHESIS BI: CPT

## 2023-03-21 PROCEDURE — 77067 SCR MAMMO BI INCL CAD: CPT | Performed by: RADIOLOGY

## 2023-03-27 ENCOUNTER — OFFICE VISIT (OUTPATIENT)
Dept: CARDIOLOGY CLINIC | Age: 78
End: 2023-03-27
Payer: MEDICARE

## 2023-03-27 VITALS
OXYGEN SATURATION: 97 % | BODY MASS INDEX: 34.66 KG/M2 | HEART RATE: 59 BPM | WEIGHT: 208 LBS | DIASTOLIC BLOOD PRESSURE: 78 MMHG | SYSTOLIC BLOOD PRESSURE: 118 MMHG | HEIGHT: 65 IN

## 2023-03-27 DIAGNOSIS — I20.8 ANGINA OF EFFORT (HCC): ICD-10-CM

## 2023-03-27 DIAGNOSIS — I48.0 PAF (PAROXYSMAL ATRIAL FIBRILLATION) (HCC): Primary | ICD-10-CM

## 2023-03-27 PROCEDURE — 1123F ACP DISCUSS/DSCN MKR DOCD: CPT | Performed by: INTERNAL MEDICINE

## 2023-03-27 PROCEDURE — 99214 OFFICE O/P EST MOD 30 MIN: CPT | Performed by: INTERNAL MEDICINE

## 2023-03-27 PROCEDURE — 93000 ELECTROCARDIOGRAM COMPLETE: CPT | Performed by: INTERNAL MEDICINE

## 2023-03-27 NOTE — PROGRESS NOTES
Cardiology Office Visit Note  [address]  [address]  Phone: [phone]  Fax: [phone]                            Date:  3/27/2023  Patient: Sara Gonzalez  Age:  76 y.o., 1945    Referral: No ref. provider found    REASON FOR VISIT:  Follow-up (PAF (paroxysmal atrial fibrillation) ([facility] Patient states she is still struggling with fatigue.)         PROBLEM LIST:    Patient Active Problem List    Diagnosis Date Noted    Acute CVA (cerebrovascular accident) ([facility] 06/03/2021     Priority: Low    GERD (gastroesophageal reflux disease)      Priority: Low    Non-rheumatic mitral regurgitation 11/16/2017     Priority: Low     Overview Note:     2014 mil/ mod      Pure hypercholesterolemia 11/16/2017     Priority: Low    IFG (impaired fasting glucose) 11/16/2017     Priority: Low    Acquired hypothyroidism 11/16/2017     Priority: Low    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     Priority: Low    B12 deficiency 11/16/2017     Priority: Low    Vitamin D deficiency 11/16/2017     Priority: Low    PAF (paroxysmal atrial fibrillation) ([facility] 09/07/2014     Priority: Low     Overview Note:     9/6/2014  DCCV in ED  09/07/2014  Echo  Normal LVFX  09/07/2014  lexiscan negative for myocardial ischemia, EF 62 %            Personal history of breast cancer 08/20/2014     Priority: Low    S/P lumpectomy, right breast 1/7/14 02/12/2014     Priority: Low    Breast cancer ([facility] 01/2014     Priority: Low     Overview Note:     right      Malignant neoplasm of other specified sites of female breast 12/09/2013     Priority: Low         PRESENTATION: Sara Gonzalez is a 76y.o. year old female who is seen today for routine cardiology follow-up appointment. She is known to have a history of atrial fibrillation status post direct-current cardioversion in September 2014. Unfortunately in 2021 she had a cerebral accident with persistent but improving left-sided numbness.   She has been on a combination of

## 2023-03-28 DIAGNOSIS — I48.0 PAF (PAROXYSMAL ATRIAL FIBRILLATION) (HCC): Primary | ICD-10-CM

## 2023-03-28 DIAGNOSIS — I20.8 ANGINA OF EFFORT (HCC): ICD-10-CM

## 2023-03-29 ENCOUNTER — TELEPHONE (OUTPATIENT)
Dept: NEUROLOGY | Age: 78
End: 2023-03-29

## 2023-03-29 NOTE — TELEPHONE ENCOUNTER
Called and left patient a VM to let her know that her appointment for 04-13-23 with Dr. Reyes Daily had to be rescheduled due to the provider is on call. Left on VM of when I have her appointment rescheduled too.

## 2023-05-02 RX ORDER — LEVOTHYROXINE SODIUM 0.07 MG/1
75 TABLET ORAL DAILY
Qty: 116 TABLET | Refills: 1 | Status: SHIPPED | OUTPATIENT
Start: 2023-05-02

## 2023-05-02 NOTE — TELEPHONE ENCOUNTER
Enzo Brady called requesting a refill of the below medication which has been pended for you:     Requested Prescriptions     Pending Prescriptions Disp Refills    levothyroxine (SYNTHROID) 75 MCG tablet [Pharmacy Med Name: Levothyroxine Sodium 75 MCG Oral Tablet] 116 tablet 1     Sig: Take 1 tablet by mouth Daily Take 1 tablet by mouth once daily except sundays take 2 tabs       Last Appointment Date: 1/30/2023  Next Appointment Date: 7/28/2023    No Known Allergies

## 2023-06-05 ENCOUNTER — HOSPITAL ENCOUNTER (OUTPATIENT)
Dept: NUCLEAR MEDICINE | Age: 78
Discharge: HOME | End: 2023-06-07
Payer: MEDICARE

## 2023-06-05 DIAGNOSIS — I20.8 ANGINA OF EFFORT (HCC): ICD-10-CM

## 2023-06-05 LAB
LV EF: 74 %
LVEF MODALITY: NORMAL

## 2023-06-05 PROCEDURE — 6360000002 HC RX W HCPCS: Performed by: INTERNAL MEDICINE

## 2023-06-05 PROCEDURE — 93016 CV STRESS TEST SUPVJ ONLY: CPT | Performed by: INTERNAL MEDICINE

## 2023-06-05 PROCEDURE — 3430000000 HC RX DIAGNOSTIC RADIOPHARMACEUTICAL: Performed by: INTERNAL MEDICINE

## 2023-06-05 PROCEDURE — 93017 CV STRESS TEST TRACING ONLY: CPT

## 2023-06-05 PROCEDURE — A9502 TC99M TETROFOSMIN: HCPCS | Performed by: INTERNAL MEDICINE

## 2023-06-05 PROCEDURE — 93018 CV STRESS TEST I&R ONLY: CPT | Performed by: INTERNAL MEDICINE

## 2023-06-05 PROCEDURE — 78452 HT MUSCLE IMAGE SPECT MULT: CPT | Performed by: INTERNAL MEDICINE

## 2023-06-05 RX ORDER — REGADENOSON 0.08 MG/ML
0.4 INJECTION, SOLUTION INTRAVENOUS
Status: COMPLETED | OUTPATIENT
Start: 2023-06-05 | End: 2023-06-05

## 2023-06-05 RX ADMIN — REGADENOSON 0.4 MG: 0.08 INJECTION, SOLUTION INTRAVENOUS at 12:03

## 2023-06-05 RX ADMIN — TETROFOSMIN 8 MILLICURIE: 1.38 INJECTION, POWDER, LYOPHILIZED, FOR SOLUTION INTRAVENOUS at 10:50

## 2023-06-05 RX ADMIN — TETROFOSMIN 24 MILLICURIE: 1.38 INJECTION, POWDER, LYOPHILIZED, FOR SOLUTION INTRAVENOUS at 12:15

## 2023-06-07 NOTE — RESULT ENCOUNTER NOTE
Stress test results are reassuring with no evidence for blood supply/demand mismatch. Excellent heart squeeze function with ejection fraction 74%. Continue current treatment plan.

## 2023-07-17 RX ORDER — FLECAINIDE ACETATE 150 MG/1
150 TABLET ORAL 2 TIMES DAILY
Qty: 180 TABLET | Refills: 1 | Status: SHIPPED | OUTPATIENT
Start: 2023-07-17 | End: 2023-10-15

## 2023-07-28 ENCOUNTER — HOSPITAL ENCOUNTER (OUTPATIENT)
Dept: GENERAL RADIOLOGY | Age: 78
Discharge: HOME | End: 2023-07-28
Payer: MEDICARE

## 2023-07-28 ENCOUNTER — OFFICE VISIT (OUTPATIENT)
Dept: INTERNAL MEDICINE | Age: 78
End: 2023-07-28
Payer: MEDICARE

## 2023-07-28 VITALS
RESPIRATION RATE: 18 BRPM | OXYGEN SATURATION: 95 % | HEART RATE: 76 BPM | WEIGHT: 206 LBS | BODY MASS INDEX: 34.32 KG/M2 | SYSTOLIC BLOOD PRESSURE: 118 MMHG | DIASTOLIC BLOOD PRESSURE: 78 MMHG | HEIGHT: 65 IN

## 2023-07-28 DIAGNOSIS — E66.09 EXOGENOUS OBESITY: ICD-10-CM

## 2023-07-28 DIAGNOSIS — I48.0 PAF (PAROXYSMAL ATRIAL FIBRILLATION) (HCC): ICD-10-CM

## 2023-07-28 DIAGNOSIS — R73.01 IFG (IMPAIRED FASTING GLUCOSE): ICD-10-CM

## 2023-07-28 DIAGNOSIS — M79.622 PAIN IN BOTH UPPER ARMS: ICD-10-CM

## 2023-07-28 DIAGNOSIS — K52.9 CHRONIC DIARRHEA: ICD-10-CM

## 2023-07-28 DIAGNOSIS — E55.9 VITAMIN D DEFICIENCY: ICD-10-CM

## 2023-07-28 DIAGNOSIS — E03.9 ACQUIRED HYPOTHYROIDISM: ICD-10-CM

## 2023-07-28 DIAGNOSIS — I67.9 CEREBROVASCULAR DISEASE: ICD-10-CM

## 2023-07-28 DIAGNOSIS — R06.02 SHORTNESS OF BREATH: ICD-10-CM

## 2023-07-28 DIAGNOSIS — Z00.00 MEDICARE ANNUAL WELLNESS VISIT, SUBSEQUENT: ICD-10-CM

## 2023-07-28 DIAGNOSIS — Z86.73 STATUS POST CVA: ICD-10-CM

## 2023-07-28 DIAGNOSIS — M79.621 PAIN IN BOTH UPPER ARMS: ICD-10-CM

## 2023-07-28 DIAGNOSIS — E78.00 PURE HYPERCHOLESTEROLEMIA: ICD-10-CM

## 2023-07-28 DIAGNOSIS — E78.00 PURE HYPERCHOLESTEROLEMIA: Primary | ICD-10-CM

## 2023-07-28 DIAGNOSIS — Z78.0 MENOPAUSE: ICD-10-CM

## 2023-07-28 DIAGNOSIS — Z12.31 ENCOUNTER FOR SCREENING MAMMOGRAM FOR BREAST CANCER: ICD-10-CM

## 2023-07-28 DIAGNOSIS — E53.8 B12 DEFICIENCY: ICD-10-CM

## 2023-07-28 LAB
25(OH)D3 SERPL-MCNC: 51.8 NG/ML
ALBUMIN SERPL-MCNC: 4.3 G/DL (ref 3.5–5.2)
ALP SERPL-CCNC: 78 U/L (ref 35–104)
ALT SERPL-CCNC: 13 U/L (ref 5–33)
ANION GAP SERPL CALCULATED.3IONS-SCNC: 13 MMOL/L (ref 7–19)
AST SERPL-CCNC: 17 U/L (ref 5–32)
BILIRUB SERPL-MCNC: 0.7 MG/DL (ref 0.2–1.2)
BUN SERPL-MCNC: 13 MG/DL (ref 8–23)
CALCIUM SERPL-MCNC: 9.6 MG/DL (ref 8.8–10.2)
CHLORIDE SERPL-SCNC: 105 MMOL/L (ref 98–111)
CHOLEST SERPL-MCNC: 266 MG/DL (ref 160–199)
CO2 SERPL-SCNC: 25 MMOL/L (ref 22–29)
CREAT SERPL-MCNC: 0.8 MG/DL (ref 0.5–0.9)
GLUCOSE SERPL-MCNC: 96 MG/DL (ref 74–109)
HBA1C MFR BLD: 5.2 % (ref 4–6)
HDLC SERPL-MCNC: 64 MG/DL (ref 65–121)
LDLC SERPL CALC-MCNC: 170 MG/DL
POTASSIUM SERPL-SCNC: 4.5 MMOL/L (ref 3.5–5)
PROT SERPL-MCNC: 7.3 G/DL (ref 6.6–8.7)
SODIUM SERPL-SCNC: 143 MMOL/L (ref 136–145)
T4 FREE SERPL-MCNC: 1.61 NG/DL (ref 0.93–1.7)
TRIGL SERPL-MCNC: 162 MG/DL (ref 0–149)
TSH SERPL DL<=0.005 MIU/L-ACNC: 3.3 UIU/ML (ref 0.27–4.2)

## 2023-07-28 PROCEDURE — 1123F ACP DISCUSS/DSCN MKR DOCD: CPT | Performed by: INTERNAL MEDICINE

## 2023-07-28 PROCEDURE — 99214 OFFICE O/P EST MOD 30 MIN: CPT | Performed by: INTERNAL MEDICINE

## 2023-07-28 PROCEDURE — 71046 X-RAY EXAM CHEST 2 VIEWS: CPT

## 2023-07-28 ASSESSMENT — ENCOUNTER SYMPTOMS
COUGH: 0
SORE THROAT: 0
WHEEZING: 0
CONSTIPATION: 0
CHEST TIGHTNESS: 0
ABDOMINAL PAIN: 0

## 2023-07-28 NOTE — PROGRESS NOTES
counseling about diet and exercise including education on what calories are, where calories come from, the need for portion control,following lower carbohydrate dietary regimen and healthy snacks along side an active lifestyle with supplementary exercise of approx 30 minutes a week, 5 days a week of exercise for weight loss. The patient voiced increased understanding of the topics discussed. Chronic diarrhrea ongoing for over a year  RX Loperamide 2 mg   RX sent in     Pain upper arms  Suggest tylenol hs  If sx not improving and resolving , if sx continue or re-occur pt has been instructed to call us and / or return here for follow- up evaluation    Dizziness  Some unstable gait since stroke- she feels it is related to her cardiac medication  Vestibular exercises given to pt      SOB  Pt has PAF  Physically inactive  Thinks is related to medicines  Suggest CXR todat  Also she will dw cardiology         Orders Placed This Encounter   Procedures    XR CHEST (2 VW)    CBC with Auto Differential    Comprehensive Metabolic Panel    Hemoglobin A1C    Lipid Panel    TSH    Urinalysis    Vitamin D 25 Hydroxy    Vitamin B12     New Prescriptions    No medications on file         Return in about 6 months (around 1/28/2024) for Annual Physical.   There are no Patient Instructions on file for this visit. EMR Dragon/transcription disclaimer:Significant part of this  encounter note is electronic transcription/translationof spoken language to printed text. The electronic translation of spoken language may be erroneous, or at times, nonsensical words or phrases may be inadvertently transcribed.  Although I have reviewed the note for sucherrors, some may still exist.

## 2023-08-08 RX ORDER — LOPERAMIDE HYDROCHLORIDE 2 MG/1
CAPSULE ORAL
Qty: 90 CAPSULE | Refills: 0 | Status: SHIPPED | OUTPATIENT
Start: 2023-08-08

## 2023-08-08 NOTE — TELEPHONE ENCOUNTER
Armando Walden called requesting a refill of the below medication which has been pended for you:     Requested Prescriptions     Pending Prescriptions Disp Refills    loperamide (IMODIUM) 2 MG capsule [Pharmacy Med Name: Loperamide HCl 2 MG Oral Capsule] 90 capsule 0     Sig: TAKE 1 CAPSULE BY MOUTH ONCE DAILY AS NEEDED FOR DIARRHEA       Last Appointment Date: 7/28/2023  Next Appointment Date: 1/30/2024    No Known Allergies

## 2023-09-08 RX ORDER — METOPROLOL SUCCINATE 25 MG/1
TABLET, EXTENDED RELEASE ORAL
Qty: 45 TABLET | Refills: 0 | Status: SHIPPED | OUTPATIENT
Start: 2023-09-08

## 2023-10-02 ENCOUNTER — OFFICE VISIT (OUTPATIENT)
Dept: CARDIOLOGY CLINIC | Age: 78
End: 2023-10-02
Payer: MEDICARE

## 2023-10-02 VITALS
BODY MASS INDEX: 34.32 KG/M2 | SYSTOLIC BLOOD PRESSURE: 134 MMHG | HEART RATE: 68 BPM | HEIGHT: 65 IN | WEIGHT: 206 LBS | DIASTOLIC BLOOD PRESSURE: 88 MMHG

## 2023-10-02 DIAGNOSIS — I48.0 PAF (PAROXYSMAL ATRIAL FIBRILLATION) (HCC): Primary | ICD-10-CM

## 2023-10-02 PROCEDURE — 1123F ACP DISCUSS/DSCN MKR DOCD: CPT | Performed by: INTERNAL MEDICINE

## 2023-10-02 PROCEDURE — 99214 OFFICE O/P EST MOD 30 MIN: CPT | Performed by: INTERNAL MEDICINE

## 2023-10-02 PROCEDURE — 93000 ELECTROCARDIOGRAM COMPLETE: CPT | Performed by: INTERNAL MEDICINE

## 2023-10-02 NOTE — PROGRESS NOTES
10/2/2023 at 2:28 PM    Terrell Lizama MD, LIVIA, [facility]  Noninvasive Cardiology Consultant    This dictation was generated by voice recognition computer software. Although all attempts are made to edit the dictation for accuracy, there may be errors in the transcription that are not intended.

## 2023-10-30 RX ORDER — LOPERAMIDE HYDROCHLORIDE 2 MG/1
CAPSULE ORAL
Qty: 90 CAPSULE | Refills: 0 | Status: SHIPPED | OUTPATIENT
Start: 2023-10-30

## 2023-10-30 NOTE — TELEPHONE ENCOUNTER
Yaakov Bernie called to request a refill on her medication.       Last office visit : 7/28/2023   Next office visit : 1/30/2024     Requested Prescriptions     Pending Prescriptions Disp Refills    loperamide (IMODIUM) 2 MG capsule [Pharmacy Med Name: Loperamide HCl 2 MG Oral Capsule] 90 capsule 0     Sig: TAKE 1 CAPSULE BY MOUTH ONCE DAILY AS NEEDED FOR DIARRHEA            Theresa Carson [place]

## 2023-11-20 RX ORDER — APIXABAN 5 MG/1
TABLET, FILM COATED ORAL
Qty: 180 TABLET | Refills: 0 | Status: SHIPPED | OUTPATIENT
Start: 2023-11-20

## 2023-11-21 ENCOUNTER — OFFICE VISIT (OUTPATIENT)
Dept: CARDIOLOGY CLINIC | Age: 78
End: 2023-11-21
Payer: MEDICARE

## 2023-11-21 VITALS
DIASTOLIC BLOOD PRESSURE: 68 MMHG | HEART RATE: 77 BPM | HEIGHT: 65 IN | OXYGEN SATURATION: 96 % | BODY MASS INDEX: 34.32 KG/M2 | SYSTOLIC BLOOD PRESSURE: 120 MMHG | WEIGHT: 206 LBS

## 2023-11-21 DIAGNOSIS — I48.0 PAF (PAROXYSMAL ATRIAL FIBRILLATION) (HCC): Primary | ICD-10-CM

## 2023-11-21 DIAGNOSIS — R53.83 OTHER FATIGUE: ICD-10-CM

## 2023-11-21 PROCEDURE — 99213 OFFICE O/P EST LOW 20 MIN: CPT | Performed by: CLINICAL NURSE SPECIALIST

## 2023-11-21 PROCEDURE — 1123F ACP DISCUSS/DSCN MKR DOCD: CPT | Performed by: CLINICAL NURSE SPECIALIST

## 2023-11-21 NOTE — PATIENT INSTRUCTIONS
Maintain good blood pressure control-goal<130/80 at rest  Maintain good cholesterol control LDL goal<70 with arterial disease  If you are diabetic work to keep/obtain hemoglobin A1c< 7    Follow up in 6 mos With Dr. Carloina Chen   Call with any questions or concerns  Follow up with Duane Levins, MD for non cardiac problems  Report any new problems  Cardiovascular Fitness-Exercise as tolerated. Strive for 30 minutes of exercise most days of the week. Cardiac / Healthy Diet- Avoid processed high fat foods, maintain low sodium/salt   Continue current medications as directed  Continue plan of treatment  It is always recommended that you bring your medications bottles with you to each visit - this is for your safety!

## 2023-11-21 NOTE — PROGRESS NOTES
[facility] Cardiology  [address] [facility], [address] [place] [address]  Phone: [phone]  Fax: [phone]    OFFICE VISIT:  11/21/2023     47-7: 1945    Reason For Visit:  Steffen Romero is a 78 y.o. female who is here for Follow-up (Pt has soa with fatigue) and Atrial Fibrillation  History of PAF and chronic fatigue. Been maintained on flecainide, metoprolol and Eliquis. Her metoprolol was decreased due to fatigue  Stress test showed no obvious infarct or ischemia  Echo stable. Saw Dr. Hali Montenegro last month and discontinued flecainide as source of chronic fatigue. Continued low-dose beta-blocker and Eliquis. Returns today in follow-up    Ports that she thinks her fatigue is some better. She still has CINTRON at times but that is stable. She is been checking her blood pressure at home. Occasionally elevated  Heart rate has been regular. She has not noticed any arrhythmia      Subjective  Steffen Romero denies exertional chest pain, shortness of breath, orthopnea, paroxysmal nocturnal dyspnea, syncope, presyncope, arrhythmia, edema and fatigue. The patient denies numbness or weakness to suggest cerebrovascular accident or transient ischemic attack. Rajwinder Lau MD is PCP and follows labs.   Luis Grimm has the following history as recorded in [facility]:    Patient Active Problem List    Diagnosis Date Noted    Acute CVA (cerebrovascular accident) ([address]) 06/03/2021    GERD (gastroesophageal reflux disease)     Non-rheumatic mitral regurgitation 11/16/2017    Pure hypercholesterolemia 11/16/2017    IFG (impaired fasting glucose) 11/16/2017    Acquired hypothyroidism 11/16/2017    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    B12 deficiency 11/16/2017    Vitamin D deficiency 11/16/2017    PAF (paroxysmal atrial fibrillation) ([address]) 09/07/2014    Personal history of breast cancer 08/20/2014    S/P lumpectomy, right breast 1/7/14 02/12/2014    Breast cancer ([address]) 01/2014    Malignant neoplasm of other specified sites

## 2023-11-26 NOTE — TELEPHONE ENCOUNTER
Lul Mervin called to request a refill on her medication.       Last office visit : 7/28/2023   Next office visit : 1/30/2024     Requested Prescriptions     Pending Prescriptions Disp Refills    levothyroxine (SYNTHROID) 75 MCG tablet [Pharmacy Med Name: Levothyroxine Sodium 75 MCG Oral Tablet] 116 tablet 0     Sig: TAKE 1 TABLET BY MOUTH ONCE DAILY EXCEPT  SUNDAYS  TAKE  TWO  TABLETS            Silver Devine MA

## 2023-11-27 ENCOUNTER — APPOINTMENT (OUTPATIENT)
Dept: GENERAL RADIOLOGY | Age: 78
DRG: 309 | End: 2023-11-27
Payer: MEDICARE

## 2023-11-27 ENCOUNTER — HOSPITAL ENCOUNTER (INPATIENT)
Age: 78
LOS: 1 days | Discharge: HOME | DRG: 309 | End: 2023-11-28
Attending: EMERGENCY MEDICINE | Admitting: STUDENT IN AN ORGANIZED HEALTH CARE EDUCATION/TRAINING PROGRAM
Payer: MEDICARE

## 2023-11-27 DIAGNOSIS — I48.91 ATRIAL FIBRILLATION WITH RVR (HCC): Primary | ICD-10-CM

## 2023-11-27 DIAGNOSIS — E87.5 HYPERKALEMIA: ICD-10-CM

## 2023-11-27 LAB
ALBUMIN SERPL-MCNC: 4.1 G/DL (ref 3.5–5.2)
ALP SERPL-CCNC: 75 U/L (ref 35–104)
ALT SERPL-CCNC: 8 U/L (ref 5–33)
ANION GAP SERPL CALCULATED.3IONS-SCNC: 12 MMOL/L (ref 7–19)
AST SERPL-CCNC: 28 U/L (ref 5–32)
BASOPHILS # BLD: 0 K/UL (ref 0–0.2)
BASOPHILS NFR BLD: 0.5 % (ref 0–1)
BILIRUB SERPL-MCNC: 0.6 MG/DL (ref 0.2–1.2)
BNP BLD-MCNC: 1004 PG/ML (ref 0–449)
BUN SERPL-MCNC: 17 MG/DL (ref 8–23)
CALCIUM SERPL-MCNC: 9.4 MG/DL (ref 8.8–10.2)
CHLORIDE SERPL-SCNC: 106 MMOL/L (ref 98–111)
CO2 SERPL-SCNC: 23 MMOL/L (ref 22–29)
CREAT SERPL-MCNC: 1 MG/DL (ref 0.5–0.9)
EOSINOPHIL # BLD: 0 K/UL (ref 0–0.6)
EOSINOPHIL NFR BLD: 0.3 % (ref 0–5)
ERYTHROCYTE [DISTWIDTH] IN BLOOD BY AUTOMATED COUNT: 12.7 % (ref 11.5–14.5)
GLUCOSE SERPL-MCNC: 233 MG/DL (ref 74–109)
HCT VFR BLD AUTO: 45.2 % (ref 37–47)
HGB BLD-MCNC: 15.1 G/DL (ref 12–16)
IMM GRANULOCYTES # BLD: 0 K/UL
LYMPHOCYTES # BLD: 1.8 K/UL (ref 1.1–4.5)
LYMPHOCYTES NFR BLD: 19.8 % (ref 20–40)
MAGNESIUM SERPL-MCNC: 2.1 MG/DL (ref 1.6–2.4)
MCH RBC QN AUTO: 33 PG (ref 27–31)
MCHC RBC AUTO-ENTMCNC: 33.4 G/DL (ref 33–37)
MCV RBC AUTO: 98.9 FL (ref 81–99)
MONOCYTES # BLD: 0.3 K/UL (ref 0–0.9)
MONOCYTES NFR BLD: 3.5 % (ref 0–10)
NEUTROPHILS # BLD: 6.7 K/UL (ref 1.5–7.5)
NEUTS SEG NFR BLD: 75.6 % (ref 50–65)
PLATELET # BLD AUTO: 304 K/UL (ref 130–400)
PMV BLD AUTO: 11.9 FL (ref 9.4–12.3)
POTASSIUM SERPL-SCNC: 4 MMOL/L (ref 3.5–5)
POTASSIUM SERPL-SCNC: 5.6 MMOL/L (ref 3.5–5)
PROT SERPL-MCNC: 6.8 G/DL (ref 6.6–8.7)
RBC # BLD AUTO: 4.57 M/UL (ref 4.2–5.4)
REASON FOR REJECTION: NORMAL
REJECTED TEST: NORMAL
SARS-COV-2 RDRP RESP QL NAA+PROBE: NOT DETECTED
SODIUM SERPL-SCNC: 141 MMOL/L (ref 136–145)
TROPONIN, HIGH SENSITIVITY: 54 NG/L (ref 0–14)
WBC # BLD AUTO: 8.8 K/UL (ref 4.8–10.8)

## 2023-11-27 PROCEDURE — 84484 ASSAY OF TROPONIN QUANT: CPT

## 2023-11-27 PROCEDURE — 99285 EMERGENCY DEPT VISIT HI MDM: CPT

## 2023-11-27 PROCEDURE — 84132 ASSAY OF SERUM POTASSIUM: CPT

## 2023-11-27 PROCEDURE — 83735 ASSAY OF MAGNESIUM: CPT

## 2023-11-27 PROCEDURE — 96376 TX/PRO/DX INJ SAME DRUG ADON: CPT

## 2023-11-27 PROCEDURE — 2140000000 HC CCU INTERMEDIATE R&B

## 2023-11-27 PROCEDURE — 87635 SARS-COV-2 COVID-19 AMP PRB: CPT

## 2023-11-27 PROCEDURE — 85025 COMPLETE CBC W/AUTO DIFF WBC: CPT

## 2023-11-27 PROCEDURE — 36415 COLL VENOUS BLD VENIPUNCTURE: CPT

## 2023-11-27 PROCEDURE — 96366 THER/PROPH/DIAG IV INF ADDON: CPT

## 2023-11-27 PROCEDURE — 2580000003 HC RX 258: Performed by: EMERGENCY MEDICINE

## 2023-11-27 PROCEDURE — 80053 COMPREHEN METABOLIC PANEL: CPT

## 2023-11-27 PROCEDURE — 2500000003 HC RX 250 WO HCPCS: Performed by: EMERGENCY MEDICINE

## 2023-11-27 PROCEDURE — 96365 THER/PROPH/DIAG IV INF INIT: CPT

## 2023-11-27 PROCEDURE — 83880 ASSAY OF NATRIURETIC PEPTIDE: CPT

## 2023-11-27 PROCEDURE — 71045 X-RAY EXAM CHEST 1 VIEW: CPT

## 2023-11-27 RX ORDER — DILTIAZEM HYDROCHLORIDE 5 MG/ML
10 INJECTION INTRAVENOUS ONCE
Status: COMPLETED | OUTPATIENT
Start: 2023-11-27 | End: 2023-11-27

## 2023-11-27 RX ORDER — 0.9 % SODIUM CHLORIDE 0.9 %
1000 INTRAVENOUS SOLUTION INTRAVENOUS ONCE
Status: COMPLETED | OUTPATIENT
Start: 2023-11-27 | End: 2023-11-27

## 2023-11-27 RX ORDER — DEXTROSE MONOHYDRATE 100 MG/ML
INJECTION, SOLUTION INTRAVENOUS CONTINUOUS PRN
Status: DISCONTINUED | OUTPATIENT
Start: 2023-11-27 | End: 2023-11-28 | Stop reason: HOSPADM

## 2023-11-27 RX ORDER — LEVOTHYROXINE SODIUM 0.07 MG/1
TABLET ORAL
Qty: 116 TABLET | Refills: 0 | Status: SHIPPED | OUTPATIENT
Start: 2023-11-27

## 2023-11-27 RX ADMIN — DILTIAZEM HYDROCHLORIDE 10 MG: 5 INJECTION, SOLUTION INTRAVENOUS at 12:35

## 2023-11-27 RX ADMIN — DILTIAZEM HYDROCHLORIDE 10 MG/HR: 5 INJECTION INTRAVENOUS at 13:06

## 2023-11-27 RX ADMIN — SODIUM CHLORIDE 1000 ML: 9 INJECTION, SOLUTION INTRAVENOUS at 12:34

## 2023-11-27 RX ADMIN — SODIUM CHLORIDE 1000 ML: 9 INJECTION, SOLUTION INTRAVENOUS at 17:00

## 2023-11-27 ASSESSMENT — PAIN - FUNCTIONAL ASSESSMENT: PAIN_FUNCTIONAL_ASSESSMENT: NONE - DENIES PAIN

## 2023-11-27 ASSESSMENT — ENCOUNTER SYMPTOMS: SHORTNESS OF BREATH: 1

## 2023-11-27 NOTE — ED PROVIDER NOTES
[facility]  eMERGENCY dEPARTMENT eNCOUnter      Pt Name: Park England  MRN: [id number]  [address] [facility] 1945  Date of evaluation: 11/27/2023  Provider: Coral Duncan MD    CHIEF COMPLAINT       Chief Complaint   Patient presents with    Shortness of Breath    Palpitations     Started at [facility]   (Location/Symptom, Timing/Onset,Context/Setting, Quality, Duration, Modifying Factors, Severity)  Note limiting factors. Park England is a 78 y.o. female who presents to the emergency department palpitations and weakness. HPI    Patient is a 78-year-old white female with a history of paroxysmal atrial fibrillation on Eliquis formally on flecainide as well as metoprolol for rate control who presents with palpitations shortness of breath and weakness in the with going into atrial fibrillation with RVR earlier today. She says she has had some diarrhea over the past few days and missed her medications yesterday. She was recently taken off flecainide because she was having trouble tolerating that medication. No fever. No nausea. No vomiting. Presents with hypotension and a heart rate in the 180s to 190s. NursingNotes were reviewed. REVIEW OF SYSTEMS    (2-9 systems for level 4, 10 or more for level 5)     Review of Systems   Constitutional:  Negative for chills, diaphoresis, fatigue and fever. HENT:  Negative for rhinorrhea, sinus pressure and sore throat. Eyes:  Negative for visual disturbance. Respiratory:  Negative for shortness of breath. Cardiovascular:  Positive for palpitations. Negative for chest pain. Gastrointestinal:  Negative for abdominal pain, diarrhea, nausea and vomiting. Genitourinary:  Negative for difficulty urinating and dysuria. Musculoskeletal:  Negative for arthralgias and myalgias. Skin:  Negative for rash. Neurological:  Negative for weakness. Psychiatric/Behavioral:  Negative for confusion.     All other systems

## 2023-11-27 NOTE — CARE COORDINATION
Case Management Assessment  Initial Evaluation    Date/Time of Evaluation: 11/27/2023 2:36 PM  Assessment Completed by: Mila Basilio    If patient is discharged prior to next notation, then this note serves as note for discharge by case management. Patient Name: Luann Colon                   YOB: 1945  Diagnosis: No admission diagnoses are documented for this encounter. Date / Time: 11/27/2023 12:18 PM    Patient Admission Status: Emergency   Readmission Risk (Low < 19, Mod (19-27), High > 27): No data recorded  Current PCP: Helga Ferro MD  PCP verified by CM? Yes    Chart Reviewed: Yes      History Provided by: Patient  Patient Orientation: Alert and Oriented    Patient Cognition: Alert    Hospitalization in the last 30 days (Readmission):  No    If yes, Readmission Assessment in  Navigator will be completed. Advance Directives:      Code Status: Prior   Patient's Primary Decision Maker is: Legal Next of Kin    Primary Decision MakerRpatti Bejarano Spouse - [phone]    Discharge Planning:    Patient lives with: Spouse/Significant Other Type of Home: House  Primary Care Giver: Self  Patient Support Systems include: Spouse/Significant Other, Family Members, Friends/Neighbors   Current Financial resources: Medicare  Current community resources: None  Current services prior to admission: None            Current DME:              Type of Home Care services:  None    ADLS  Prior functional level: Independent in ADLs/IADLs  Current functional level: Independent in ADLs/IADLs    PT AM-PAC:   /24  OT AM-PAC:   /24    Family can provide assistance at DC: Yes  Would you like Case Management to discuss the discharge plan with any other family members/significant others, and if so, who?  Yes (Spouse)  Plans to Return to Present Housing: Yes  Potential Assistance needed at discharge: N/A            Potential DME:    Patient expects to discharge to: [address] [place] [address]

## 2023-11-27 NOTE — H&P
printed text.  The electronic translation of spoken language may permit erroneous, or at times, nonsensical words or phrases to be inadvertently transcribed; although attempts have made to review the note for such errors, some may still exist.

## 2023-11-28 VITALS
DIASTOLIC BLOOD PRESSURE: 85 MMHG | HEIGHT: 65 IN | OXYGEN SATURATION: 93 % | SYSTOLIC BLOOD PRESSURE: 144 MMHG | HEART RATE: 65 BPM | TEMPERATURE: 97.3 F | RESPIRATION RATE: 14 BRPM | WEIGHT: 206.19 LBS | BODY MASS INDEX: 34.35 KG/M2

## 2023-11-28 LAB
25(OH)D3 SERPL-MCNC: 35.3 NG/ML
ANION GAP SERPL CALCULATED.3IONS-SCNC: 9 MMOL/L (ref 7–19)
BASOPHILS # BLD: 0.1 K/UL (ref 0–0.2)
BASOPHILS NFR BLD: 0.6 % (ref 0–1)
BUN SERPL-MCNC: 16 MG/DL (ref 8–23)
CALCIUM SERPL-MCNC: 8.5 MG/DL (ref 8.8–10.2)
CHLORIDE SERPL-SCNC: 112 MMOL/L (ref 98–111)
CO2 SERPL-SCNC: 21 MMOL/L (ref 22–29)
CREAT SERPL-MCNC: 0.5 MG/DL (ref 0.5–0.9)
D DIMER PPP FEU-MCNC: 0.39 UG/ML FEU (ref 0–0.48)
EKG P AXIS: 37 DEGREES
EKG P-R INTERVAL: 162 MS
EKG Q-T INTERVAL: 408 MS
EKG QRS DURATION: 88 MS
EKG QTC CALCULATION (BAZETT): 429 MS
EKG T AXIS: 42 DEGREES
EOSINOPHIL # BLD: 0.1 K/UL (ref 0–0.6)
EOSINOPHIL NFR BLD: 1.5 % (ref 0–5)
ERYTHROCYTE [DISTWIDTH] IN BLOOD BY AUTOMATED COUNT: 13 % (ref 11.5–14.5)
FOLATE SERPL-MCNC: 4.3 NG/ML (ref 4.8–37.3)
GLUCOSE BLD-MCNC: 100 MG/DL (ref 70–99)
GLUCOSE SERPL-MCNC: 96 MG/DL (ref 74–109)
HBA1C MFR BLD: 5.3 % (ref 4–6)
HCT VFR BLD AUTO: 36.3 % (ref 37–47)
HGB BLD-MCNC: 12 G/DL (ref 12–16)
IMM GRANULOCYTES # BLD: 0 K/UL
LYMPHOCYTES # BLD: 2.1 K/UL (ref 1.1–4.5)
LYMPHOCYTES NFR BLD: 25.8 % (ref 20–40)
MAGNESIUM SERPL-MCNC: 1.9 MG/DL (ref 1.6–2.4)
MCH RBC QN AUTO: 33.4 PG (ref 27–31)
MCHC RBC AUTO-ENTMCNC: 33.1 G/DL (ref 33–37)
MCV RBC AUTO: 101.1 FL (ref 81–99)
MONOCYTES # BLD: 0.6 K/UL (ref 0–0.9)
MONOCYTES NFR BLD: 7.2 % (ref 0–10)
NEUTROPHILS # BLD: 5.3 K/UL (ref 1.5–7.5)
NEUTS SEG NFR BLD: 64.5 % (ref 50–65)
PERFORMED ON: ABNORMAL
PLATELET # BLD AUTO: 220 K/UL (ref 130–400)
PMV BLD AUTO: 11.5 FL (ref 9.4–12.3)
POTASSIUM SERPL-SCNC: 3.8 MMOL/L (ref 3.5–5)
RBC # BLD AUTO: 3.59 M/UL (ref 4.2–5.4)
SODIUM SERPL-SCNC: 142 MMOL/L (ref 136–145)
TROPONIN, HIGH SENSITIVITY: 143 NG/L (ref 0–14)
TROPONIN, HIGH SENSITIVITY: 171 NG/L (ref 0–14)
TROPONIN, HIGH SENSITIVITY: 189 NG/L (ref 0–14)
TSH SERPL DL<=0.005 MIU/L-ACNC: 0.9 UIU/ML (ref 0.27–4.2)
VIT B12 SERPL-MCNC: 1262 PG/ML (ref 211–946)
WBC # BLD AUTO: 8.2 K/UL (ref 4.8–10.8)

## 2023-11-28 PROCEDURE — 6370000000 HC RX 637 (ALT 250 FOR IP): Performed by: INTERNAL MEDICINE

## 2023-11-28 PROCEDURE — 82962 GLUCOSE BLOOD TEST: CPT

## 2023-11-28 PROCEDURE — 83735 ASSAY OF MAGNESIUM: CPT

## 2023-11-28 PROCEDURE — 84443 ASSAY THYROID STIM HORMONE: CPT

## 2023-11-28 PROCEDURE — 80048 BASIC METABOLIC PNL TOTAL CA: CPT

## 2023-11-28 PROCEDURE — 85379 FIBRIN DEGRADATION QUANT: CPT

## 2023-11-28 PROCEDURE — 83036 HEMOGLOBIN GLYCOSYLATED A1C: CPT

## 2023-11-28 PROCEDURE — 6360000004 HC RX CONTRAST MEDICATION: Performed by: STUDENT IN AN ORGANIZED HEALTH CARE EDUCATION/TRAINING PROGRAM

## 2023-11-28 PROCEDURE — 36415 COLL VENOUS BLD VENIPUNCTURE: CPT

## 2023-11-28 PROCEDURE — 6370000000 HC RX 637 (ALT 250 FOR IP)

## 2023-11-28 PROCEDURE — 85025 COMPLETE CBC W/AUTO DIFF WBC: CPT

## 2023-11-28 PROCEDURE — 82746 ASSAY OF FOLIC ACID SERUM: CPT

## 2023-11-28 PROCEDURE — 2580000003 HC RX 258

## 2023-11-28 PROCEDURE — 93005 ELECTROCARDIOGRAM TRACING: CPT | Performed by: EMERGENCY MEDICINE

## 2023-11-28 PROCEDURE — C8929 TTE W OR WO FOL WCON,DOPPLER: HCPCS

## 2023-11-28 PROCEDURE — 84484 ASSAY OF TROPONIN QUANT: CPT

## 2023-11-28 PROCEDURE — 82306 VITAMIN D 25 HYDROXY: CPT

## 2023-11-28 PROCEDURE — 99223 1ST HOSP IP/OBS HIGH 75: CPT | Performed by: INTERNAL MEDICINE

## 2023-11-28 PROCEDURE — 82607 VITAMIN B-12: CPT

## 2023-11-28 PROCEDURE — 6370000000 HC RX 637 (ALT 250 FOR IP): Performed by: STUDENT IN AN ORGANIZED HEALTH CARE EDUCATION/TRAINING PROGRAM

## 2023-11-28 RX ORDER — VITAMIN B COMPLEX
4000 TABLET ORAL DAILY
Status: DISCONTINUED | OUTPATIENT
Start: 2023-11-28 | End: 2023-11-28 | Stop reason: HOSPADM

## 2023-11-28 RX ORDER — INSULIN LISPRO 100 [IU]/ML
0-4 INJECTION, SOLUTION INTRAVENOUS; SUBCUTANEOUS NIGHTLY
Status: DISCONTINUED | OUTPATIENT
Start: 2023-11-28 | End: 2023-11-28 | Stop reason: HOSPADM

## 2023-11-28 RX ORDER — ONDANSETRON 2 MG/ML
4 INJECTION INTRAMUSCULAR; INTRAVENOUS EVERY 6 HOURS PRN
Status: DISCONTINUED | OUTPATIENT
Start: 2023-11-28 | End: 2023-11-28 | Stop reason: HOSPADM

## 2023-11-28 RX ORDER — SODIUM CHLORIDE 9 MG/ML
INJECTION, SOLUTION INTRAVENOUS PRN
Status: DISCONTINUED | OUTPATIENT
Start: 2023-11-28 | End: 2023-11-28 | Stop reason: HOSPADM

## 2023-11-28 RX ORDER — SODIUM CHLORIDE 0.9 % (FLUSH) 0.9 %
5-40 SYRINGE (ML) INJECTION PRN
Status: DISCONTINUED | OUTPATIENT
Start: 2023-11-28 | End: 2023-11-28 | Stop reason: HOSPADM

## 2023-11-28 RX ORDER — ACETAMINOPHEN 325 MG/1
650 TABLET ORAL EVERY 6 HOURS PRN
Status: DISCONTINUED | OUTPATIENT
Start: 2023-11-28 | End: 2023-11-28 | Stop reason: HOSPADM

## 2023-11-28 RX ORDER — POLYETHYLENE GLYCOL 3350 17 G/17G
17 POWDER, FOR SOLUTION ORAL DAILY PRN
Status: DISCONTINUED | OUTPATIENT
Start: 2023-11-28 | End: 2023-11-28 | Stop reason: HOSPADM

## 2023-11-28 RX ORDER — ACETAMINOPHEN 650 MG/1
650 SUPPOSITORY RECTAL EVERY 6 HOURS PRN
Status: DISCONTINUED | OUTPATIENT
Start: 2023-11-28 | End: 2023-11-28 | Stop reason: HOSPADM

## 2023-11-28 RX ORDER — DEXTROSE MONOHYDRATE 100 MG/ML
INJECTION, SOLUTION INTRAVENOUS CONTINUOUS PRN
Status: DISCONTINUED | OUTPATIENT
Start: 2023-11-28 | End: 2023-11-28 | Stop reason: HOSPADM

## 2023-11-28 RX ORDER — LOPERAMIDE HYDROCHLORIDE 2 MG/1
2 CAPSULE ORAL 4 TIMES DAILY PRN
Status: DISCONTINUED | OUTPATIENT
Start: 2023-11-28 | End: 2023-11-28 | Stop reason: HOSPADM

## 2023-11-28 RX ORDER — FLECAINIDE ACETATE 50 MG/1
100 TABLET ORAL 3 TIMES DAILY
Status: DISCONTINUED | OUTPATIENT
Start: 2023-11-28 | End: 2023-11-28 | Stop reason: HOSPADM

## 2023-11-28 RX ORDER — INSULIN LISPRO 100 [IU]/ML
0-4 INJECTION, SOLUTION INTRAVENOUS; SUBCUTANEOUS
Status: DISCONTINUED | OUTPATIENT
Start: 2023-11-28 | End: 2023-11-28 | Stop reason: HOSPADM

## 2023-11-28 RX ORDER — ONDANSETRON 4 MG/1
4 TABLET, ORALLY DISINTEGRATING ORAL EVERY 8 HOURS PRN
Status: DISCONTINUED | OUTPATIENT
Start: 2023-11-28 | End: 2023-11-28 | Stop reason: HOSPADM

## 2023-11-28 RX ORDER — UBIDECARENONE 100 MG
100 CAPSULE ORAL DAILY
Status: DISCONTINUED | OUTPATIENT
Start: 2023-11-28 | End: 2023-11-28 | Stop reason: HOSPADM

## 2023-11-28 RX ORDER — ASPIRIN 81 MG/1
81 TABLET, CHEWABLE ORAL DAILY
Status: DISCONTINUED | OUTPATIENT
Start: 2023-11-28 | End: 2023-11-28 | Stop reason: HOSPADM

## 2023-11-28 RX ORDER — FLECAINIDE ACETATE 100 MG/1
100 TABLET ORAL 3 TIMES DAILY
Qty: 90 TABLET | Refills: 0 | Status: SHIPPED | OUTPATIENT
Start: 2023-11-28

## 2023-11-28 RX ORDER — LATANOPROST 50 UG/ML
1 SOLUTION/ DROPS OPHTHALMIC NIGHTLY
Status: DISCONTINUED | OUTPATIENT
Start: 2023-11-28 | End: 2023-11-28 | Stop reason: HOSPADM

## 2023-11-28 RX ORDER — METOPROLOL SUCCINATE 25 MG/1
25 TABLET, EXTENDED RELEASE ORAL DAILY
Status: DISCONTINUED | OUTPATIENT
Start: 2023-11-28 | End: 2023-11-28 | Stop reason: HOSPADM

## 2023-11-28 RX ORDER — SODIUM CHLORIDE 0.9 % (FLUSH) 0.9 %
5-40 SYRINGE (ML) INJECTION EVERY 12 HOURS SCHEDULED
Status: DISCONTINUED | OUTPATIENT
Start: 2023-11-28 | End: 2023-11-28 | Stop reason: HOSPADM

## 2023-11-28 RX ORDER — LEVOTHYROXINE SODIUM 0.07 MG/1
75 TABLET ORAL DAILY
Status: DISCONTINUED | OUTPATIENT
Start: 2023-11-28 | End: 2023-11-28 | Stop reason: HOSPADM

## 2023-11-28 RX ORDER — UBIDECARENONE 75 MG
50 CAPSULE ORAL DAILY
Status: DISCONTINUED | OUTPATIENT
Start: 2023-11-28 | End: 2023-11-28 | Stop reason: HOSPADM

## 2023-11-28 RX ADMIN — Medication 4000 UNITS: at 09:28

## 2023-11-28 RX ADMIN — SODIUM CHLORIDE, PRESERVATIVE FREE 10 ML: 5 INJECTION INTRAVENOUS at 09:29

## 2023-11-28 RX ADMIN — LEVOTHYROXINE SODIUM 75 MCG: 75 TABLET ORAL at 09:29

## 2023-11-28 RX ADMIN — ASPIRIN 81 MG: 81 TABLET, CHEWABLE ORAL at 09:28

## 2023-11-28 RX ADMIN — PERFLUTREN 1.5 ML: 6.52 INJECTION, SUSPENSION INTRAVENOUS at 08:48

## 2023-11-28 RX ADMIN — METOPROLOL SUCCINATE 25 MG: 25 TABLET, EXTENDED RELEASE ORAL at 09:29

## 2023-11-28 RX ADMIN — VITAM B12 50 MCG: 100 TAB at 09:29

## 2023-11-28 RX ADMIN — LOPERAMIDE HYDROCHLORIDE 2 MG: 2 CAPSULE ORAL at 11:25

## 2023-11-28 RX ADMIN — Medication 100 MG: at 09:29

## 2023-11-28 RX ADMIN — FLECAINIDE ACETATE 100 MG: 50 TABLET ORAL at 14:21

## 2023-11-28 RX ADMIN — APIXABAN 5 MG: 5 TABLET, FILM COATED ORAL at 09:29

## 2023-11-28 ASSESSMENT — ENCOUNTER SYMPTOMS
NAUSEA: 0
RHINORRHEA: 0
SINUS PRESSURE: 0
SHORTNESS OF BREATH: 0
SORE THROAT: 0
VOMITING: 0
DIARRHEA: 0
ABDOMINAL PAIN: 0

## 2023-11-28 NOTE — PLAN OF CARE
Problem: Discharge Planning  Goal: Discharge to home or other facility with appropriate resources  11/28/2023 1545 by Christian Betancourt RN  Outcome: Completed  11/28/2023 1032 by Christian Beatncourt RN  Outcome: Progressing  Flowsheets (Taken 11/28/2023 4464)  Discharge to home or other facility with appropriate resources: Identify barriers to discharge with patient and caregiver     Problem: ABCDS Injury Assessment  Goal: Absence of physical injury  11/28/2023 1545 by Christian Betancourt RN  Outcome: Completed  11/28/2023 1032 by Christian Betancourt RN  Outcome: Progressing     Problem: Safety - Adult  Goal: Free from fall injury  11/28/2023 1545 by Christian Betancourt RN  Outcome: Completed  11/28/2023 1032 by Christian Betancourt RN  Outcome: Progressing     Problem: Chronic Conditions and Co-morbidities  Goal: Patient's chronic conditions and co-morbidity symptoms are monitored and maintained or improved  11/28/2023 1545 by Christian Betancourt RN  Outcome: Completed  11/28/2023 1032 by Christian Betancourt RN  Outcome: Progressing Assessment  DMT2: 86y Female with DM T2 with hyperglycemia, A1C 9.4% , was on insulin at home, now on insulin, increased dose yesterday, blood sugars still elevated and not at target, no hypoglycemic episodes. Patient eats partial meals, resting comfortably.  HF: on medications, no chest pain, stable, monitored.  A.Fib: On meds, stable, monitored.  HTN: Controlled,  on antihypertensive medications.  CKD: Monitor labs/BMP          Brandon Mayer MD  Cell: [phone]  Office: [phone] Assessment  DMT2: 86y Female with DM T2 with hyperglycemia, A1C 9.4% , was on insulin at home, now on insulin, increased dose yesterday, blood sugars still elevated and not at target, no hypoglycemic episodes.  Patient eats partial meals, resting comfortably.  HF: on medications, no chest pain, stable, monitored.  A.Fib: On meds, stable, monitored.  HTN: Controlled,  on antihypertensive medications.  CKD: Monitor labs/BMP          Brandon Mayer MD  Cell: [phone]  Office: [phone]

## 2023-11-28 NOTE — PROGRESS NOTES
Physician Progress Note      Alexus Sanchez  CSN #:                  [id number]  :                       1945  ADMIT DATE:       2023 12:18 PM  [address] [facility] DATE:  RESPONDING  PROVIDER #:        Angela Hester MD          QUERY TEXT:    Patient admitted with shortness of breath, noted to have atrial fibrillation   and is maintained on Eliquis. If possible, please document in progress notes   and discharge summary if you are evaluating and/or treating any of the   following: The medical record reflects the following:  Risk Factors: Age, Female  Clinical Indicators: Afib, Age, Female,  Treatment: EP consult, Cardizem, labs, Eliqus 5 mg po b.i.d. Thank you  Diamond Cardona RN, BSN, [facility]  [phone]  Options provided:  -- Secondary hypercoagulable state in a patient with atrial fibrillation  -- Other - I will add my own diagnosis  -- Disagree - Not applicable / Not valid  -- Disagree - Clinically unable to determine / Unknown  -- Refer to Clinical Documentation Reviewer    PROVIDER RESPONSE TEXT:    This patient has secondary hypercoagulable state in a patient with atrial   fibrillation.     Query created by: Diamond Cardona on 2023 1:51 PM      Electronically signed by:  Angela Hester MD 2023 2:46 PM

## 2023-11-28 NOTE — PLAN OF CARE
Problem: Discharge Planning  Goal: Discharge to home or other facility with appropriate resources  Outcome: Progressing  Flowsheets (Taken 11/28/2023 0958)  Discharge to home or other facility with appropriate resources: Identify barriers to discharge with patient and caregiver     Problem: ABCDS Injury Assessment  Goal: Absence of physical injury  Outcome: Progressing     Problem: Safety - Adult  Goal: Free from fall injury  Outcome: Progressing     Problem: Chronic Conditions and Co-morbidities  Goal: Patient's chronic conditions and co-morbidity symptoms are monitored and maintained or improved  Outcome: Progressing

## 2023-11-28 NOTE — DISCHARGE SUMMARY
Discharge Summary    NAME: Sheri Cherry  :  1945  MRN:  [id number]    Admit date:  2023  Discharge date:    2023    Admitting Physician:  Stacey Jensen MD    Advance Directive: Full Code    Consults: cardiology    Primary Care Physician:  Reynaldo Goff MD    Discharge Diagnoses:  Principal Problem:    Atrial fibrillation with rapid ventricular response ([address])  Active Problems:    Acquired hypothyroidism    Hyperkalemia  Resolved Problems:    * No resolved hospital problems. *      Significant Diagnostic Studies:   XR CHEST PORTABLE    Result Date: 2023  EXAM: CHEST RADIOGRAPH  TECHNIQUE: Single frontal chest radiograph. HISTORY: Shortness of breath. COMPARISON: 2023  FINDINGS:  The the patient is mildly leaning to the left and rotated to the right. Hypoventilation. EKG leads and oxygen tubing project over the chest. Interval development of mild ground-glass of both mid and lower lungs. Mild atelectasis of the left base. Trace left pleural effusion. No visible pneumothorax. Interval development of cardiomegaly and pulmonary venous congestion. No acute displaced rib fractures are identified. Surgical clips in the right axilla. 1.  Limited hypoventilated portable study of the chest. 2.  Interval development of cardiomegaly and mild pulmonary edema with equivocal small left pleural effusion. ______________________________________ Electronically signed by: Melissa Delgadillo M.D. Date:     2023 Time:    14:57       Pertinent Labs:   CBC:   Recent Labs     23  1222 23  0500   WBC 8.8 8.2   HGB 15.1 12.0    220     BMP:    Recent Labs     23  1222 23  2223 23  0500     --  142   K 5.6* 4.0 3.8     --  112*   CO2 23  --  21*   BUN 17  --  16   CREATININE 1.0*  --  0.5   GLUCOSE 233*  --  96     INR: No results for input(s): \"INR\" in the last 72 hours.   Lipids: No results for input(s): \"CHOL\", \"HDL\" in the last 72

## 2023-11-28 NOTE — CONSULTS
Labs     11/27/23  1222 11/28/23  0500   WBC 8.8 8.2   RBC 4.57 3.59*   HGB 15.1 12.0   HCT 45.2 36.3*   MCV 98.9 101.1*   MCH 33.0* 33.4*   MCHC 33.4 33.1   RDW 12.7 13.0    220   MPV 11.9 11.5         Recent Labs     11/27/23  1222 11/27/23  2223 11/28/23  0500     --  142   K 5.6* 4.0 3.8     --  112*   CO2 23  --  21*   BUN 17  --  16   CREATININE 1.0*  --  0.5   GLUCOSE 233*  --  96   CALCIUM 9.4  --  8.5*   PROT 6.8  --   --    LABALBU 4.1  --   --    BILITOT 0.6  --   --    ALKPHOS 75  --   --    AST 28  --   --    ALT 8  --   --       Other Electrolytes:  FLP:    Lab Results   Component Value Date/Time    TRIG 162 07/28/2023 07:24 AM    HDL 64 07/28/2023 07:24 AM    LDLCALC 170 07/28/2023 07:24 AM    LDLDIRECT 82 09/06/2014 09:25 AM     TSH:    Lab Results   Component Value Date/Time    TSH 0.897 11/28/2023 05:00 AM     Troponin T: No results for input(s): \"TROPONINI\" in the last 72 hours. INR: No results for input(s): \"INR\" in the last 72 hours. Telemetry:  (I reviewed) sinus rhythm  EKG: (I reviewed) no ST changes    Assessment/Plan:    #1 paroxysmal atrial fibrillation-we discussed options including changing both of her drugs to sotalol but she does not want to stay in the hospital for the load. She had a negative nuclear study back in June and a normal echo this morning so I think the reasonable thing is to go back on flecainide as it turns out it was not contributing to her fatigue in the first place. Her dizziness which she also attributed to the antiarrhythmic sounds more like orthostasis. She has a positive troponin here but no EKG changes and a negative stress test this year. She may be discharged home safely this afternoon. I will place her on 100 mg of flecainide 3 times a day to see if she tolerates this better. She is on apixaban for anticoagulation.      Clifford Kearns MD, 11/28/2023 1:50 PM

## 2023-11-29 ENCOUNTER — TELEPHONE (OUTPATIENT)
Dept: PRIMARY CARE CLINIC | Age: 78
End: 2023-11-29

## 2023-11-29 NOTE — TELEPHONE ENCOUNTER
Care Transitions Initial Follow Up Call    Outreach made within 2 business days of discharge: Yes    Patient: Marco Antonio Chilel   Patient : 1945   MRN: [id number]    Reason for Admission: Atrial fibrillation with rapid ventricular response   Discharge Date: 23       Spoke with: Hector Petersen     Discharge department/facility: [facility]    TCM Interactive Patient Contact:  Was patient able to fill all prescriptions: Yes  Was patient instructed to bring all medications to the follow-up visit: Yes  Is patient taking all medications as directed in the discharge summary? Yes  Does patient understand their discharge instructions: Yes  Does patient have questions or concerns that need addressed prior to 7-14 day follow up office visit: no    Scheduled appointment with PCP within 7-14 days. Spoke with Hector Petersen. She states she is feeling very good. [facility]. She was able to  her medications. She has not had any further rapid heart rate or SOB. Her appetite is good. She denies any fever, chills or signs of infection. She will see Dr. Sheryl Mathur .      Follow Up  Future Appointments   Date Time Provider [address] [place] [address]   2023  9:00 AM [facility]   2023 11:30 AM JAILYN Villafana [facility]   2024 10:45 AM Taj Napoles MD [facility]   2024  2:30 PM MD ALFRED Helm [facility]       Ian Bowman, [facility]

## 2023-12-04 ENCOUNTER — HOSPITAL ENCOUNTER (OUTPATIENT)
Dept: GENERAL RADIOLOGY | Age: 78
Discharge: HOME | End: 2023-12-04
Payer: MEDICARE

## 2023-12-04 ENCOUNTER — OFFICE VISIT (OUTPATIENT)
Dept: INTERNAL MEDICINE | Age: 78
End: 2023-12-04
Payer: MEDICARE

## 2023-12-04 VITALS
WEIGHT: 205.8 LBS | SYSTOLIC BLOOD PRESSURE: 118 MMHG | BODY MASS INDEX: 34.29 KG/M2 | DIASTOLIC BLOOD PRESSURE: 68 MMHG | OXYGEN SATURATION: 95 % | HEART RATE: 72 BPM | HEIGHT: 65 IN

## 2023-12-04 DIAGNOSIS — Z86.73 H/O: CVA (CEREBROVASCULAR ACCIDENT): ICD-10-CM

## 2023-12-04 DIAGNOSIS — I48.0 PAF (PAROXYSMAL ATRIAL FIBRILLATION) (HCC): ICD-10-CM

## 2023-12-04 DIAGNOSIS — I34.0 MODERATE MITRAL REGURGITATION: ICD-10-CM

## 2023-12-04 DIAGNOSIS — I67.9 CEREBROVASCULAR DISEASE: ICD-10-CM

## 2023-12-04 DIAGNOSIS — R93.89 ABNORMAL CHEST X-RAY: ICD-10-CM

## 2023-12-04 DIAGNOSIS — I48.91 ATRIAL FIBRILLATION WITH RAPID VENTRICULAR RESPONSE (HCC): Primary | ICD-10-CM

## 2023-12-04 DIAGNOSIS — E53.8 FOLIC ACID DEFICIENCY: ICD-10-CM

## 2023-12-04 DIAGNOSIS — E53.8 B12 DEFICIENCY: ICD-10-CM

## 2023-12-04 DIAGNOSIS — E78.00 PURE HYPERCHOLESTEROLEMIA: ICD-10-CM

## 2023-12-04 PROCEDURE — 71046 X-RAY EXAM CHEST 2 VIEWS: CPT

## 2023-12-04 PROCEDURE — 1123F ACP DISCUSS/DSCN MKR DOCD: CPT | Performed by: INTERNAL MEDICINE

## 2023-12-04 RX ORDER — ROSUVASTATIN CALCIUM 10 MG/1
10 TABLET, COATED ORAL DAILY
Qty: 30 TABLET | Refills: 5 | Status: SHIPPED | OUTPATIENT
Start: 2023-12-04

## 2023-12-04 ASSESSMENT — ENCOUNTER SYMPTOMS
CHEST TIGHTNESS: 0
CONSTIPATION: 0
SORE THROAT: 0
COUGH: 0
ABDOMINAL PAIN: 0
WHEEZING: 0

## 2023-12-04 NOTE — PROGRESS NOTES
11/28/2023 05:00 AM     11/27/2023 12:22 PM     01/30/2023 09:20 AM     Lab Results   Component Value Date/Time    CHOL 266 07/28/2023 07:24 AM    TRIG 162 07/28/2023 07:24 AM    HDL 64 07/28/2023 07:24 AM    LDLDIRECT 82 09/06/2014 09:25 AM       Assessment/Plan:  Atrial fibrillation with rapid ventricular response (HCC)  Rx flecanide increase to 100 tid  Metoprolol er 25 daily  Eliquis daily    Abnormal chest x-ray  1. Limited hypoventilated portable study of the chest.  2.  Interval development of cardiomegaly and mild pulmonary edema with equivocal small left pleural effusion. -     XR CHEST (2 VW); Future    H/O: CVA (cerebrovascular accident)  Remains on ASA  Stopepd crestor- now willing to restart  Rx sent  Folic acid deficiency  Start folic acid 803 daily + cont b12 1 mg daily    Moderate mitral regurgitation  Echo reviewed  As per carrdio        Diagnostic test results reviewed  This is high complexity TCM visit  Patient was called within 2 business days of discharge    Family able to provide care to patient at home  No other needs including PT, OT needs detected at this time  No additional provider follow-ups needed at this time        Patient risk of morbidity and mortality: low  Orders Placed This Encounter   Procedures    XR CHEST (2 VW)     Standing Status:   Future     Standing Expiration Date:   12/3/2024       No follow-ups on file.

## 2023-12-27 ENCOUNTER — OFFICE VISIT (OUTPATIENT)
Dept: CARDIOLOGY CLINIC | Age: 78
End: 2023-12-27
Payer: MEDICARE

## 2023-12-27 VITALS
HEIGHT: 65 IN | HEART RATE: 66 BPM | BODY MASS INDEX: 34.16 KG/M2 | WEIGHT: 205 LBS | DIASTOLIC BLOOD PRESSURE: 70 MMHG | SYSTOLIC BLOOD PRESSURE: 124 MMHG | OXYGEN SATURATION: 97 %

## 2023-12-27 DIAGNOSIS — I48.0 PAF (PAROXYSMAL ATRIAL FIBRILLATION) (HCC): Primary | ICD-10-CM

## 2023-12-27 PROCEDURE — 99213 OFFICE O/P EST LOW 20 MIN: CPT | Performed by: CLINICAL NURSE SPECIALIST

## 2023-12-27 PROCEDURE — 1123F ACP DISCUSS/DSCN MKR DOCD: CPT | Performed by: CLINICAL NURSE SPECIALIST

## 2023-12-27 PROCEDURE — 93000 ELECTROCARDIOGRAM COMPLETE: CPT | Performed by: CLINICAL NURSE SPECIALIST

## 2023-12-27 RX ORDER — ROSUVASTATIN CALCIUM 10 MG/1
10 TABLET, COATED ORAL DAILY
Qty: 30 TABLET | Refills: 5 | Status: CANCELLED | OUTPATIENT
Start: 2023-12-27

## 2023-12-27 NOTE — PATIENT INSTRUCTIONS
Maintain good blood pressure control-goal<130/80 at rest  Maintain good cholesterol control LDL goal<70 with arterial disease  If you are diabetic work to keep/obtain hemoglobin A1c< 7    Follow up in May With Dr. Cale Monroe   Call with any questions or concerns  Follow up with Neli Rosales MD for non cardiac problems  Report any new problems  Cardiovascular Fitness-Exercise as tolerated. Strive for 30 minutes of exercise most days of the week. Cardiac / Healthy Diet- Avoid processed high fat foods, maintain low sodium/salt   Continue current medications as directed  Continue plan of treatment  It is always recommended that you bring your medications bottles with you to each visit - this is for your safety!

## 2023-12-27 NOTE — PROGRESS NOTES
[facility]  [address] [facility], [address]  Phone: [phone]  Fax: [phone]    OFFICE VISIT:  12/27/2023     47-7: 1945    Reason For Visit:  Armando Walden is a 78 y.o. female who is here for Follow-Up from Hospital (No Cardiac Sx ) and Atrial Fibrillation  History of PAF and chronic fatigue. Been maintained on flecainide, metoprolol and Eliquis. Her metoprolol was decreased due to fatigue  Stress test showed no obvious infarct or ischemia  Echo stable. Saw Dr. Kimberly Del Castillo in October and discontinued flecainide as source of chronic fatigue. Continued low-dose beta-blocker and Eliquis. Returns today in follow-up    She was seen in follow-up in November feeling better. CINTRON stable  With no recurrent arrhythmia  On 11/27/2023 patient presented to the emergency room with palpitations found to have A-fib with RVR. Converted with IV diltiazem. She was put back on flecainide and Toprol. She returns today in follow-up. She has not noticed any recurrent arrhythmia since being back on the flecainide. She states she still has some fatigue but she just had 30 people at her house for [place]. Larry Pressley denies exertional chest pain, shortness of breath, orthopnea, paroxysmal nocturnal dyspnea, syncope, presyncope, arrhythmia, edema and fatigue. The patient denies numbness or weakness to suggest cerebrovascular accident or transient ischemic attack. Megan England MD is PCP and follows labs.   Tank Angeles has the following history as recorded in [facility]:    Patient Active Problem List    Diagnosis Date Noted    Folic acid deficiency 73/89/7419    Hyperkalemia 11/27/2023    Atrial fibrillation with rapid ventricular response ([address]) 11/27/2023    Acute CVA (cerebrovascular accident) ([address]) 06/03/2021    GERD (gastroesophageal reflux disease)     Moderate mitral regurgitation 11/16/2017    Pure hypercholesterolemia 11/16/2017    IFG (impaired fasting

## 2024-01-11 ENCOUNTER — TELEPHONE (OUTPATIENT)
Dept: INTERNAL MEDICINE | Age: 79
End: 2024-01-11

## 2024-01-11 NOTE — TELEPHONE ENCOUNTER
I received a call today from [facility] stating the patient requested a genetic kit and they are waiting on a signature from our office. I called the patient to ask her about this because this seems to be a scam call that we get a lot. Pt states she did not request this. I got Lou from [facility] off of hold and advised her that this was never requested. She said she would send a denial letter for us to sign and fax back.

## 2024-01-12 NOTE — TELEPHONE ENCOUNTER
Pt called needing medication refilled   Chanceon called requesting a refill of the below medication which has been pended for you:     Requested Prescriptions     Pending Prescriptions Disp Refills    flecainide (TAMBOCOR) 100 MG tablet 90 tablet 2     Sig: Take 1 tablet by mouth in the morning, at noon, and at bedtime       Last Appointment Date: 12/27/2023  Next Appointment Date: 5/21/2024    No Known Allergies

## 2024-01-15 RX ORDER — FLECAINIDE ACETATE 100 MG/1
100 TABLET ORAL 3 TIMES DAILY
Qty: 90 TABLET | Refills: 2 | Status: SHIPPED | OUTPATIENT
Start: 2024-01-15 | End: 2024-01-16 | Stop reason: SDUPTHER

## 2024-01-16 RX ORDER — FLECAINIDE ACETATE 100 MG/1
100 TABLET ORAL 3 TIMES DAILY
Qty: 90 TABLET | Refills: 2 | Status: SHIPPED | OUTPATIENT
Start: 2024-01-16

## 2024-01-30 RX ORDER — LOPERAMIDE HYDROCHLORIDE 2 MG/1
CAPSULE ORAL
Qty: 90 CAPSULE | Refills: 0 | Status: SHIPPED | OUTPATIENT
Start: 2024-01-30

## 2024-01-30 NOTE — TELEPHONE ENCOUNTER
Asia Juárez called to request a refill on her medication.      Last office visit : 12/4/2023   Next office visit : 2/2/2024     Requested Prescriptions     Pending Prescriptions Disp Refills    loperamide (IMODIUM) 2 MG capsule [Pharmacy Med Name: Loperamide HCl 2 MG Oral Capsule] 90 capsule 0     Sig: TAKE 1 CAPSULE BY MOUTH ONCE DAILY AS NEEDED FOR DIARRHEA            Jessica Curry MA

## 2024-02-02 ENCOUNTER — OFFICE VISIT (OUTPATIENT)
Dept: INTERNAL MEDICINE | Age: 79
End: 2024-02-02

## 2024-02-02 VITALS
SYSTOLIC BLOOD PRESSURE: 112 MMHG | BODY MASS INDEX: 33.79 KG/M2 | WEIGHT: 202.8 LBS | OXYGEN SATURATION: 95 % | DIASTOLIC BLOOD PRESSURE: 68 MMHG | HEIGHT: 65 IN | HEART RATE: 69 BPM

## 2024-02-02 DIAGNOSIS — I48.0 PAF (PAROXYSMAL ATRIAL FIBRILLATION) (HCC): ICD-10-CM

## 2024-02-02 DIAGNOSIS — Z12.31 ENCOUNTER FOR SCREENING MAMMOGRAM FOR MALIGNANT NEOPLASM OF BREAST: ICD-10-CM

## 2024-02-02 DIAGNOSIS — Z00.00 MEDICARE ANNUAL WELLNESS VISIT, SUBSEQUENT: Primary | ICD-10-CM

## 2024-02-02 DIAGNOSIS — I67.9 CEREBROVASCULAR DISEASE: ICD-10-CM

## 2024-02-02 DIAGNOSIS — E53.8 B12 DEFICIENCY: ICD-10-CM

## 2024-02-02 DIAGNOSIS — E55.9 VITAMIN D DEFICIENCY: ICD-10-CM

## 2024-02-02 DIAGNOSIS — E66.09 EXOGENOUS OBESITY: ICD-10-CM

## 2024-02-02 DIAGNOSIS — R73.01 IFG (IMPAIRED FASTING GLUCOSE): ICD-10-CM

## 2024-02-02 DIAGNOSIS — Z86.73 STATUS POST CVA: ICD-10-CM

## 2024-02-02 DIAGNOSIS — E78.00 PURE HYPERCHOLESTEROLEMIA: ICD-10-CM

## 2024-02-02 DIAGNOSIS — E03.9 ACQUIRED HYPOTHYROIDISM: ICD-10-CM

## 2024-02-02 DIAGNOSIS — I34.0 MODERATE MITRAL REGURGITATION: ICD-10-CM

## 2024-02-02 LAB
25(OH)D3 SERPL-MCNC: 43 NG/ML
ALBUMIN SERPL-MCNC: 4.3 G/DL (ref 3.5–5.2)
ALP SERPL-CCNC: 82 U/L (ref 35–104)
ALT SERPL-CCNC: 17 U/L (ref 5–33)
ANION GAP SERPL CALCULATED.3IONS-SCNC: 11 MMOL/L (ref 7–19)
AST SERPL-CCNC: 22 U/L (ref 5–32)
BASOPHILS # BLD: 0 K/UL (ref 0–0.2)
BASOPHILS NFR BLD: 0.6 % (ref 0–1)
BILIRUB SERPL-MCNC: 0.7 MG/DL (ref 0.2–1.2)
BILIRUB UR STRIP.AUTO-MCNC: NEGATIVE MG/DL
BUN SERPL-MCNC: 15 MG/DL (ref 8–23)
CALCIUM SERPL-MCNC: 9.3 MG/DL (ref 8.8–10.2)
CHLORIDE SERPL-SCNC: 106 MMOL/L (ref 98–111)
CHOLEST SERPL-MCNC: 159 MG/DL (ref 160–199)
CLARITY UR: CLEAR
CO2 SERPL-SCNC: 27 MMOL/L (ref 22–29)
COLOR UR: YELLOW
CREAT SERPL-MCNC: 0.6 MG/DL (ref 0.5–0.9)
EOSINOPHIL # BLD: 0.2 K/UL (ref 0–0.6)
EOSINOPHIL NFR BLD: 2.9 % (ref 0–5)
ERYTHROCYTE [DISTWIDTH] IN BLOOD BY AUTOMATED COUNT: 12.8 % (ref 11.5–14.5)
GLUCOSE SERPL-MCNC: 94 MG/DL (ref 74–109)
GLUCOSE UR STRIP.AUTO-MCNC: NEGATIVE MG/DL
HBA1C MFR BLD: 5.3 % (ref 4–6)
HCT VFR BLD AUTO: 47 % (ref 37–47)
HDLC SERPL-MCNC: 71 MG/DL (ref 65–121)
HGB BLD-MCNC: 15 G/DL (ref 12–16)
HGB UR STRIP.AUTO-MCNC: NEGATIVE MG/L
IMM GRANULOCYTES # BLD: 0 K/UL
KETONES UR STRIP.AUTO-MCNC: NEGATIVE MG/DL
LDLC SERPL CALC-MCNC: 68 MG/DL
LEUKOCYTE ESTERASE UR QL STRIP.AUTO: NEGATIVE
LYMPHOCYTES # BLD: 1.6 K/UL (ref 1.1–4.5)
LYMPHOCYTES NFR BLD: 22.9 % (ref 20–40)
MCH RBC QN AUTO: 31.3 PG (ref 27–31)
MCHC RBC AUTO-ENTMCNC: 31.9 G/DL (ref 33–37)
MCV RBC AUTO: 98.1 FL (ref 81–99)
MONOCYTES # BLD: 0.5 K/UL (ref 0–0.9)
MONOCYTES NFR BLD: 6.7 % (ref 0–10)
NEUTROPHILS # BLD: 4.8 K/UL (ref 1.5–7.5)
NEUTS SEG NFR BLD: 66.8 % (ref 50–65)
NITRITE UR QL STRIP.AUTO: NEGATIVE
PH UR STRIP.AUTO: 6 [PH] (ref 5–8)
PLATELET # BLD AUTO: 264 K/UL (ref 130–400)
PMV BLD AUTO: 12.1 FL (ref 9.4–12.3)
POTASSIUM SERPL-SCNC: 4 MMOL/L (ref 3.5–5)
PROT SERPL-MCNC: 7.4 G/DL (ref 6.6–8.7)
PROT UR STRIP.AUTO-MCNC: NEGATIVE MG/DL
RBC # BLD AUTO: 4.79 M/UL (ref 4.2–5.4)
SODIUM SERPL-SCNC: 144 MMOL/L (ref 136–145)
SP GR UR STRIP.AUTO: >=1.03 (ref 1–1.03)
TRIGL SERPL-MCNC: 100 MG/DL (ref 0–149)
TSH SERPL DL<=0.005 MIU/L-ACNC: 2.28 UIU/ML (ref 0.27–4.2)
UROBILINOGEN UR STRIP.AUTO-MCNC: 1 E.U./DL
VIT B12 SERPL-MCNC: 657 PG/ML (ref 211–946)
WBC # BLD AUTO: 7.1 K/UL (ref 4.8–10.8)

## 2024-02-02 ASSESSMENT — PATIENT HEALTH QUESTIONNAIRE - PHQ9
SUM OF ALL RESPONSES TO PHQ QUESTIONS 1-9: 0
SUM OF ALL RESPONSES TO PHQ QUESTIONS 1-9: 0
2. FEELING DOWN, DEPRESSED OR HOPELESS: 0
SUM OF ALL RESPONSES TO PHQ QUESTIONS 1-9: 0
SUM OF ALL RESPONSES TO PHQ9 QUESTIONS 1 & 2: 0
SUM OF ALL RESPONSES TO PHQ QUESTIONS 1-9: 0
1. LITTLE INTEREST OR PLEASURE IN DOING THINGS: 0

## 2024-02-02 ASSESSMENT — ENCOUNTER SYMPTOMS
ABDOMINAL PAIN: 0
WHEEZING: 0
CONSTIPATION: 0
SORE THROAT: 0
COUGH: 0
CHEST TIGHTNESS: 0

## 2024-02-02 ASSESSMENT — LIFESTYLE VARIABLES
HOW MANY STANDARD DRINKS CONTAINING ALCOHOL DO YOU HAVE ON A TYPICAL DAY: PATIENT DOES NOT DRINK
HOW OFTEN DO YOU HAVE A DRINK CONTAINING ALCOHOL: NEVER

## 2024-02-02 NOTE — PROGRESS NOTES
(IMODIUM) 2 MG capsule TAKE 1 CAPSULE BY MOUTH ONCE DAILY AS NEEDED FOR DIARRHEA 90 capsule 0    flecainide (TAMBOCOR) 100 MG tablet Take 1 tablet by mouth in the morning, at noon, and at bedtime 90 tablet 2    metoprolol succinate (TOPROL XL) 25 MG extended release tablet Take 1/2 (one-half) tablet by mouth once daily 45 tablet 3    rosuvastatin (CRESTOR) 10 MG tablet Take 1 tablet by mouth daily 30 tablet 5    levothyroxine (SYNTHROID) 75 MCG tablet TAKE 1 TABLET BY MOUTH ONCE DAILY EXCEPT  SUNDAYS  TAKE  TWO  TABLETS 116 tablet 0    ELIQUIS 5 MG TABS tablet Take 1 tablet by mouth twice daily 180 tablet 0    Coenzyme Q10 (COQ10) 100 MG CAPS Take by mouth daily      vitamin B-12 (CYANOCOBALAMIN) 100 MCG tablet Take 0.5 tablets by mouth daily      latanoprost (XALATAN) 0.005 % ophthalmic solution Place 1 drop into both eyes nightly      Vitamin D (CHOLECALCIFEROL) 1000 UNITS CAPS capsule Take 4 capsules by mouth daily      aspirin 81 MG tablet Take 1 tablet by mouth daily       No current facility-administered medications for this visit.     No Known Allergies  Social History     Tobacco Use    Smoking status: Never    Smokeless tobacco: Never   Substance Use Topics    Alcohol use: No      Family History   Problem Relation Age of Onset    Cancer Mother         throat    Cancer Maternal Grandmother         ovarian    Ovarian Cancer Maternal Grandmother     Parkinsonism Father     Breast Cancer Neg Hx        Review of Systems   Constitutional:  Positive for fatigue. Negative for chills and fever.   HENT:  Negative for congestion, ear pain, nosebleeds, postnasal drip and sore throat.    Respiratory:  Negative for cough, chest tightness and wheezing.    Cardiovascular:  Negative for chest pain, palpitations and leg swelling.   Gastrointestinal:  Negative for abdominal pain and constipation.   Genitourinary:  Negative for dysuria and urgency.   Musculoskeletal:  Positive for arthralgias.   Skin:  Negative for rash. 
lifestyle, illnesses that may run in your family, and various assessments and screenings as appropriate.    After reviewing your medical record and screening and assessments performed today your provider may have ordered immunizations, labs, imaging, and/or referrals for you.  A list of these orders (if applicable) as well as your Preventive Care list are included within your After Visit Summary for your review.    Other Preventive Recommendations:    A preventive eye exam performed by an eye specialist is recommended every 1-2 years to screen for glaucoma; cataracts, macular degeneration, and other eye disorders.  A preventive dental visit is recommended every 6 months.  Try to get at least 150 minutes of exercise per week or 10,000 steps per day on a pedometer .  Order or download the FREE \"Exercise & Physical Activity: Your Everyday Guide\" from [employer]. Call 1-516.927.4767 or search [employer] online.  You need 2824-9127 mg of calcium and 2941-5670 IU of vitamin D per day. It is possible to meet your calcium requirement with diet alone, but a vitamin D supplement is usually necessary to meet this goal.  When exposed to the sun, use a sunscreen that protects against both UVA and UVB radiation with an SPF of 30 or greater. Reapply every 2 to 3 hours or after sweating, drying off with a towel, or swimming.  Always wear a seat belt when traveling in a car. Always wear a helmet when riding a bicycle or motorcycle.

## 2024-02-02 NOTE — PATIENT INSTRUCTIONS
enjoy your friend's company.  Know that good friends can be a bad influence. For example, if a friend encourages you to drink when you know it will harm you, you may want to end the friendship.  Where can you learn more?  Go to https://www.[employer].net/patientEd and enter G092 to learn more about \"Learning About Emotional Support.\"  Current as of: June 25, 2023               Content Version: 13.9  © 2006-2023 [employer].   Care instructions adapted under license by [employer]. If you have questions about a medical condition or this instruction, always ask your healthcare professional. [employer] disclaims any warranty or liability for your use of this information.           Learning About Being Active as an Older Adult  Why is being active important as you get older?     Being active is one of the best things you can do for your health. And it's never too late to start. Being active--or getting active, if you aren't already--has definite benefits. It can:  Give you more energy,  Keep your mind sharp.  Improve balance to reduce your risk of falls.  Help you manage chronic illness with fewer medicines.  No matter how old you are, how fit you are, or what health problems you have, there is a form of activity that will work for you. And the more physical activity you can do, the better your overall health will be.  What kinds of activity can help you stay healthy?  Being more active will make your daily activities easier. Physical activity includes planned exercise and things you do in daily life. There are four types of activity:  Aerobic.  Doing aerobic activity makes your heart and lungs strong.  Includes walking, dancing, and gardening.  Aim for at least 2½ hours spread throughout the week.  It improves your energy and can help you sleep better.  Muscle-strengthening.  This type of activity can help maintain muscle and strengthen bones.  Includes climbing stairs, using resistance

## 2024-02-27 RX ORDER — LEVOTHYROXINE SODIUM 0.07 MG/1
TABLET ORAL
Qty: 116 TABLET | Refills: 1 | Status: SHIPPED | OUTPATIENT
Start: 2024-02-27

## 2024-02-27 NOTE — TELEPHONE ENCOUNTER
Requested Prescriptions     Pending Prescriptions Disp Refills    levothyroxine (SYNTHROID) 75 MCG tablet 116 tablet 1     Sig: TAKE 1 TABLET BY MOUTH ONCE DAILY EXCEPT  SUNDAYS  TAKE  TWO  TABLETS

## 2024-03-22 ENCOUNTER — HOSPITAL ENCOUNTER (OUTPATIENT)
Dept: WOMENS IMAGING | Age: 79
Discharge: HOME | End: 2024-03-22
Attending: INTERNAL MEDICINE
Payer: MEDICARE

## 2024-03-22 DIAGNOSIS — Z12.31 ENCOUNTER FOR SCREENING MAMMOGRAM FOR MALIGNANT NEOPLASM OF BREAST: ICD-10-CM

## 2024-03-22 PROCEDURE — 77063 BREAST TOMOSYNTHESIS BI: CPT

## 2024-04-25 RX ORDER — LOPERAMIDE HYDROCHLORIDE 2 MG/1
CAPSULE ORAL
Qty: 90 CAPSULE | Refills: 0 | Status: SHIPPED | OUTPATIENT
Start: 2024-04-25

## 2024-05-28 ENCOUNTER — OFFICE VISIT (OUTPATIENT)
Dept: CARDIOLOGY CLINIC | Age: 79
End: 2024-05-28
Payer: MEDICARE

## 2024-05-28 VITALS
DIASTOLIC BLOOD PRESSURE: 84 MMHG | SYSTOLIC BLOOD PRESSURE: 136 MMHG | OXYGEN SATURATION: 98 % | HEART RATE: 67 BPM | WEIGHT: 202 LBS | HEIGHT: 65 IN | BODY MASS INDEX: 33.66 KG/M2

## 2024-05-28 DIAGNOSIS — I38 VALVULAR HEART DISEASE: ICD-10-CM

## 2024-05-28 DIAGNOSIS — I48.0 PAF (PAROXYSMAL ATRIAL FIBRILLATION) (HCC): Primary | ICD-10-CM

## 2024-05-28 DIAGNOSIS — E78.5 DYSLIPIDEMIA: ICD-10-CM

## 2024-05-28 PROCEDURE — 1123F ACP DISCUSS/DSCN MKR DOCD: CPT | Performed by: NURSE PRACTITIONER

## 2024-05-28 PROCEDURE — 99214 OFFICE O/P EST MOD 30 MIN: CPT | Performed by: NURSE PRACTITIONER

## 2024-05-28 ASSESSMENT — ENCOUNTER SYMPTOMS
CHEST TIGHTNESS: 0
SHORTNESS OF BREATH: 0
COUGH: 0
WHEEZING: 0
SORE THROAT: 0

## 2024-05-28 NOTE — PROGRESS NOTES
[facility] Cardiology   Established Patient Office Visit  [address]  [address]  [place] [address]  [phone]        OFFICE VISIT:  2024    Asia Juárez - : 1945    Reason For Visit:  Asia is a 78 y.o. female who is here for 6 Month Follow-Up    1. PAF (paroxysmal atrial fibrillation) (HCC)    2. Valvular heart disease    3. Dyslipidemia      Patient with a history of paroxysmal atrial fib, valvular heart disease and dyslipidemia.    Patient had seen Dr. Acevedo and 2023 with complaints of fatigue.  Her metoprolol was decreased and she discontinued the flecainide for her complaints of fatigue.  She was to continue low-dose beta-blocker and Eliquis.    In 2023 she presented to the emergency room with palpitations and was found to be in A-fib with RVR.  She converted with IV diltiazem.  She was put back on flecainide and Toprol.      2023 Lexiscan showed no evidence of myocardial infarction or ischemia     2D echo from 2021 showed normal LV size and function with EF 70%.  Grade 1 diastolic dysfunction.  No significant valvular disease noted.  Negative bubble study       Patient presents to clinic today for routine follow-up.  Patient denies any chest pain, pressure or tightness.  There is no shortness of breath, orthopnea or PND.  Patient denies any lightheadedness, dizziness or syncope.  Patient states she can tell if she goes into A-fib and she has not been feeling that way.  Her only complaint is chronic fatigue.  She is taking a nap throughout the day that has helped.      Subjective    Asia Juárez is a 78 y.o. female with the following history as recorded in [facility]:    Patient Active Problem List    Diagnosis Date Noted    Folic acid deficiency 2023    Hyperkalemia 2023    Atrial fibrillation with rapid ventricular response (HCC) 2023    Acute CVA (cerebrovascular accident) (HCC) 2021    GERD (gastroesophageal reflux disease)

## 2024-05-31 RX ORDER — APIXABAN 5 MG/1
TABLET, FILM COATED ORAL
Qty: 180 TABLET | Refills: 3 | Status: SHIPPED | OUTPATIENT
Start: 2024-05-31

## 2024-07-19 RX ORDER — FLECAINIDE ACETATE 100 MG/1
TABLET ORAL
Qty: 90 TABLET | Refills: 2 | Status: SHIPPED | OUTPATIENT
Start: 2024-07-19

## 2024-07-19 RX ORDER — ROSUVASTATIN CALCIUM 10 MG/1
10 TABLET, COATED ORAL DAILY
Qty: 30 TABLET | Refills: 2 | Status: SHIPPED | OUTPATIENT
Start: 2024-07-19

## 2024-07-19 NOTE — TELEPHONE ENCOUNTER
Asia Juárez called to request a refill on her medication.      Last office visit : 2/2/2024   Next office visit : 8/5/2024     Requested Prescriptions     Pending Prescriptions Disp Refills    rosuvastatin (CRESTOR) 10 MG tablet [Pharmacy Med Name: Rosuvastatin Calcium 10 MG Oral Tablet] 30 tablet 0     Sig: Take 1 tablet by mouth once daily    flecainide (TAMBOCOR) 100 MG tablet [Pharmacy Med Name: Flecainide Acetate 100 MG Oral Tablet] 90 tablet 0     Sig: TAKE 1 TABLET BY MOUTH IN THE MORNING AND 1 AT NOON AND 1 AT BEDTIME            Jessica Curry MA

## 2024-07-22 RX ORDER — LOPERAMIDE HYDROCHLORIDE 2 MG/1
CAPSULE ORAL
Qty: 90 CAPSULE | Refills: 0 | Status: SHIPPED | OUTPATIENT
Start: 2024-07-22

## 2024-08-05 ENCOUNTER — OFFICE VISIT (OUTPATIENT)
Dept: INTERNAL MEDICINE | Age: 79
End: 2024-08-05
Payer: MEDICARE

## 2024-08-05 VITALS
SYSTOLIC BLOOD PRESSURE: 112 MMHG | DIASTOLIC BLOOD PRESSURE: 66 MMHG | HEIGHT: 65 IN | HEART RATE: 70 BPM | WEIGHT: 203.8 LBS | OXYGEN SATURATION: 96 % | BODY MASS INDEX: 33.95 KG/M2

## 2024-08-05 DIAGNOSIS — E53.8 B12 DEFICIENCY: ICD-10-CM

## 2024-08-05 DIAGNOSIS — I34.0 MODERATE MITRAL REGURGITATION: ICD-10-CM

## 2024-08-05 DIAGNOSIS — Z00.00 MEDICARE ANNUAL WELLNESS VISIT, SUBSEQUENT: ICD-10-CM

## 2024-08-05 DIAGNOSIS — E03.9 ACQUIRED HYPOTHYROIDISM: Primary | ICD-10-CM

## 2024-08-05 DIAGNOSIS — D68.69 SECONDARY HYPERCOAGULABLE STATE (HCC): ICD-10-CM

## 2024-08-05 DIAGNOSIS — Z86.73 STATUS POST CVA: ICD-10-CM

## 2024-08-05 DIAGNOSIS — I67.9 CEREBROVASCULAR DISEASE: ICD-10-CM

## 2024-08-05 DIAGNOSIS — E78.00 PURE HYPERCHOLESTEROLEMIA: ICD-10-CM

## 2024-08-05 DIAGNOSIS — R73.01 IFG (IMPAIRED FASTING GLUCOSE): ICD-10-CM

## 2024-08-05 DIAGNOSIS — I48.0 PAF (PAROXYSMAL ATRIAL FIBRILLATION) (HCC): ICD-10-CM

## 2024-08-05 DIAGNOSIS — E55.9 VITAMIN D DEFICIENCY: ICD-10-CM

## 2024-08-05 DIAGNOSIS — E66.09 EXOGENOUS OBESITY: ICD-10-CM

## 2024-08-05 DIAGNOSIS — E03.9 ACQUIRED HYPOTHYROIDISM: ICD-10-CM

## 2024-08-05 DIAGNOSIS — Z12.31 ENCOUNTER FOR SCREENING MAMMOGRAM FOR MALIGNANT NEOPLASM OF BREAST: ICD-10-CM

## 2024-08-05 LAB
25(OH)D3 SERPL-MCNC: 39.6 NG/ML
ALBUMIN SERPL-MCNC: 4.1 G/DL (ref 3.5–5.2)
ALP SERPL-CCNC: 77 U/L (ref 35–104)
ALT SERPL-CCNC: 22 U/L (ref 5–33)
ANION GAP SERPL CALCULATED.3IONS-SCNC: 12 MMOL/L (ref 7–19)
AST SERPL-CCNC: 26 U/L (ref 5–32)
BILIRUB SERPL-MCNC: 0.7 MG/DL (ref 0.2–1.2)
BILIRUB UR QL STRIP: NEGATIVE
BUN SERPL-MCNC: 14 MG/DL (ref 8–23)
CALCIUM SERPL-MCNC: 8.9 MG/DL (ref 8.8–10.2)
CHLORIDE SERPL-SCNC: 108 MMOL/L (ref 98–111)
CHOLEST SERPL-MCNC: 159 MG/DL (ref 160–199)
CLARITY UR: ABNORMAL
CO2 SERPL-SCNC: 23 MMOL/L (ref 22–29)
COLOR UR: YELLOW
CREAT SERPL-MCNC: 0.6 MG/DL (ref 0.5–0.9)
CRYSTALS URNS MICRO: NORMAL /HPF
GLUCOSE SERPL-MCNC: 94 MG/DL (ref 74–109)
GLUCOSE UR STRIP.AUTO-MCNC: NEGATIVE MG/DL
HBA1C MFR BLD: 5.3 % (ref 4–6)
HDLC SERPL-MCNC: 66 MG/DL (ref 65–121)
HGB UR STRIP.AUTO-MCNC: ABNORMAL MG/L
KETONES UR STRIP.AUTO-MCNC: NEGATIVE MG/DL
LDLC SERPL CALC-MCNC: 70 MG/DL
LEUKOCYTE ESTERASE UR QL STRIP.AUTO: NEGATIVE
NITRITE UR QL STRIP.AUTO: NEGATIVE
PH UR STRIP.AUTO: 5.5 [PH] (ref 5–8)
POTASSIUM SERPL-SCNC: 4.2 MMOL/L (ref 3.5–5)
PROT SERPL-MCNC: 6.8 G/DL (ref 6.6–8.7)
PROT UR STRIP.AUTO-MCNC: NEGATIVE MG/DL
RBC #/AREA URNS HPF: NORMAL /HPF (ref 0–2)
SODIUM SERPL-SCNC: 143 MMOL/L (ref 136–145)
SP GR UR STRIP.AUTO: 1.02 (ref 1–1.03)
SQUAMOUS #/AREA URNS HPF: NORMAL /HPF
T4 FREE SERPL-MCNC: 1.38 NG/DL (ref 0.93–1.7)
TRIGL SERPL-MCNC: 117 MG/DL (ref 0–149)
TSH SERPL DL<=0.005 MIU/L-ACNC: 3.37 UIU/ML (ref 0.27–4.2)
UROBILINOGEN UR STRIP.AUTO-MCNC: 1 E.U./DL
WBC #/AREA URNS HPF: NORMAL /HPF (ref 0–5)

## 2024-08-05 PROCEDURE — 99214 OFFICE O/P EST MOD 30 MIN: CPT | Performed by: INTERNAL MEDICINE

## 2024-08-05 PROCEDURE — 1123F ACP DISCUSS/DSCN MKR DOCD: CPT | Performed by: INTERNAL MEDICINE

## 2024-08-05 SDOH — ECONOMIC STABILITY: FOOD INSECURITY: WITHIN THE PAST 12 MONTHS, YOU WORRIED THAT YOUR FOOD WOULD RUN OUT BEFORE YOU GOT MONEY TO BUY MORE.: NEVER TRUE

## 2024-08-05 SDOH — ECONOMIC STABILITY: HOUSING INSECURITY
IN THE LAST 12 MONTHS, WAS THERE A TIME WHEN YOU DID NOT HAVE A STEADY PLACE TO SLEEP OR SLEPT IN A SHELTER (INCLUDING NOW)?: NO

## 2024-08-05 SDOH — ECONOMIC STABILITY: INCOME INSECURITY: HOW HARD IS IT FOR YOU TO PAY FOR THE VERY BASICS LIKE FOOD, HOUSING, MEDICAL CARE, AND HEATING?: NOT HARD AT ALL

## 2024-08-05 SDOH — ECONOMIC STABILITY: FOOD INSECURITY: WITHIN THE PAST 12 MONTHS, THE FOOD YOU BOUGHT JUST DIDN'T LAST AND YOU DIDN'T HAVE MONEY TO GET MORE.: NEVER TRUE

## 2024-08-05 ASSESSMENT — ENCOUNTER SYMPTOMS
CONSTIPATION: 0
ABDOMINAL PAIN: 0
SORE THROAT: 0
WHEEZING: 0
CHEST TIGHTNESS: 0
COUGH: 0

## 2024-08-05 NOTE — PROGRESS NOTES
Chief Complaint   Patient presents with    6 Month Follow-Up     History of presenting illness:  Asia Juárez is a78 y.o. female who presents today for follow up on her chronic medical conditions as noted below.    Patient Active Problem List    Diagnosis Date Noted    Secondary hypercoagulable state (HCC) 08/05/2024    Folic acid deficiency 12/04/2023    Hyperkalemia 11/27/2023    Atrial fibrillation with rapid ventricular response (HCC) 11/27/2023    Acute CVA (cerebrovascular accident) ([facility]) 06/03/2021    GERD (gastroesophageal reflux disease)     Moderate mitral regurgitation 11/16/2017     Overview Note:     2014 mil/ mod      Pure hypercholesterolemia 11/16/2017    IFG (impaired fasting glucose) 11/16/2017    Acquired hypothyroidism 11/16/2017    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    B12 deficiency 11/16/2017    Vitamin D deficiency 11/16/2017    PAF (paroxysmal atrial fibrillation) ([facility]) 09/07/2014     Overview Note:     9/6/2014  DCCV in ED  09/07/2014  Echo  Normal LVFX  09/07/2014  lexiscan negative for myocardial ischemia, EF 62 %            Personal history of breast cancer 08/20/2014    S/P lumpectomy, right breast 1/7/14 02/12/2014     Past Medical History:   Diagnosis Date    Breast cancer (HCC) 1/2014    right    GERD (gastroesophageal reflux disease)     History of therapeutic radiation     Hyperlipidemia     Thyroid disease     Wears glasses       Past Surgical History:   Procedure Laterality Date    BREAST SURGERY Bilateral 1985    FIBROIDS REMOVED FROM EACH BREAST    BREAST SURGERY Right 01/07/2014    R partial mastectomy    BREAST SURGERY Right 01/22/2014    Rt JUAN R cath insertion    CARPAL TUNNEL RELEASE Left     EYE SURGERY      cataracts    HYSTERECTOMY (CERVIX STATUS UNKNOWN)      MELVINA AND BSO (CERVIX REMOVED)       Current Outpatient Medications   Medication Sig Dispense Refill    loperamide (IMODIUM) 2 MG capsule TAKE 1 CAPSULE BY MOUTH ONCE DAILY AS NEEDED FOR DIARRHEA 90 capsule 0

## 2024-08-12 RX ORDER — ROSUVASTATIN CALCIUM 10 MG/1
10 TABLET, COATED ORAL DAILY
Qty: 90 TABLET | Refills: 1 | Status: SHIPPED | OUTPATIENT
Start: 2024-08-12

## 2024-08-12 NOTE — TELEPHONE ENCOUNTER
Asia Juárez called to request a refill on her medication.      Last office visit : 8/5/2024   Next office visit : 2/5/2025     Requested Prescriptions     Pending Prescriptions Disp Refills    rosuvastatin (CRESTOR) 10 MG tablet 90 tablet 1     Sig: Take 1 tablet by mouth daily            Jessica Curry MA

## 2024-08-23 RX ORDER — LEVOTHYROXINE SODIUM 0.07 MG/1
TABLET ORAL
Qty: 116 TABLET | Refills: 1 | Status: SHIPPED | OUTPATIENT
Start: 2024-08-23

## 2024-08-23 NOTE — TELEPHONE ENCOUNTER
Sharon called requesting a refill of the below medication which has been pended for you:     Requested Prescriptions     Pending Prescriptions Disp Refills    levothyroxine (SYNTHROID) 75 MCG tablet 116 tablet 1     Sig: TAKE 1 TABLET BY MOUTH ONCE DAILY EXCEPT  SUNDAYS  TAKE  TWO  TABLETS       Last Appointment Date: 8/5/2024  Next Appointment Date: 2/5/2025

## 2024-10-16 RX ORDER — LOPERAMIDE HCL 2 MG
CAPSULE ORAL
Qty: 90 CAPSULE | Refills: 0 | Status: SHIPPED | OUTPATIENT
Start: 2024-10-16

## 2024-10-16 NOTE — TELEPHONE ENCOUNTER
Asia Juárez called to request a refill on her medication.      Last office visit : 8/5/2024   Next office visit : 2/5/2025     Requested Prescriptions     Pending Prescriptions Disp Refills    loperamide (IMODIUM) 2 MG capsule [Pharmacy Med Name: Loperamide HCl 2 MG Oral Capsule] 90 capsule 0     Sig: TAKE 1 CAPSULE BY MOUTH ONCE DAILY AS NEEDED FOR DIARRHEA            Jessica Curry MA

## 2024-11-19 ENCOUNTER — TELEPHONE (OUTPATIENT)
Dept: INTERNAL MEDICINE | Age: 79
End: 2024-11-19

## 2024-11-19 NOTE — TELEPHONE ENCOUNTER
Sw pt she states that she has a lump on right breast the one that she used to have cancer in previously. Pt is very confused and would like to know what you advise her to do or who should she see please advise . She states the lump is on scar on her right breast

## 2024-11-20 ENCOUNTER — OFFICE VISIT (OUTPATIENT)
Dept: INTERNAL MEDICINE | Age: 79
End: 2024-11-20
Payer: MEDICARE

## 2024-11-20 VITALS
BODY MASS INDEX: 33.55 KG/M2 | WEIGHT: 201.4 LBS | HEART RATE: 60 BPM | SYSTOLIC BLOOD PRESSURE: 134 MMHG | HEIGHT: 65 IN | DIASTOLIC BLOOD PRESSURE: 80 MMHG | OXYGEN SATURATION: 96 %

## 2024-11-20 DIAGNOSIS — Z85.3 HISTORY OF RIGHT BREAST CANCER: ICD-10-CM

## 2024-11-20 DIAGNOSIS — N63.11 MASS OF UPPER OUTER QUADRANT OF RIGHT BREAST: Primary | ICD-10-CM

## 2024-11-20 DIAGNOSIS — Z98.890 STATUS POST RIGHT BREAST LUMPECTOMY: ICD-10-CM

## 2024-11-20 PROCEDURE — 99213 OFFICE O/P EST LOW 20 MIN: CPT | Performed by: INTERNAL MEDICINE

## 2024-11-20 PROCEDURE — 1123F ACP DISCUSS/DSCN MKR DOCD: CPT | Performed by: INTERNAL MEDICINE

## 2024-11-20 PROCEDURE — 1159F MED LIST DOCD IN RCRD: CPT | Performed by: INTERNAL MEDICINE

## 2024-11-20 ASSESSMENT — PATIENT HEALTH QUESTIONNAIRE - PHQ9
SUM OF ALL RESPONSES TO PHQ QUESTIONS 1-9: 0
2. FEELING DOWN, DEPRESSED OR HOPELESS: NOT AT ALL
SUM OF ALL RESPONSES TO PHQ9 QUESTIONS 1 & 2: 0
SUM OF ALL RESPONSES TO PHQ QUESTIONS 1-9: 0
1. LITTLE INTEREST OR PLEASURE IN DOING THINGS: NOT AT ALL
SUM OF ALL RESPONSES TO PHQ QUESTIONS 1-9: 0
SUM OF ALL RESPONSES TO PHQ QUESTIONS 1-9: 0

## 2024-11-20 ASSESSMENT — ENCOUNTER SYMPTOMS
CONSTIPATION: 0
WHEEZING: 0
CHEST TIGHTNESS: 0
COUGH: 0
SORE THROAT: 0
ABDOMINAL PAIN: 0

## 2024-11-20 NOTE — PROGRESS NOTES
Negative     Cholesterol, Total 08/05/2024 159 (L)     Triglycerides 08/05/2024 117     HDL 08/05/2024 66     LDL Cholesterol 08/05/2024 70     Sodium 08/05/2024 143     Potassium 08/05/2024 4.2     Chloride 08/05/2024 108     CO2 08/05/2024 23     Anion Gap 08/05/2024 12     Glucose 08/05/2024 94     BUN 08/05/2024 14     Creatinine 08/05/2024 0.6     Est, Glom Filt Rate 08/05/2024 >90     Calcium 08/05/2024 8.9     Total Protein 08/05/2024 6.8     Albumin 08/05/2024 4.1     Total Bilirubin 08/05/2024 0.7     Alkaline Phosphatase 08/05/2024 77     ALT 08/05/2024 22     AST 08/05/2024 26     Hemoglobin A1C 08/05/2024 5.3     WBC, UA 08/05/2024 0-1     RBC, UA 08/05/2024 0-1     Epithelial Cells, UA 08/05/2024 5-10     Crystals, UA 08/05/2024 3+ Ca. Oxalate            ASSESSMENT/PLAN:        Mass of upper outer quadrant of right breast  History of right breast cancer  Status post right breast lumpectomy  HX  Post lumpectomy RT  breast 2014  ++ history papillary cardcinoma 0.14 cn- partial mastectomy 1/17/2014  ER/FL positive, [id number] negative by FISH     Obtain  -     ANTHONY DIGITAL DIAGNOSTIC UNILATERAL RIGHT; Future  -     US BREAST COMPLETE RIGHT; Future        Orders Placed This Encounter   Procedures    ANTHONY DIGITAL DIAGNOSTIC UNILATERAL RIGHT    US BREAST COMPLETE RIGHT     New Prescriptions    No medications on file         No follow-ups on file.   There are no Patient Instructions on file for this visit.  EMR Dragon/transcription disclaimer:Significant part of this  encounter note is electronic transcription/translationof spoken language to printed text. The electronic translation of spoken language may be erroneous, or at times, nonsensical words or phrases may be inadvertently transcribed. Although I have reviewed the note for sucherrors, some may still exist.

## 2024-11-27 ENCOUNTER — HOSPITAL ENCOUNTER (OUTPATIENT)
Dept: WOMENS IMAGING | Age: 79
Discharge: HOME | End: 2024-11-27
Payer: MEDICARE

## 2024-11-27 DIAGNOSIS — Z85.3 HISTORY OF RIGHT BREAST CANCER: ICD-10-CM

## 2024-11-27 DIAGNOSIS — Z98.890 STATUS POST RIGHT BREAST LUMPECTOMY: ICD-10-CM

## 2024-11-27 DIAGNOSIS — N63.11 MASS OF UPPER OUTER QUADRANT OF RIGHT BREAST: ICD-10-CM

## 2024-11-27 PROCEDURE — 76642 ULTRASOUND BREAST LIMITED: CPT

## 2024-11-27 PROCEDURE — G0279 TOMOSYNTHESIS, MAMMO: HCPCS

## 2024-12-02 ENCOUNTER — OFFICE VISIT (OUTPATIENT)
Dept: CARDIOLOGY CLINIC | Age: 79
End: 2024-12-02

## 2024-12-02 VITALS
WEIGHT: 195 LBS | HEART RATE: 64 BPM | OXYGEN SATURATION: 97 % | BODY MASS INDEX: 32.45 KG/M2 | SYSTOLIC BLOOD PRESSURE: 116 MMHG | DIASTOLIC BLOOD PRESSURE: 78 MMHG

## 2024-12-02 DIAGNOSIS — I48.0 PAF (PAROXYSMAL ATRIAL FIBRILLATION) (HCC): Primary | ICD-10-CM

## 2024-12-02 RX ORDER — METOPROLOL SUCCINATE 25 MG/1
TABLET, EXTENDED RELEASE ORAL
Qty: 45 TABLET | Refills: 0 | Status: SHIPPED | OUTPATIENT
Start: 2024-12-02 | End: 2024-12-02 | Stop reason: SDUPTHER

## 2024-12-02 RX ORDER — METOPROLOL SUCCINATE 25 MG/1
12.5 TABLET, EXTENDED RELEASE ORAL DAILY
Qty: 45 TABLET | Refills: 3 | Status: SHIPPED | OUTPATIENT
Start: 2024-12-02 | End: 2025-03-02

## 2024-12-02 ASSESSMENT — ENCOUNTER SYMPTOMS
EYE DISCHARGE: 0
BLOOD IN STOOL: 0
CHEST TIGHTNESS: 0
ABDOMINAL PAIN: 0
EYE PAIN: 0
EYE REDNESS: 0
SORE THROAT: 0
FACIAL SWELLING: 0
SHORTNESS OF BREATH: 0
WHEEZING: 0
VOMITING: 0
APNEA: 0
DIARRHEA: 0
CONSTIPATION: 0
NAUSEA: 0
COUGH: 0
ABDOMINAL DISTENTION: 0

## 2024-12-02 NOTE — PROGRESS NOTES
06/03/2021 <0.01 0.00 - 0.03 ng/mL Final     Comment:     <0.030 ng/ml       No measurable cardiac damage.    0.030-0.099 ng/ml  Values of troponin in this range suggest  possible myocardial damage. Repeat assay  4 to 6 hours after the current specimen.    >= 0.100 ng/ml     Indicative of myocardial damage.  Recommend continued monitoring of  patient status and cardiac markers.        LIPID PANEL: No results found for: \"LIPIDPAN\"  LIVER PROFILE:   AST   Date Value Ref Range Status   08/05/2024 26 5 - 32 U/L Final     ALT   Date Value Ref Range Status   08/05/2024 22 5 - 33 U/L Final              Imaging:    - EKG : Sinus rhythm 64 bpm.  Poor R wave progression across burden.  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  Assumed related to occult recurrence of atrial fibrillation.  Stable on Eliquis and baby aspirin  Continue rosuvastatin     Cardiomyopathy--mild to moderate left atrial enlargement.    Nuclear stress test 6/2023: EF 75%. No obvious infarct or ischemia.   Ejection fraction 70%     Chronic valvular heart disease--moderate tricuspid regurgitation  Continue conservative medical management              Electronically signed by Jeramy Acevedo MD on 12/2/2024 at 10:36 AM    Jeramy Acevedo MD, LIVIA, [facility]  Noninvasive Cardiology Consultant    This dictation was generated by voice recognition computer software.  Although all attempts are made to edit the dictation for accuracy, there may be errors in the transcription that are not intended.

## 2024-12-03 RX ORDER — FLECAINIDE ACETATE 100 MG/1
TABLET ORAL
Qty: 90 TABLET | Refills: 0 | Status: SHIPPED | OUTPATIENT
Start: 2024-12-03

## 2024-12-03 NOTE — TELEPHONE ENCOUNTER
Asia Juárez called to request a refill on her medication.      Last office visit : 11/20/2024   Next office visit : 2/5/2025     Requested Prescriptions     Pending Prescriptions Disp Refills    flecainide (TAMBOCOR) 100 MG tablet [Pharmacy Med Name: Flecainide Acetate 100 MG Oral Tablet] 90 tablet 0     Sig: TAKE 1 TABLET BY MOUTH IN THE MORNING, 1 AT NOON AND 1 AT BEDTIME            Jessica Curry MA

## 2025-01-08 RX ORDER — LOPERAMIDE HYDROCHLORIDE 2 MG/1
CAPSULE ORAL
Qty: 90 CAPSULE | Refills: 0 | Status: SHIPPED | OUTPATIENT
Start: 2025-01-08

## 2025-01-08 NOTE — TELEPHONE ENCOUNTER
Asia Juárez called to request a refill on her medication.      Last office visit : 11/20/2024   Next office visit : 2/5/2025     Requested Prescriptions     Pending Prescriptions Disp Refills    loperamide (IMODIUM) 2 MG capsule [Pharmacy Med Name: Loperamide HCl 2 MG Oral Capsule] 90 capsule 0     Sig: TAKE 1 CAPSULE BY MOUTH ONCE DAILY AS NEEDED FOR DIARRHEA            Jessica Curry MA   DISCHARGE

## 2025-01-13 RX ORDER — FLECAINIDE ACETATE 100 MG/1
TABLET ORAL
Qty: 90 TABLET | Refills: 0 | Status: SHIPPED | OUTPATIENT
Start: 2025-01-13

## 2025-02-05 ENCOUNTER — OFFICE VISIT (OUTPATIENT)
Dept: INTERNAL MEDICINE | Age: 80
End: 2025-02-05

## 2025-02-05 VITALS
OXYGEN SATURATION: 98 % | WEIGHT: 201 LBS | SYSTOLIC BLOOD PRESSURE: 118 MMHG | HEIGHT: 65 IN | DIASTOLIC BLOOD PRESSURE: 76 MMHG | BODY MASS INDEX: 33.49 KG/M2 | HEART RATE: 89 BPM

## 2025-02-05 DIAGNOSIS — I48.0 PAF (PAROXYSMAL ATRIAL FIBRILLATION) (HCC): ICD-10-CM

## 2025-02-05 DIAGNOSIS — Z00.00 MEDICARE ANNUAL WELLNESS VISIT, SUBSEQUENT: Primary | ICD-10-CM

## 2025-02-05 DIAGNOSIS — Z86.73 STATUS POST CVA: ICD-10-CM

## 2025-02-05 DIAGNOSIS — E03.9 ACQUIRED HYPOTHYROIDISM: ICD-10-CM

## 2025-02-05 DIAGNOSIS — Z85.3 HISTORY OF RIGHT BREAST CANCER: ICD-10-CM

## 2025-02-05 DIAGNOSIS — E53.8 B12 DEFICIENCY: ICD-10-CM

## 2025-02-05 DIAGNOSIS — E78.00 PURE HYPERCHOLESTEROLEMIA: ICD-10-CM

## 2025-02-05 DIAGNOSIS — I67.9 CEREBROVASCULAR DISEASE: ICD-10-CM

## 2025-02-05 DIAGNOSIS — I34.0 MODERATE MITRAL REGURGITATION: ICD-10-CM

## 2025-02-05 DIAGNOSIS — E55.9 VITAMIN D DEFICIENCY: ICD-10-CM

## 2025-02-05 DIAGNOSIS — R73.01 IFG (IMPAIRED FASTING GLUCOSE): ICD-10-CM

## 2025-02-05 DIAGNOSIS — Z98.890 STATUS POST RIGHT BREAST LUMPECTOMY: ICD-10-CM

## 2025-02-05 DIAGNOSIS — D68.69 SECONDARY HYPERCOAGULABLE STATE (HCC): ICD-10-CM

## 2025-02-05 DIAGNOSIS — R31.29 MICROHEMATURIA: ICD-10-CM

## 2025-02-05 DIAGNOSIS — R31.9 HEMATURIA, UNSPECIFIED TYPE: Primary | ICD-10-CM

## 2025-02-05 LAB
25(OH)D3 SERPL-MCNC: 59 NG/ML
ALBUMIN SERPL-MCNC: 4.4 G/DL (ref 3.5–5.2)
ALP SERPL-CCNC: 81 U/L (ref 35–104)
ALT SERPL-CCNC: 14 U/L (ref 5–33)
ANION GAP SERPL CALCULATED.3IONS-SCNC: 12 MMOL/L (ref 8–16)
AST SERPL-CCNC: 20 U/L (ref 5–32)
BACTERIA URNS QL MICRO: NEGATIVE /HPF
BASOPHILS # BLD: 0 K/UL (ref 0–0.2)
BASOPHILS NFR BLD: 0.7 % (ref 0–1)
BILIRUB SERPL-MCNC: 1.1 MG/DL (ref 0.2–1.2)
BILIRUB UR QL STRIP: NEGATIVE
BUN SERPL-MCNC: 12 MG/DL (ref 8–23)
CALCIUM SERPL-MCNC: 9.6 MG/DL (ref 8.8–10.2)
CHLORIDE SERPL-SCNC: 106 MMOL/L (ref 98–107)
CHOLEST SERPL-MCNC: 283 MG/DL (ref 0–199)
CLARITY UR: CLEAR
CO2 SERPL-SCNC: 26 MMOL/L (ref 22–29)
COLOR UR: ABNORMAL
CREAT SERPL-MCNC: 0.6 MG/DL (ref 0.5–0.9)
CRYSTALS URNS MICRO: ABNORMAL /HPF
EOSINOPHIL # BLD: 0.1 K/UL (ref 0–0.6)
EOSINOPHIL NFR BLD: 2.4 % (ref 0–5)
EPI CELLS #/AREA URNS AUTO: 7 /HPF (ref 0–5)
ERYTHROCYTE [DISTWIDTH] IN BLOOD BY AUTOMATED COUNT: 12.6 % (ref 11.5–14.5)
GLUCOSE SERPL-MCNC: 93 MG/DL (ref 70–99)
GLUCOSE UR STRIP.AUTO-MCNC: NEGATIVE MG/DL
HBA1C MFR BLD: 5.3 % (ref 4–5.6)
HCT VFR BLD AUTO: 47.3 % (ref 37–47)
HDLC SERPL-MCNC: 70 MG/DL (ref 40–60)
HGB BLD-MCNC: 15.6 G/DL (ref 12–16)
HGB UR STRIP.AUTO-MCNC: ABNORMAL MG/L
HYALINE CASTS #/AREA URNS AUTO: 5 /HPF (ref 0–8)
IMM GRANULOCYTES # BLD: 0 K/UL
KETONES UR STRIP.AUTO-MCNC: NEGATIVE MG/DL
LDLC SERPL CALC-MCNC: 176 MG/DL
LEUKOCYTE ESTERASE UR QL STRIP.AUTO: ABNORMAL
LYMPHOCYTES # BLD: 1.5 K/UL (ref 1.1–4.5)
LYMPHOCYTES NFR BLD: 27.6 % (ref 20–40)
MCH RBC QN AUTO: 32.2 PG (ref 27–31)
MCHC RBC AUTO-ENTMCNC: 33 G/DL (ref 33–37)
MCV RBC AUTO: 97.7 FL (ref 81–99)
MONOCYTES # BLD: 0.4 K/UL (ref 0–0.9)
MONOCYTES NFR BLD: 6.7 % (ref 0–10)
NEUTROPHILS # BLD: 3.4 K/UL (ref 1.5–7.5)
NEUTS SEG NFR BLD: 62.4 % (ref 50–65)
NITRITE UR QL STRIP.AUTO: NEGATIVE
PH UR STRIP.AUTO: 6 [PH] (ref 5–8)
PLATELET # BLD AUTO: 287 K/UL (ref 130–400)
PMV BLD AUTO: 11.4 FL (ref 9.4–12.3)
POTASSIUM SERPL-SCNC: 4.3 MMOL/L (ref 3.5–5.1)
PROT SERPL-MCNC: 7.3 G/DL (ref 6.4–8.3)
PROT UR STRIP.AUTO-MCNC: NEGATIVE MG/DL
RBC # BLD AUTO: 4.84 M/UL (ref 4.2–5.4)
RBC #/AREA URNS AUTO: 14 /HPF (ref 0–4)
SODIUM SERPL-SCNC: 144 MMOL/L (ref 136–145)
SP GR UR STRIP.AUTO: 1.02 (ref 1–1.03)
T4 FREE SERPL-MCNC: 1.76 NG/DL (ref 0.93–1.7)
TRIGL SERPL-MCNC: 186 MG/DL (ref 0–149)
TSH SERPL DL<=0.005 MIU/L-ACNC: 2.15 UIU/ML (ref 0.27–4.2)
UROBILINOGEN UR STRIP.AUTO-MCNC: 1 E.U./DL
WBC # BLD AUTO: 5.4 K/UL (ref 4.8–10.8)
WBC #/AREA URNS AUTO: 2 /HPF (ref 0–5)

## 2025-02-05 RX ORDER — FLECAINIDE ACETATE 100 MG/1
100 TABLET ORAL 3 TIMES DAILY
Qty: 270 TABLET | Refills: 1 | Status: SHIPPED | OUTPATIENT
Start: 2025-02-05

## 2025-02-05 RX ORDER — LEVOTHYROXINE SODIUM 75 UG/1
TABLET ORAL
Qty: 116 TABLET | Refills: 1 | Status: SHIPPED | OUTPATIENT
Start: 2025-02-05

## 2025-02-05 SDOH — ECONOMIC STABILITY: FOOD INSECURITY: WITHIN THE PAST 12 MONTHS, YOU WORRIED THAT YOUR FOOD WOULD RUN OUT BEFORE YOU GOT MONEY TO BUY MORE.: NEVER TRUE

## 2025-02-05 SDOH — ECONOMIC STABILITY: FOOD INSECURITY: WITHIN THE PAST 12 MONTHS, THE FOOD YOU BOUGHT JUST DIDN'T LAST AND YOU DIDN'T HAVE MONEY TO GET MORE.: NEVER TRUE

## 2025-02-05 ASSESSMENT — ENCOUNTER SYMPTOMS
SORE THROAT: 0
ABDOMINAL PAIN: 0
CHEST TIGHTNESS: 0
COUGH: 0
WHEEZING: 0
CONSTIPATION: 0

## 2025-02-05 ASSESSMENT — LIFESTYLE VARIABLES
HOW OFTEN DO YOU HAVE A DRINK CONTAINING ALCOHOL: NEVER
HOW MANY STANDARD DRINKS CONTAINING ALCOHOL DO YOU HAVE ON A TYPICAL DAY: PATIENT DOES NOT DRINK

## 2025-02-05 ASSESSMENT — PATIENT HEALTH QUESTIONNAIRE - PHQ9
SUM OF ALL RESPONSES TO PHQ9 QUESTIONS 1 & 2: 2
SUM OF ALL RESPONSES TO PHQ QUESTIONS 1-9: 2
SUM OF ALL RESPONSES TO PHQ QUESTIONS 1-9: 2
1. LITTLE INTEREST OR PLEASURE IN DOING THINGS: SEVERAL DAYS
2. FEELING DOWN, DEPRESSED OR HOPELESS: SEVERAL DAYS
SUM OF ALL RESPONSES TO PHQ QUESTIONS 1-9: 2
SUM OF ALL RESPONSES TO PHQ QUESTIONS 1-9: 2

## 2025-02-05 NOTE — TELEPHONE ENCOUNTER
Notify patient regarding her lab results  1.  Her cholesterol is very high.  She has not been taking her cholesterol-lowering medication  Suggest she restarts immediately since she is very high risk for stroke and heart attack  2.  Her urinalysis shows blood.  Urinalysis needs to be repeated in about 1 month, I have placed orders

## 2025-02-05 NOTE — PROGRESS NOTES
Chief Complaint   Patient presents with    Multiple medical problems     Pt has been under a lot of stress since  has been sick [place] and she has been down .     History of presenting illness:  Asia Juárez is a79 y.o. female who presents today for follow up on her chronic medical conditions as noted below.      Patient Active Problem List    Diagnosis Date Noted    Secondary hypercoagulable state ([facility]) 08/05/2024    Folic acid deficiency 12/04/2023    Hyperkalemia 11/27/2023    Atrial fibrillation with rapid ventricular response ([facility]) 11/27/2023    Acute CVA (cerebrovascular accident) ([facility]) 06/03/2021    GERD (gastroesophageal reflux disease)     Moderate mitral regurgitation 11/16/2017     Overview Note:     2014 mil/ mod      Pure hypercholesterolemia 11/16/2017    IFG (impaired fasting glucose) 11/16/2017    Acquired hypothyroidism 11/16/2017    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    B12 deficiency 11/16/2017    Vitamin D deficiency 11/16/2017    PAF (paroxysmal atrial fibrillation) ([facility]) 09/07/2014     Overview Note:     9/6/2014  DCCV in ED  09/07/2014  Echo  Normal LVFX  09/07/2014  lexiscan negative for myocardial ischemia, EF 62 %            Personal history of breast cancer 08/20/2014    S/P lumpectomy, right breast 1/7/14 02/12/2014     Past Medical History:   Diagnosis Date    Breast cancer ([facility]) 1/2014    right    GERD (gastroesophageal reflux disease)     History of therapeutic radiation     Hyperlipidemia     Thyroid disease     Wears glasses       Past Surgical History:   Procedure Laterality Date    BREAST SURGERY Bilateral 1985    FIBROIDS REMOVED FROM EACH BREAST    BREAST SURGERY Right 01/07/2014    R partial mastectomy    BREAST SURGERY Right 01/22/2014    Rt JUAN R cath insertion    CARPAL TUNNEL RELEASE Left     EYE SURGERY      cataracts    HYSTERECTOMY (CERVIX STATUS UNKNOWN)      MELVINA AND BSO (CERVIX REMOVED)       Current Outpatient Medications   Medication Sig Dispense 
includes daily menus and recipes may be best.  Ask your doctor about other health professionals who can help you achieve your weight-loss goals.  A dietitian can help you make healthy changes in your diet.  An exercise specialist or  can help you develop a safe and effective exercise program.  A counselor or psychiatrist can help you cope with issues such as depression, anxiety, or family problems that can make it hard to focus on weight loss.  Consider joining a support group for people who are trying to lose weight. Your doctor can suggest groups in your area.  Where can you learn more?  Go to https://www.[employer].net/patientEd and enter U357 to learn more about \"Starting a Weight-Loss Plan: Care Instructions.\"  Current as of: April 30, 2024  Content Version: 14.3  © 2024 [employer].   Care instructions adapted under license by [employer]. If you have questions about a medical condition or this instruction, always ask your healthcare professional. [employer], disclaims any warranty or liability for your use of this information.         Advance Directives: Care Instructions  Overview  An advance directive is a legal way to state your wishes at the end of your life. It tells your family and your doctor what to do if you can't say what you want.  There are two main types of advance directives. You can change them any time your wishes change.  Living will.  This form tells your family and your doctor your wishes about life support and other treatment. The form is also called a declaration.  Medical power of .  This form lets you name a person to make treatment decisions for you when you can't speak for yourself. This person is called a health care agent (health care proxy, health care surrogate). The form is also called a durable power of  for health care.  If you do not have an advance directive, decisions about your medical care may be made by a family

## 2025-02-06 RX ORDER — ROSUVASTATIN CALCIUM 10 MG/1
10 TABLET, COATED ORAL DAILY
Qty: 90 TABLET | Refills: 1 | Status: SHIPPED | OUTPATIENT
Start: 2025-02-06

## 2025-02-12 DIAGNOSIS — R31.9 HEMATURIA, UNSPECIFIED TYPE: ICD-10-CM

## 2025-02-12 LAB
BACTERIA URNS QL MICRO: NEGATIVE /HPF
BILIRUB UR QL STRIP: NEGATIVE
CLARITY UR: ABNORMAL
COLOR UR: ABNORMAL
CRYSTALS URNS MICRO: ABNORMAL /HPF
EPI CELLS #/AREA URNS AUTO: 3 /HPF (ref 0–5)
GLUCOSE UR STRIP.AUTO-MCNC: NEGATIVE MG/DL
HGB UR STRIP.AUTO-MCNC: NEGATIVE MG/L
HYALINE CASTS #/AREA URNS AUTO: 5 /HPF (ref 0–8)
KETONES UR STRIP.AUTO-MCNC: NEGATIVE MG/DL
LEUKOCYTE ESTERASE UR QL STRIP.AUTO: NEGATIVE
NITRITE UR QL STRIP.AUTO: NEGATIVE
PH UR STRIP.AUTO: 5.5 [PH] (ref 5–8)
PROT UR STRIP.AUTO-MCNC: NEGATIVE MG/DL
RBC #/AREA URNS AUTO: 1 /HPF (ref 0–4)
SP GR UR STRIP.AUTO: 1.01 (ref 1–1.03)
UROBILINOGEN UR STRIP.AUTO-MCNC: 1 E.U./DL
WBC #/AREA URNS AUTO: 1 /HPF (ref 0–5)

## 2025-04-10 RX ORDER — LOPERAMIDE HYDROCHLORIDE 2 MG/1
CAPSULE ORAL
Qty: 90 CAPSULE | Refills: 0 | Status: SHIPPED | OUTPATIENT
Start: 2025-04-10

## 2025-05-20 RX ORDER — APIXABAN 5 MG/1
5 TABLET, FILM COATED ORAL 2 TIMES DAILY
Qty: 180 TABLET | Refills: 1 | Status: SHIPPED | OUTPATIENT
Start: 2025-05-20

## 2025-06-03 ENCOUNTER — OFFICE VISIT (OUTPATIENT)
Dept: CARDIOLOGY CLINIC | Age: 80
End: 2025-06-03
Payer: MEDICARE

## 2025-06-03 VITALS
DIASTOLIC BLOOD PRESSURE: 60 MMHG | RESPIRATION RATE: 18 BRPM | BODY MASS INDEX: 31.82 KG/M2 | WEIGHT: 191 LBS | HEART RATE: 80 BPM | OXYGEN SATURATION: 97 % | HEIGHT: 65 IN | SYSTOLIC BLOOD PRESSURE: 134 MMHG

## 2025-06-03 DIAGNOSIS — I38 VALVULAR HEART DISEASE: Primary | ICD-10-CM

## 2025-06-03 PROCEDURE — 99214 OFFICE O/P EST MOD 30 MIN: CPT | Performed by: INTERNAL MEDICINE

## 2025-06-03 PROCEDURE — 93000 ELECTROCARDIOGRAM COMPLETE: CPT | Performed by: INTERNAL MEDICINE

## 2025-06-03 PROCEDURE — 1123F ACP DISCUSS/DSCN MKR DOCD: CPT | Performed by: INTERNAL MEDICINE

## 2025-06-03 PROCEDURE — 1159F MED LIST DOCD IN RCRD: CPT | Performed by: INTERNAL MEDICINE

## 2025-06-03 PROCEDURE — 1160F RVW MEDS BY RX/DR IN RCRD: CPT | Performed by: INTERNAL MEDICINE

## 2025-06-03 NOTE — PROGRESS NOTES
Cardiology Office Visit Note  [address], [place] [address]  Phone: [phone]  Fax: [phone]                            Date:  6/3/2025  Patient: Asia Juárez  Age:  79 y.o., 1945    Referral: Vanita Hernandez MD    REASON FOR VISIT:  Follow-up (No complaints at this time)         PROBLEM LIST:  Patient Active Problem List    Diagnosis Date Noted    Acute CVA (cerebrovascular accident) (HCC) 06/03/2021     Priority: Low    GERD (gastroesophageal reflux disease)      Priority: Low    Moderate mitral regurgitation 11/16/2017     Priority: Low     Overview Note:     2014 mil/ mod      Pure hypercholesterolemia 11/16/2017     Priority: Low    IFG (impaired fasting glucose) 11/16/2017     Priority: Low    Acquired hypothyroidism 11/16/2017     Priority: Low    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     Priority: Low    B12 deficiency 11/16/2017     Priority: Low    Vitamin D deficiency 11/16/2017     Priority: Low    PAF (paroxysmal atrial fibrillation) (HCC) 09/07/2014     Priority: Low     Overview Note:     9/6/2014  DCCV in ED  09/07/2014  Echo  Normal LVFX  09/07/2014  lexiscan negative for myocardial ischemia, EF 62 %            Personal history of breast cancer 08/20/2014     Priority: Low    S/P lumpectomy, right breast 1/7/14 02/12/2014     Priority: Low    Secondary hypercoagulable state 08/05/2024    Folic acid deficiency 12/04/2023    Hyperkalemia 11/27/2023    Atrial fibrillation with rapid ventricular response (HCC) 11/27/2023       ASSESSMENT PLAN:  Assessment & Plan  Paroxysmal atrial fibrillation:  - EKG shows sinus rhythm, 63 bpm, QTc 418 ms, no acute changes  - Continue flecainide, metoprolol, and Eliquis  - Conduct EKG every 6 months while on flecainide  - Consult cardiologist for biannual EKGs     Embolic cerebrovascular accident (CVA):  - Continue Eliquis and baby aspirin    Cardiomyopathy:  - Mild to moderate left atrial enlargement  - Nuclear stress test in 06/2023

## 2025-07-08 RX ORDER — LOPERAMIDE HYDROCHLORIDE 2 MG/1
CAPSULE ORAL
Qty: 90 CAPSULE | Refills: 0 | Status: SHIPPED | OUTPATIENT
Start: 2025-07-08

## 2025-07-08 NOTE — TELEPHONE ENCOUNTER
Asia Juárez called to request a refill on her medication.      Last office visit : 2/5/2025   Next office visit : 8/5/2025     Requested Prescriptions     Pending Prescriptions Disp Refills    loperamide (IMODIUM) 2 MG capsule [Pharmacy Med Name: Loperamide HCl 2 MG Oral Capsule] 90 capsule 0     Sig: TAKE 1 CAPSULE BY MOUTH ONCE DAILY AS NEEDED FOR DIARRHEA            Jessica Curry MA

## 2025-08-05 ENCOUNTER — OFFICE VISIT (OUTPATIENT)
Dept: INTERNAL MEDICINE | Age: 80
End: 2025-08-05
Payer: MEDICARE

## 2025-08-05 VITALS
HEIGHT: 65 IN | DIASTOLIC BLOOD PRESSURE: 68 MMHG | WEIGHT: 184.4 LBS | OXYGEN SATURATION: 96 % | SYSTOLIC BLOOD PRESSURE: 118 MMHG | HEART RATE: 78 BPM | BODY MASS INDEX: 30.72 KG/M2

## 2025-08-05 DIAGNOSIS — Z00.00 MEDICARE ANNUAL WELLNESS VISIT, SUBSEQUENT: ICD-10-CM

## 2025-08-05 DIAGNOSIS — I67.9 CEREBROVASCULAR DISEASE: ICD-10-CM

## 2025-08-05 DIAGNOSIS — E53.8 B12 DEFICIENCY: ICD-10-CM

## 2025-08-05 DIAGNOSIS — E55.9 VITAMIN D DEFICIENCY: ICD-10-CM

## 2025-08-05 DIAGNOSIS — I34.0 MODERATE MITRAL REGURGITATION: ICD-10-CM

## 2025-08-05 DIAGNOSIS — R73.01 IFG (IMPAIRED FASTING GLUCOSE): ICD-10-CM

## 2025-08-05 DIAGNOSIS — R31.29 MICROHEMATURIA: ICD-10-CM

## 2025-08-05 DIAGNOSIS — Z98.890 STATUS POST RIGHT BREAST LUMPECTOMY: ICD-10-CM

## 2025-08-05 DIAGNOSIS — D68.69 SECONDARY HYPERCOAGULABLE STATE: ICD-10-CM

## 2025-08-05 DIAGNOSIS — E78.00 PURE HYPERCHOLESTEROLEMIA: ICD-10-CM

## 2025-08-05 DIAGNOSIS — Z86.73 STATUS POST CVA: ICD-10-CM

## 2025-08-05 DIAGNOSIS — I48.0 PAF (PAROXYSMAL ATRIAL FIBRILLATION) (HCC): Primary | ICD-10-CM

## 2025-08-05 DIAGNOSIS — E03.9 ACQUIRED HYPOTHYROIDISM: ICD-10-CM

## 2025-08-05 DIAGNOSIS — I48.0 PAF (PAROXYSMAL ATRIAL FIBRILLATION) (HCC): ICD-10-CM

## 2025-08-05 DIAGNOSIS — Z85.3 HISTORY OF RIGHT BREAST CANCER: ICD-10-CM

## 2025-08-05 LAB
ALBUMIN SERPL-MCNC: 4.2 G/DL (ref 3.5–5.2)
ALP SERPL-CCNC: 81 U/L (ref 35–104)
ALT SERPL-CCNC: 19 U/L (ref 10–35)
ANION GAP SERPL CALCULATED.3IONS-SCNC: 11 MMOL/L (ref 8–16)
AST SERPL-CCNC: 25 U/L (ref 10–35)
BACTERIA URNS QL MICRO: NEGATIVE /HPF
BILIRUB SERPL-MCNC: 0.9 MG/DL (ref 0.2–1.2)
BILIRUB UR QL STRIP: NEGATIVE
BUN SERPL-MCNC: 8 MG/DL (ref 8–23)
CALCIUM SERPL-MCNC: 9.7 MG/DL (ref 8.8–10.2)
CHLORIDE SERPL-SCNC: 106 MMOL/L (ref 98–107)
CHOLEST SERPL-MCNC: 165 MG/DL (ref 0–199)
CLARITY UR: CLEAR
CO2 SERPL-SCNC: 25 MMOL/L (ref 22–29)
COLOR UR: YELLOW
CREAT SERPL-MCNC: 0.7 MG/DL (ref 0.5–0.9)
CRYSTALS URNS MICRO: ABNORMAL /HPF
EPI CELLS #/AREA URNS AUTO: 8 /HPF (ref 0–5)
GLUCOSE SERPL-MCNC: 95 MG/DL (ref 70–99)
GLUCOSE UR STRIP.AUTO-MCNC: NEGATIVE MG/DL
HBA1C MFR BLD: 5.4 % (ref 4–5.6)
HDLC SERPL-MCNC: 69 MG/DL (ref 40–60)
HGB UR STRIP.AUTO-MCNC: NEGATIVE MG/L
HYALINE CASTS #/AREA URNS AUTO: 1 /HPF (ref 0–8)
KETONES UR STRIP.AUTO-MCNC: NEGATIVE MG/DL
LDLC SERPL CALC-MCNC: 70 MG/DL
LEUKOCYTE ESTERASE UR QL STRIP.AUTO: ABNORMAL
NITRITE UR QL STRIP.AUTO: NEGATIVE
PH UR STRIP.AUTO: 7 [PH] (ref 5–8)
POTASSIUM SERPL-SCNC: 4.2 MMOL/L (ref 3.5–5.1)
PROT SERPL-MCNC: 7 G/DL (ref 6.4–8.3)
PROT UR STRIP.AUTO-MCNC: NEGATIVE MG/DL
RBC #/AREA URNS AUTO: 1 /HPF (ref 0–4)
SODIUM SERPL-SCNC: 142 MMOL/L (ref 136–145)
SP GR UR STRIP.AUTO: 1.02 (ref 1–1.03)
T4 FREE SERPL-MCNC: 1.73 NG/DL (ref 0.93–1.7)
TRIGL SERPL-MCNC: 131 MG/DL (ref 0–149)
TSH SERPL DL<=0.005 MIU/L-ACNC: 1.28 UIU/ML (ref 0.27–4.2)
UROBILINOGEN UR STRIP.AUTO-MCNC: 1 E.U./DL
WBC #/AREA URNS AUTO: 2 /HPF (ref 0–5)

## 2025-08-05 PROCEDURE — 1159F MED LIST DOCD IN RCRD: CPT | Performed by: INTERNAL MEDICINE

## 2025-08-05 PROCEDURE — 99214 OFFICE O/P EST MOD 30 MIN: CPT | Performed by: INTERNAL MEDICINE

## 2025-08-05 PROCEDURE — 1123F ACP DISCUSS/DSCN MKR DOCD: CPT | Performed by: INTERNAL MEDICINE

## 2025-08-05 RX ORDER — LEVOTHYROXINE SODIUM 75 UG/1
TABLET ORAL
Qty: 116 TABLET | Refills: 1 | Status: SHIPPED | OUTPATIENT
Start: 2025-08-05

## 2025-08-05 ASSESSMENT — ENCOUNTER SYMPTOMS
SORE THROAT: 0
COUGH: 0
ABDOMINAL PAIN: 0
WHEEZING: 0
CONSTIPATION: 0
CHEST TIGHTNESS: 0